# Patient Record
Sex: MALE | Race: WHITE | NOT HISPANIC OR LATINO | URBAN - METROPOLITAN AREA
[De-identification: names, ages, dates, MRNs, and addresses within clinical notes are randomized per-mention and may not be internally consistent; named-entity substitution may affect disease eponyms.]

---

## 2017-05-25 ENCOUNTER — APPOINTMENT (OUTPATIENT)
Age: 82
Setting detail: DERMATOLOGY
End: 2017-05-27

## 2017-05-25 DIAGNOSIS — Z86.006 PERSONAL HISTORY OF MELANOMA IN-SITU: ICD-10-CM

## 2017-05-25 DIAGNOSIS — L57.0 ACTINIC KERATOSIS: ICD-10-CM

## 2017-05-25 DIAGNOSIS — L92.0 GRANULOMA ANNULARE: ICD-10-CM

## 2017-05-25 DIAGNOSIS — Z85.828 PERSONAL HISTORY OF OTHER MALIGNANT NEOPLASM OF SKIN: ICD-10-CM

## 2017-05-25 DIAGNOSIS — Z71.89 OTHER SPECIFIED COUNSELING: ICD-10-CM

## 2017-05-25 PROBLEM — Z85.820 PERSONAL HISTORY OF MALIGNANT MELANOMA OF SKIN: Status: ACTIVE | Noted: 2017-05-25

## 2017-05-25 PROCEDURE — 17003 DESTRUCT PREMALG LES 2-14: CPT

## 2017-05-25 PROCEDURE — OTHER LIQUID NITROGEN: OTHER

## 2017-05-25 PROCEDURE — 99213 OFFICE O/P EST LOW 20 MIN: CPT | Mod: 25

## 2017-05-25 PROCEDURE — OTHER COUNSELING: OTHER

## 2017-05-25 PROCEDURE — 17000 DESTRUCT PREMALG LESION: CPT

## 2017-05-25 ASSESSMENT — LOCATION DETAILED DESCRIPTION DERM
LOCATION DETAILED: RIGHT PROXIMAL DORSAL FOREARM
LOCATION DETAILED: RIGHT MEDIAL UPPER BACK
LOCATION DETAILED: RIGHT CENTRAL MALAR CHEEK
LOCATION DETAILED: LEFT SUPERIOR MEDIAL UPPER BACK
LOCATION DETAILED: RIGHT ANTERIOR PROXIMAL THIGH

## 2017-05-25 ASSESSMENT — LOCATION SIMPLE DESCRIPTION DERM
LOCATION SIMPLE: RIGHT UPPER BACK
LOCATION SIMPLE: LEFT UPPER BACK
LOCATION SIMPLE: RIGHT FOREARM
LOCATION SIMPLE: RIGHT CHEEK
LOCATION SIMPLE: RIGHT THIGH

## 2017-05-25 ASSESSMENT — LOCATION ZONE DERM
LOCATION ZONE: TRUNK
LOCATION ZONE: FACE
LOCATION ZONE: LEG
LOCATION ZONE: ARM

## 2017-05-25 NOTE — PROCEDURE: LIQUID NITROGEN
Total Number Of Aks Treated: 10
Render Post-Care Instructions In Note?: yes
Duration Of Freeze Thaw-Cycle (Seconds): 5
Consent: The patient's consent was obtained including but not limited to risks of crusting, scabbing, blistering, scarring, darker or lighter pigmentary change, recurrence, incomplete removal and infection.
Number Of Freeze-Thaw Cycles: 2 freeze-thaw cycles
Post-Care Instructions: I reviewed with the patient in detail post-care instructions. Patient is to wear sunprotection, and avoid picking at any of the treated lesions. Pt may apply Vaseline to crusted or scabbing areas.
Detail Level: Zone

## 2017-06-22 DIAGNOSIS — E55.9 VITAMIN D DEFICIENCY: ICD-10-CM

## 2017-06-22 DIAGNOSIS — E03.9 HYPOTHYROIDISM, UNSPECIFIED TYPE: ICD-10-CM

## 2017-06-22 DIAGNOSIS — E53.8 VITAMIN B12 DEFICIENCY: ICD-10-CM

## 2017-06-22 DIAGNOSIS — E11.29 TYPE 2 DIABETES MELLITUS WITH OTHER KIDNEY COMPLICATION: ICD-10-CM

## 2017-06-29 DIAGNOSIS — E11.9 TYPE 2 DIABETES MELLITUS WITHOUT COMPLICATION, WITHOUT LONG-TERM CURRENT USE OF INSULIN (HCC): ICD-10-CM

## 2017-07-01 LAB
25(OH)D3+25(OH)D2 SERPL-MCNC: 43 NG/ML (ref 30–100)
ALBUMIN SERPL-MCNC: 4.4 G/DL (ref 3.5–4.7)
ALBUMIN/CREAT UR: 10.2 MG/G CREAT (ref 0–30)
ALBUMIN/GLOB SERPL: 1.9 {RATIO} (ref 1.2–2.2)
ALP SERPL-CCNC: 59 IU/L (ref 39–117)
ALT SERPL-CCNC: 31 IU/L (ref 0–44)
AST SERPL-CCNC: 30 IU/L (ref 0–40)
BILIRUB SERPL-MCNC: 1 MG/DL (ref 0–1.2)
BUN SERPL-MCNC: 24 MG/DL (ref 8–27)
BUN/CREAT SERPL: 14 (ref 10–24)
C PEPTIDE SERPL-MCNC: 4.4 NG/ML (ref 1.1–4.4)
CALCIUM SERPL-MCNC: 9.1 MG/DL (ref 8.6–10.2)
CHLORIDE SERPL-SCNC: 106 MMOL/L (ref 96–106)
CO2 SERPL-SCNC: 20 MMOL/L (ref 18–29)
CREAT SERPL-MCNC: 1.72 MG/DL (ref 0.76–1.27)
CREAT UR-MCNC: 230.7 MG/DL
FRUCTOSAMINE SERPL-SCNC: 312 UMOL/L (ref 0–285)
GLOBULIN SER CALC-MCNC: 2.3 G/DL (ref 1.5–4.5)
GLUCOSE SERPL-MCNC: 161 MG/DL (ref 65–99)
HBA1C MFR BLD: 6.8 % (ref 4.8–5.6)
MICROALBUMIN UR-MCNC: 23.6 UG/ML
POTASSIUM SERPL-SCNC: 5 MMOL/L (ref 3.5–5.2)
PROT SERPL-MCNC: 6.7 G/DL (ref 6–8.5)
SODIUM SERPL-SCNC: 142 MMOL/L (ref 134–144)
T3 SERPL-MCNC: 108 NG/DL (ref 71–180)
T4 FREE SERPL-MCNC: 1.41 NG/DL (ref 0.82–1.77)
TSH SERPL DL<=0.005 MIU/L-ACNC: 2.22 UIU/ML (ref 0.45–4.5)
VIT B12 SERPL-MCNC: 495 PG/ML (ref 211–946)

## 2017-07-10 ENCOUNTER — OFFICE VISIT (OUTPATIENT)
Dept: ENDOCRINOLOGY | Facility: MEDICAL CENTER | Age: 82
End: 2017-07-10
Payer: MEDICARE

## 2017-07-10 VITALS
BODY MASS INDEX: 23.03 KG/M2 | SYSTOLIC BLOOD PRESSURE: 120 MMHG | OXYGEN SATURATION: 97 % | HEIGHT: 72 IN | DIASTOLIC BLOOD PRESSURE: 70 MMHG | WEIGHT: 170 LBS | HEART RATE: 96 BPM

## 2017-07-10 DIAGNOSIS — E03.9 HYPOTHYROIDISM, UNSPECIFIED TYPE: ICD-10-CM

## 2017-07-10 DIAGNOSIS — E11.29 TYPE 2 DIABETES MELLITUS WITH OTHER DIABETIC KIDNEY COMPLICATION, WITHOUT LONG-TERM CURRENT USE OF INSULIN (HCC): ICD-10-CM

## 2017-07-10 PROCEDURE — 99215 OFFICE O/P EST HI 40 MIN: CPT | Performed by: INTERNAL MEDICINE

## 2017-07-10 RX ORDER — LEVOTHYROXINE SODIUM 0.05 MG/1
TABLET ORAL
Qty: 90 TAB | Refills: 0 | Status: SHIPPED | OUTPATIENT
Start: 2017-07-10 | End: 2017-07-10

## 2017-07-10 RX ORDER — ROSUVASTATIN CALCIUM 10 MG/1
TABLET, FILM COATED ORAL
Qty: 90 TAB | Refills: 0 | Status: SHIPPED | OUTPATIENT
Start: 2017-07-10 | End: 2017-09-06 | Stop reason: SDUPTHER

## 2017-07-10 RX ORDER — LEVOTHYROXINE SODIUM 0.1 MG/1
100 TABLET ORAL
Qty: 30 TAB | Refills: 3 | Status: SHIPPED | OUTPATIENT
Start: 2017-07-10 | End: 2017-09-06 | Stop reason: SDUPTHER

## 2017-07-10 NOTE — MR AVS SNAPSHOT
"        Juanito Marquez   7/10/2017 2:30 PM   Office Visit   MRN: [id number]    Department:  End[facility]inology [facility]   Dept Phone:  [phone]    Description:  Male : 1935   Provider:  Darrion Mcbride M.D.           Allergies as of 7/10/2017     No Known Allergies      You were diagnosed with     Type 2 diabetes mellitus with stage 3 chronic kidney disease, without long-term current use of insulin (CMS-HCC)   [2091425]         Vital Signs     Blood Pressure Pulse Height Weight Body Mass Index Oxygen Saturation    120/70 mmHg 96 1.829 m (6' 0.01\") 77.111 kg (170 lb) 23.05 kg/m2 97%    Smoking Status                   Never Smoker            Basic Information     Date Of Birth Sex Race Ethnicity Preferred Language    1935 Male White Non- English      Your appointments     2017 10:50 AM   Established Patient with Darrion Mcbride M.D.   [facility]    [address], [address]  [place] [address] [phone]           You will be receiving a confirmation call a few days before your appointment from our automated call confirmation system.              Problem List              ICD-10-CM Priority Class Noted - Resolved    CAD (coronary artery disease) I25.10   Unknown - Present    Hyperlipidemia E78.5   Unknown - Present    Essential hypertension, benign I10   Unknown - Present    Renal insufficiency N28.9   2011 - Present    Hypothyroidism E03.9   2012 - Present    Back pain (Chronic) M54.9   2009 - Present    S/P CABG (coronary artery bypass graft) Z95.1   2013 - Present    Keratosis, actinic L57.0   6/3/2014 - Present    Abnormal thyroid exam R94.6   2014 - Present    Type 2 diabetes mellitus, controlled (CMS-HCC) E11.9   10/14/2015 - Present      Health Maintenance        Date Due Completion Dates    IMM DTaP/Tdap/Td Vaccine (1 - Tdap) 1954 ---    IMM PNEUMOCOCCAL 65+ (ADULT) LOW/MEDIUM RISK SERIES (1 of 2 - " PCV13) 8/2/2001 ---    DIABETES MONOFILAMENT / LE EXAM 6/30/2015 6/30/2014, 6/3/2014 (Done)    Override on 6/3/2014: Done    RETINAL SCREENING 7/13/2015 7/13/2014 (Prv Comp)    Override on 7/13/2014: Previously completed    FASTING LIPID PROFILE 6/10/2016 6/10/2015, 6/13/2014, 8/1/2013, 7/20/2012, 3/8/2012, 9/30/2011    IMM INFLUENZA (1) 9/1/2017 ---    A1C SCREENING 12/29/2017 6/29/2017, 6/14/2016, 2/11/2016, 6/10/2015, 10/9/2014, 6/13/2014, 7/9/2013, 10/11/2012, 7/20/2012, 6/23/2011, 9/27/2010, 9/27/2010    URINE ACR / MICROALBUMIN 6/29/2018 6/29/2017, 6/10/2015, 8/1/2013, 7/20/2012, 8/2/2011, 6/23/2011, 8/17/2010    SERUM CREATININE 6/29/2018 6/29/2017, 6/10/2015, 6/13/2014, 7/9/2013, 7/20/2012, 8/2/2011            Current Immunizations     SHINGLES VACCINE 10/14/2013      Below and/or attached are the medications your provider expects you to take. Review all of your home medications and newly ordered medications with your provider and/or pharmacist. Follow medication instructions as directed by your provider and/or pharmacist. Please keep your medication list with you and share with your provider. Update the information when medications are discontinued, doses are changed, or new medications (including over-the-counter products) are added; and carry medication information at all times in the event of emergency situations     Allergies:  No Known Allergies          Medications  Valid as of: July 10, 2017 -  2:57 PM    Generic Name Brand Name Tablet Size Instructions for use    Ascorbic Acid (Tab) ascorbic acid 500 MG Take 1,000 mg by mouth every day.        Aspirin (Tab) aspirin 81 MG Take 81 mg by mouth every day.        Cholecalciferol (Tab) vitamin D 2000 UNIT Take 1 Tab by mouth every day.        Coenzyme Q10 (Cap) Coenzyme Q10 10 MG Take  by mouth.          Exenatide (Pen-injector) Exenatide ER 2 MG Inject 2 mg as instructed every 7 days.        Exenatide (Pen-injector) Exenatide ER 2 MG Inject  as  instructed.        Glimepiride (Tab) AMARYL 1 MG 2 mg in the morning and 1 mg before dinner        Insulin Pen Needle (Misc) Insulin Pen Needle 31G X 5 MM 1 Each by Does not apply route every day.        Levothyroxine Sodium (Tab) SYNTHROID 50 MCG TAKE ONE TABLET BY MOUTH ONCE DAILY        Olmesartan Medoxomil (Tab) BENICAR 40 MG Take 40 mg by mouth every day.        Omega-3 Fatty Acids (Cap) Omega-3 Fatty Acids 1200 MG Take 3 Caps by mouth every day.        Rosuvastatin Calcium (Tab) CRESTOR 10 MG TAKE ONE TABLET BY MOUTH ONCE DAILY IN THE EVENING        .                 Medicines prescribed today were sent to:     [facility] [address] [place] [address] (S), [place] - [address]    [facility] [email or web address] ([place]) [place] [address]    Phone: [phone] Fax: [phone]    Open 24 Hours?: No      Medication refill instructions:       If your prescription bottle indicates you have medication refills left, it is not necessary to call your provider’s office. Please contact your pharmacy and they will refill your medication.    If your prescription bottle indicates you do not have any refills left, you may request refills at any time through one of the following ways: The online Opzi system (except Urgent Care), by calling your provider’s office, or by asking your pharmacy to contact your provider’s office with a refill request. Medication refills are processed only during regular business hours and may not be available until the next business day. Your provider may request additional information or to have a follow-up visit with you prior to refilling your medication.   *Please Note: Medication refills are assigned a new Rx number when refilled electronically. Your pharmacy may indicate that no refills were authorized even though a new prescription for the same medication is available at the pharmacy. Please request the medicine by name with the pharmacy before contacting your provider for a refill.           Opzi Access Code:  [email or web address]  Expires: 8/9/2017  2:57 PM    12Society  A secure, online tool to manage your health information     [employer]’s 12Society® is a secure, online tool that connects you to your personalized health information from the privacy of your home -- day or night - making it very easy for you to manage your healthcare. Once the activation process is completed, you can even access your medical information using the 12Society marce, which is available for free in the Apple Marce store or Google Play store.     12Society provides the following levels of access (as shown below):   My Chart Features   [facility] Primary Care Doctor [facility]  Specialists [facility]  Urgent  Care Non-[facility]  Primary Care  Doctor   Email your healthcare team securely and privately 24/7 X X X    Manage appointments: schedule your next appointment; view details of past/upcoming appointments X      Request prescription refills. X      View recent personal medical records, including lab and immunizations X X X X   View health record, including health history, allergies, medications X X X X   Read reports about your outpatient visits, procedures, consult and ER notes X X X X   See your discharge summary, which is a recap of your hospital and/or ER visit that includes your diagnosis, lab results, and care plan. X X       How to register for 12Society:  1. Go to  [email or web address].  2. Click on the Sign Up Now box, which takes you to the New Member Sign Up page. You will need to provide the following information:  a. Enter your 12Society Access Code exactly as it appears at the top of this page. (You will not need to use this code after you’ve completed the sign-up process. If you do not sign up before the expiration date, you must request a new code.)   b. Enter your date of birth.   c. Enter your home email address.   d. Click Submit, and follow the next screen’s instructions.  3. Create a 12Society ID. This will be your 12Society login ID and cannot be  changed, so think of one that is secure and easy to remember.  4. Create a [employer] password. You can change your password at any time.  5. Enter your Password Reset Question and Answer. This can be used at a later time if you forget your password.   6. Enter your e-mail address. This allows you to receive e-mail notifications when new information is available in [employer].  7. Click Sign Up. You can now view your health information.    For assistance activating your [employer] account, call [phone]

## 2017-07-10 NOTE — PROGRESS NOTES
Endocrinology Clinic Progress Note  PCP: Violette QUIROZ M.D.    HPI:  Juanito Marquez is a 81 y.o. old patient who comes in today for routine follow up of Management of Uncontrolled Type 2 Diabetes    HPI:  Juanito Marquez is a 81 y.o. old patient who comes in today for evaluation of above stated problem.    Most Recent HbA1c:   Lab Results   Component Value Date/Time    GLYCOHEMOGLOBIN 6.8* 06/29/2017 09:38 AM        Current Diabetes Regimen:  GLP-1 Agent: Exenatide once weekly (2mg)   Other: glimeperide 2 mg in  Am and 1 mg in pm.    Fasting Blood Glucose: 167-225 mg/dL  Before Dinner: 64, 73, 81, 63, 64, 68, 80  Before Bedtime:  Other times:  Hypoglycemia:  Frequent with hypoglycemia unawareness.    ROS:  Constitutional: No weight loss  Cardiac: No palpitations or racing heart  Resp: No shortness of breath  Neuro: No numbness or tinging in feet  Endo: No heat or cold intolerance, no polyuria or polydipsia  All other systems were reviewed and were negative.    Past Medical History:  Patient Active Problem List    Diagnosis Date Noted   • Type 2 diabetes mellitus, controlled (CMS-[facility]) 10/14/2015   • Abnormal thyroid exam 06/30/2014   • Keratosis, actinic 06/03/2014   • S/P CABG (coronary artery bypass graft) 06/27/2013   • Hypothyroidism 07/19/2012   • Renal insufficiency 08/08/2011   • Hyperlipidemia    • Essential hypertension, benign    • CAD (coronary artery disease)    • Back pain 06/23/2009       Past Surgical History:  Past Surgical History   Procedure Laterality Date   • Colectomy       FOR BENIGN ADENOMA   • Multiple coronary artery bypass     • Other       LOWER BACK SURGERY.   • Cholecystectomy         Allergies:  Review of patient's allergies indicates no known allergies.    Social History:  Social History     Social History   • Marital Status:      Spouse Name: N/A   • Number of Children: N/A   • Years of Education: N/A     Occupational History   • Not on file.     Social History Main  "Topics   • Smoking status: Never Smoker    • Smokeless tobacco: Never Used   • Alcohol Use: 2.4 oz/week     4 Glasses of wine per week   • Drug Use: No   • Sexual Activity: Not Currently     Other Topics Concern   • Not on file     Social History Narrative       Family History:  Family History   Problem Relation Age of Onset   • Cancer Mother      breast   • Heart Disease Mother    • Diabetes Father    • Cancer Father      bladder   • Other Father      ruptured appendix   • Diabetes Paternal Grandfather        Medications:    Current outpatient prescriptions:   •  CRESTOR 10 MG Tab, TAKE ONE TABLET BY MOUTH ONCE DAILY IN THE EVENING, Disp: 90 Tab, Rfl: 0  •  Empagliflozin 10 MG Tab, Take 1 tablet by mouth every morning with breakfast., Disp: 30 Tab, Rfl: 3  •  levothyroxine (SYNTHROID) 100 MCG Tab, Take 1 Tab by mouth Every morning on an empty stomach for 30 days., Disp: 30 Tab, Rfl: 3  •  Exenatide ER (BYDUREON) 2 MG Pen-injector, Inject  as instructed., Disp: , Rfl:   •  olmesartan (BENICAR) 40 MG Tab, Take 40 mg by mouth every day., Disp: , Rfl:   •  Exenatide ER (BYDUREON) 2 MG Pen-injector, Inject 2 mg as instructed every 7 days., Disp: 12 Each, Rfl: 3  •  glimepiride (AMARYL) 1 MG tablet, 2 mg in the morning and 1 mg before dinner, Disp: 270 Tab, Rfl: 3  •  Insulin Pen Needle (B-D UF III MINI PEN NEEDLES) 31G X 5 MM MISC, 1 Each by Does not apply route every day., Disp: 100 Each, Rfl: 9  •  ascorbic acid (ASCORBIC ACID) 500 MG TABS, Take 1,000 mg by mouth every day., Disp: , Rfl:   •  Coenzyme Q10 (CO Q 10) 10 MG CAPS, Take  by mouth.  , Disp: , Rfl:   •  Omega-3 Fatty Acids (FISH OIL) 1200 MG CAPS, Take 3 Caps by mouth every day., Disp: , Rfl:   •  Cholecalciferol (VITAMIN D) 2000 UNIT TABS, Take 1 Tab by mouth every day., Disp: , Rfl:   •  aspirin 81 MG tablet, Take 81 mg by mouth every day., Disp: , Rfl:     Labs:    Physical Examination:  Vital signs: /70 mmHg  Pulse 96  Ht 1.829 m (6' 0.01\")  Wt " 77.111 kg (170 lb)  BMI 23.05 kg/m2  SpO2 97% Body mass index is 23.05 kg/(m^2).  General: No apparent distress, cooperative  Eyes: No scleral icterus or discharge  ENMT: Normal on external inspection of nose, lips, normal thyroid exam  Neck: No abnormal masses on inspection  Resp: Normal effort, clear to auscultation bilaterally   CVS: Regular rate and rhythm, S1 S2 normal, no murmur   Extremities: No edema  Abdomen: abdominal obesity present  Neuro: Alert and oriented  Skin: No rash  Psych: Normal mood and affect, intact memory and able to make informed decisions    Assessment and Plan:    1. Type 2 diabetes mellitus with other diabetic kidney complication, without long-term current use of insulin (CMS-[facility])  Since fasting blood sugars are really pretty high. Advised the patient to stop snacking at 9:00 in the evening. Take 2 mg of glimepiride in the evening only.    Add empagliflozin 10 mg to better control diabetes and to reduce cardiovascular risk. Repeat basic metabolic panel after 2 weeks on empagliflozin. Discussed the side effects and benefits of empagliflozin in detail. Encouraged to stay hydrated at all times. Also discussed 39% reduction in cardiovascular death with empagliflozin in addition to the kidney protection. Patient agrees and understands. A copy of the EMPA-Reg outcome TRIAL demonstrating the cardiovascular risk reduction and kidney protection with empagliflozin was provided to the patient.    2. Hypothyroidism, unspecified type  Increase levothyroxine to 100 µg daily. Repeat T4 & TSH after 6 weeks from today.    Return in about 3 weeks (around 7/31/2017).    Total face to face time spent with patient equals 40 minutes. 22/40 minutes were spent on counseling the patient about the mechanism of action, side effects and benefits of GLP-1 therapy, SGLT-Inhibitor therapy. I also counseled the patient on hypoglycemia recognition and management.  Also discussed 39% reduction in cardiovascular death ,  kidney protection, reduction in uric acid levels, normalization of magnesium levels with empagliflozin with patient in detail.    Thank you for allowing me to participate in the care of this patient.    Darrion Mcbride M.D.  07/10/2017    CC:   Violette QUIROZ M.D.    This note was created using voice recognition software (Dragon). The accuracy of the dictation is limited by the abilities of the software. I have reviewed the note prior to signing, however some errors in grammar and context are still possible. If you have any questions related to this note please do not hesitate to contact our office.

## 2017-07-27 LAB
25(OH)D3+25(OH)D2 SERPL-MCNC: 43.2 NG/ML (ref 30–100)
ALBUMIN SERPL-MCNC: 4.3 G/DL (ref 3.5–4.7)
ALBUMIN/CREAT UR: 6 MG/G CREAT (ref 0–30)
ALBUMIN/GLOB SERPL: 1.7 {RATIO} (ref 1.2–2.2)
ALP SERPL-CCNC: 51 IU/L (ref 39–117)
ALT SERPL-CCNC: 30 IU/L (ref 0–44)
AST SERPL-CCNC: 30 IU/L (ref 0–40)
BILIRUB SERPL-MCNC: 0.9 MG/DL (ref 0–1.2)
BUN SERPL-MCNC: 36 MG/DL (ref 8–27)
BUN/CREAT SERPL: 18 (ref 10–24)
C PEPTIDE SERPL-MCNC: 4.7 NG/ML (ref 1.1–4.4)
CALCIUM SERPL-MCNC: 9.8 MG/DL (ref 8.6–10.2)
CHLORIDE SERPL-SCNC: 104 MMOL/L (ref 96–106)
CO2 SERPL-SCNC: 19 MMOL/L (ref 18–29)
CREAT SERPL-MCNC: 2.01 MG/DL (ref 0.76–1.27)
CREAT UR-MCNC: 150.9 MG/DL
FRUCTOSAMINE SERPL-SCNC: 291 UMOL/L (ref 0–285)
GLOBULIN SER CALC-MCNC: 2.6 G/DL (ref 1.5–4.5)
GLUCOSE SERPL-MCNC: 154 MG/DL (ref 65–99)
MICROALBUMIN UR-MCNC: 9 UG/ML
POTASSIUM SERPL-SCNC: 4.6 MMOL/L (ref 3.5–5.2)
PROT SERPL-MCNC: 6.9 G/DL (ref 6–8.5)
SODIUM SERPL-SCNC: 142 MMOL/L (ref 134–144)
T3 SERPL-MCNC: 112 NG/DL (ref 71–180)
T4 FREE SERPL-MCNC: 2.18 NG/DL (ref 0.82–1.77)
TSH SERPL DL<=0.005 MIU/L-ACNC: 0.13 UIU/ML (ref 0.45–4.5)
VIT B12 SERPL-MCNC: 425 PG/ML (ref 211–946)

## 2017-07-28 ENCOUNTER — OFFICE VISIT (OUTPATIENT)
Dept: ENDOCRINOLOGY | Facility: MEDICAL CENTER | Age: 82
End: 2017-07-28
Payer: MEDICARE

## 2017-07-28 VITALS
DIASTOLIC BLOOD PRESSURE: 60 MMHG | SYSTOLIC BLOOD PRESSURE: 122 MMHG | BODY MASS INDEX: 22.73 KG/M2 | HEART RATE: 110 BPM | OXYGEN SATURATION: 97 % | WEIGHT: 167.8 LBS | HEIGHT: 72 IN

## 2017-07-28 DIAGNOSIS — Z79.899 HIGH RISK MEDICATION USE: ICD-10-CM

## 2017-07-28 DIAGNOSIS — E78.5 DYSLIPIDEMIA: ICD-10-CM

## 2017-07-28 DIAGNOSIS — N40.1 BENIGN PROSTATIC HYPERPLASIA WITH LOWER URINARY TRACT SYMPTOMS, UNSPECIFIED MORPHOLOGY: ICD-10-CM

## 2017-07-28 PROCEDURE — 99214 OFFICE O/P EST MOD 30 MIN: CPT | Performed by: INTERNAL MEDICINE

## 2017-07-28 NOTE — MR AVS SNAPSHOT
Juanito Marquez   2017 10:50 AM   Office Visit   MRN: [id number]    Department:  Endocrinology [facility]   Dept Phone:  [phone]    Description:  Male : 1935   Provider:  Darrion Mcbride M.D.           Allergies as of 2017     No Known Allergies      You were diagnosed with     Dyslipidemia   [429305]       High risk medication use   [905056]       Uncontrolled type 2 diabetes mellitus with diabetic nephropathy, without long-term current use of insulin (CMS-HCC)   [5596687]       Benign prostatic hyperplasia with lower urinary tract symptoms, unspecified morphology   [1573437]         Vital Signs     Blood Pressure Pulse Height Weight Body Mass Index Oxygen Saturation    122/60 mmHg 110 1.829 m (6') 76.114 kg (167 lb 12.8 oz) 22.75 kg/m2 97%    Smoking Status                   Never Smoker            Basic Information     Date Of Birth Sex Race Ethnicity Preferred Language    1935 Male White Non- English      Your appointments     Sep 08, 2017  9:10 AM   Established Patient with Darrion Mcbride M.D.   [facility]    [address], [address]  [facility] [address] [phone]           You will be receiving a confirmation call a few days before your appointment from our automated call confirmation system.              Problem List              ICD-10-CM Priority Class Noted - Resolved    CAD (coronary artery disease) I25.10   Unknown - Present    Hyperlipidemia E78.5   Unknown - Present    Essential hypertension, benign I10   Unknown - Present    Renal insufficiency N28.9   2011 - Present    Hypothyroidism E03.9   2012 - Present    Back pain (Chronic) M54.9   2009 - Present    S/P CABG (coronary artery bypass graft) Z95.1   2013 - Present    Keratosis, actinic L57.0   6/3/2014 - Present    Abnormal thyroid exam R94.6   2014 - Present    Type 2 diabetes mellitus, controlled (CMS-HCC) E11.9   10/14/2015 -  Present      Health Maintenance        Date Due Completion Dates    IMM DTaP/Tdap/Td Vaccine (1 - Tdap) 8/2/1955 ---    IMM PNEUMOCOCCAL 65+ (ADULT) LOW/MEDIUM RISK SERIES (1 of 2 - PCV13) 8/2/2001 ---    DIABETES MONOFILAMENT / LE EXAM 6/30/2015 6/30/2014, 6/3/2014 (Done)    Override on 6/3/2014: Done    RETINAL SCREENING 7/13/2015 7/13/2014 (Prv Comp)    Override on 7/13/2014: Previously completed    FASTING LIPID PROFILE 6/10/2016 6/10/2015, 6/13/2014, 8/1/2013, 7/20/2012, 3/8/2012, 9/30/2011    IMM INFLUENZA (1) 9/1/2017 ---    A1C SCREENING 12/29/2017 6/29/2017, 6/14/2016, 2/11/2016, 6/10/2015, 10/9/2014, 6/13/2014, 7/9/2013, 10/11/2012, 7/20/2012, 6/23/2011, 9/27/2010, 9/27/2010    URINE ACR / MICROALBUMIN 7/25/2018 7/25/2017, 6/29/2017, 6/10/2015, 8/1/2013, 7/20/2012, 8/2/2011, 6/23/2011, 8/17/2010    SERUM CREATININE 7/25/2018 7/25/2017, 6/29/2017, 6/10/2015, 6/13/2014, 7/9/2013, 7/20/2012, 8/2/2011            Current Immunizations     SHINGLES VACCINE 10/14/2013      Below and/or attached are the medications your provider expects you to take. Review all of your home medications and newly ordered medications with your provider and/or pharmacist. Follow medication instructions as directed by your provider and/or pharmacist. Please keep your medication list with you and share with your provider. Update the information when medications are discontinued, doses are changed, or new medications (including over-the-counter products) are added; and carry medication information at all times in the event of emergency situations     Allergies:  No Known Allergies          Medications  Valid as of: July 28, 2017 - 11:40 AM    Generic Name Brand Name Tablet Size Instructions for use    Ascorbic Acid (Tab) ascorbic acid 500 MG Take 1,000 mg by mouth every day.        Aspirin (Tab) aspirin 81 MG Take 81 mg by mouth every day.        Cholecalciferol (Tab) vitamin D 2000 UNIT Take 1 Tab by mouth every day.        Coenzyme Q10  (Cap) Coenzyme Q10 10 MG Take  by mouth.          Empagliflozin (Tab) Empagliflozin 10 MG Take 1 tablet by mouth every morning with breakfast.        Exenatide (Pen-injector) Exenatide ER 2 MG Inject 2 mg as instructed every 7 days.        Exenatide (Pen-injector) Exenatide ER 2 MG Inject  as instructed.        Glimepiride (Tab) AMARYL 1 MG 2 mg in the morning and 1 mg before dinner        Insulin Pen Needle (Misc) Insulin Pen Needle 31G X 5 MM 1 Each by Does not apply route every day.        Levothyroxine Sodium (Tab) SYNTHROID 100 MCG Take 1 Tab by mouth Every morning on an empty stomach for 30 days.        Olmesartan Medoxomil (Tab) BENICAR 40 MG Take 40 mg by mouth every day.        Omega-3 Fatty Acids (Cap) Omega-3 Fatty Acids 1200 MG Take 3 Caps by mouth every day.        Rosuvastatin Calcium (Tab) CRESTOR 10 MG TAKE ONE TABLET BY MOUTH ONCE DAILY IN THE EVENING        .                 Medicines prescribed today were sent to:     [facility] [address] [place] [address] (S), [place] [employer] [facility] [email or web address]    [address] [place] [address] (S) [place] [address]    Phone: [phone] Fax: [phone]    Open 24 Hours?: No      Medication refill instructions:       If your prescription bottle indicates you have medication refills left, it is not necessary to call your provider’s office. Please contact your pharmacy and they will refill your medication.    If your prescription bottle indicates you do not have any refills left, you may request refills at any time through one of the following ways: The online DoctorBase system (except Urgent Care), by calling your provider’s office, or by asking your pharmacy to contact your provider’s office with a refill request. Medication refills are processed only during regular business hours and may not be available until the next business day. Your provider may request additional information or to have a follow-up visit with you prior to refilling your medication.   *Please Note: Medication refills are  assigned a new Rx number when refilled electronically. Your pharmacy may indicate that no refills were authorized even though a new prescription for the same medication is available at the pharmacy. Please request the medicine by name with the pharmacy before contacting your provider for a refill.        Your To Do List     Future Labs/Procedures Complete By Expires    BASIC METABOLIC PANEL  As directed 7/28/2019    LIPID PROFILE  As directed 7/28/2019    PROSTATE SPECIFIC AG DIAGNOSTIC  As directed 7/28/2018         Poacht App Access Code: [email or web address]  Expires: 8/9/2017  2:57 PM    Poacht App  A secure, online tool to manage your health information     [employer]’s Poacht App® is a secure, online tool that connects you to your personalized health information from the privacy of your home -- day or night - making it very easy for you to manage your healthcare. Once the activation process is completed, you can even access your medical information using the Poacht App marce, which is available for free in the Apple Marce store or Google Play store.     Poacht App provides the following levels of access (as shown below):   My Chart Features   [facility]  Specialists [facility]  Urgent  Care Non-[facility]  Primary Care  Doctor   Email your healthcare team securely and privately 24/7 X X X    Manage appointments: schedule your next appointment; view details of past/upcoming appointments X      Request prescription refills. X      View recent personal medical records, including lab and immunizations X X X X   View health record, including health history, allergies, medications X X X X   Read reports about your outpatient visits, procedures, consult and ER notes X X X X   See your discharge summary, which is a recap of your hospital and/or ER visit that includes your diagnosis, lab results, and care plan. X X       How to register for Poacht App:  1. Go to  [email or web address].  2. Click on the Sign Up Now box,  which takes you to the New Member Sign Up page. You will need to provide the following information:  a. Enter your Shattered Reality Interactive Access Code exactly as it appears at the top of this page. (You will not need to use this code after you’ve completed the sign-up process. If you do not sign up before the expiration date, you must request a new code.)   b. Enter your date of birth.   c. Enter your home email address.   d. Click Submit, and follow the next screen’s instructions.  3. Create a Shattered Reality Interactive ID. This will be your S[employer] Reality Interactive login ID and cannot be changed, so think of one that is secure and easy to remember.  4. Create a S[employer] Interactive password. You can change your password at any time.  5. Enter your Password Reset Question and Answer. This can be used at a later time if you forget your password.   6. Enter your e-mail address. This allows you to receive e-mail notifications when new information is available in S[employer]tered Reality Interactive.  7. Click Sign Up. You can now view your health information.    For assistance activating your S[employer] Interactive account, call [phone]

## 2017-07-28 NOTE — PROGRESS NOTES
Endocrinology Clinic Progress Note  PCP: Violette QUIROZ M.D.    HPI:  Juanito Marquez is a 81 y.o. old patient who comes in today for routine follow up of Management of Uncontrolled Type 2 Diabetes    HPI:  Juanito Marquez is a 81 y.o. old patient who comes in today for evaluation of above stated problem.    Most Recent HbA1c:   Lab Results   Component Value Date/Time    GLYCOHEMOGLOBIN 6.8* 06/29/2017 09:38 AM        Current Diabetes Regimen:  GLP-1 Agent: Exenatide once weekly (2mg)   SGLT-2 Inhibitor:  Empagliflozin 10 mg once daily   Other: glimperide 1 mg twice daily.     Fasting Blood Glucose: Less than 130 mg/dL and below (previously were 180 mg/dl and higher)  Before Lunch: < 130  Before Dinner: <130  Before Bedtime:  Other times:  Hypoglycemia:  Occasional    ROS:  Constitutional: No weight loss  Cardiac: No palpitations or racing heart  Resp: No shortness of breath  Neuro: No numbness or tinging in feet  Endo: No heat or cold intolerance, no polyuria or polydipsia  All other systems were reviewed and were negative.    Past Medical History:  Patient Active Problem List    Diagnosis Date Noted   • Type 2 diabetes mellitus, controlled (CMS-[facility]) 10/14/2015   • Abnormal thyroid exam 06/30/2014   • Keratosis, actinic 06/03/2014   • S/P CABG (coronary artery bypass graft) 06/27/2013   • Hypothyroidism 07/19/2012   • Renal insufficiency 08/08/2011   • Hyperlipidemia    • Essential hypertension, benign    • CAD (coronary artery disease)    • Back pain 06/23/2009       Past Surgical History:  Past Surgical History   Procedure Laterality Date   • Colectomy       FOR BENIGN ADENOMA   • Multiple coronary artery bypass     • Other       LOWER BACK SURGERY.   • Cholecystectomy         Allergies:  Review of patient's allergies indicates no known allergies.    Social History:  Social History     Social History   • Marital Status:      Spouse Name: N/A   • Number of Children: N/A   • Years of Education: N/A      Occupational History   • Not on file.     Social History Main Topics   • Smoking status: Never Smoker    • Smokeless tobacco: Never Used   • Alcohol Use: 2.4 oz/week     4 Glasses of wine per week   • Drug Use: No   • Sexual Activity: Not Currently     Other Topics Concern   • Not on file     Social History Narrative       Family History:  Family History   Problem Relation Age of Onset   • Cancer Mother      breast   • Heart Disease Mother    • Diabetes Father    • Cancer Father      bladder   • Other Father      ruptured appendix   • Diabetes Paternal Grandfather        Medications:    Current outpatient prescriptions:   •  CRESTOR 10 MG Tab, TAKE ONE TABLET BY MOUTH ONCE DAILY IN THE EVENING, Disp: 90 Tab, Rfl: 0  •  Empagliflozin 10 MG Tab, Take 1 tablet by mouth every morning with breakfast., Disp: 30 Tab, Rfl: 3  •  levothyroxine (SYNTHROID) 100 MCG Tab, Take 1 Tab by mouth Every morning on an empty stomach for 30 days., Disp: 30 Tab, Rfl: 3  •  Exenatide ER (BYDUREON) 2 MG Pen-injector, Inject  as instructed., Disp: , Rfl:   •  olmesartan (BENICAR) 40 MG Tab, Take 40 mg by mouth every day., Disp: , Rfl:   •  Exenatide ER (BYDUREON) 2 MG Pen-injector, Inject 2 mg as instructed every 7 days., Disp: 12 Each, Rfl: 3  •  glimepiride (AMARYL) 1 MG tablet, 2 mg in the morning and 1 mg before dinner (Patient taking differently: 1 mg 2 times a day. 2 mg in the morning and 1 mg before dinner), Disp: 270 Tab, Rfl: 3  •  Insulin Pen Needle (B-D UF III MINI PEN NEEDLES) 31G X 5 MM MISC, 1 Each by Does not apply route every day., Disp: 100 Each, Rfl: 9  •  ascorbic acid (ASCORBIC ACID) 500 MG TABS, Take 1,000 mg by mouth every day., Disp: , Rfl:   •  Coenzyme Q10 (CO Q 10) 10 MG CAPS, Take  by mouth.  , Disp: , Rfl:   •  Omega-3 Fatty Acids (FISH OIL) 1200 MG CAPS, Take 3 Caps by mouth every day., Disp: , Rfl:   •  Cholecalciferol (VITAMIN D) 2000 UNIT TABS, Take 1 Tab by mouth every day., Disp: , Rfl:   •  aspirin 81  MG tablet, Take 81 mg by mouth every day., Disp: , Rfl:     Labs:    Physical Examination:  Vital signs: /60 mmHg  Pulse 110  Ht 1.829 m (6')  Wt 76.114 kg (167 lb 12.8 oz)  BMI 22.75 kg/m2  SpO2 97% Body mass index is 22.75 kg/(m^2).  General: No apparent distress, cooperative  Eyes: No scleral icterus or discharge  ENMT: Normal on external inspection of nose, lips, normal thyroid exam  Neck: No abnormal masses on inspection  Resp: Normal effort, clear to auscultation bilaterally   CVS: Regular rate and rhythm, S1 S2 normal, no murmur   Extremities: No edema  Abdomen: abdominal obesity present  Neuro: Alert and oriented  Skin: No rash  Psych: Normal mood and affect, intact memory and able to make informed decisions    Assessment and Plan:    1. Dyslipidemia  He is due for his lipid profile this year and will obtain it.    2. High risk medication use  Continue to monitor his renal function.    3. Uncontrolled type 2 diabetes mellitus with diabetic nephropathy, without long-term current use of insulin (CMS-HCC)  Sugars are much better controlled compared to the previous visit. Fasting blood glucose previously which were 180 and higher are now 130 and below.    4. Benign prostatic hyperplasia with lower urinary tract symptoms, unspecified morphology  Along with his labs will add the PSA as per the patient request.    5. Hypothyroidism: Controlled    Return in about 6 weeks (around 9/8/2017).    Thank you for allowing me to participate in the care of this patient.    Darrion Mcbride M.D.  07/28/2017    CC:   Violette QUIROZ M.D.    This note was created using voice recognition software (Dragon). The accuracy of the dictation is limited by the abilities of the software. I have reviewed the note prior to signing, however some errors in grammar and context are still possible. If you have any questions related to this note please do not hesitate to contact our office.

## 2017-07-28 NOTE — PROGRESS NOTES
Patient with existing type diabetes:  Type 2 Diabetes for 20 years     Patient's health status since last visit: General Health good.  Issues with diabetes since last visit Fasting blood sugars getting better  Current Diabetes Medications: Glimeperide 1 mg BID and Jardiance 10 mg    HbA1c:   Lab Results   Component Value Date/Time    GLYCOHEMOGLOBIN 6.8* 06/29/2017 09:38 AM        FSBS  Testing: Testing fasting and in the evening.    Hypoglycemia: Vision changes with low blood sugars.  He feels he is having less low blood sugars.    Exercise: Walking or playing golf daily    Retinal Exam:Yearly and states stable.    Daily Foot Exam: Checking daily    Routine Dental Exams: routine  Flu vaccine: up to date  Pneumonia vaccine up to date    Education provided by RN, CDE: Doing better after starting the Jardiance 10 mg daily

## 2017-07-29 LAB
GAD65 AB SER IA-ACNC: <5 U/ML (ref 0–5)
ISLET CELL512 AB SER-ACNC: <1 U/ML

## 2017-09-02 LAB
BUN SERPL-MCNC: 34 MG/DL (ref 8–27)
BUN/CREAT SERPL: 17 (ref 10–24)
CALCIUM SERPL-MCNC: 8.8 MG/DL (ref 8.6–10.2)
CHLORIDE SERPL-SCNC: 106 MMOL/L (ref 96–106)
CHOLEST SERPL-MCNC: 111 MG/DL (ref 100–199)
CO2 SERPL-SCNC: 19 MMOL/L (ref 18–29)
COMMENT 011824: NORMAL
CREAT SERPL-MCNC: 2 MG/DL (ref 0.76–1.27)
GLUCOSE SERPL-MCNC: 135 MG/DL (ref 65–99)
HDLC SERPL-MCNC: 62 MG/DL
LDLC SERPL CALC-MCNC: 36 MG/DL (ref 0–99)
POTASSIUM SERPL-SCNC: 4.9 MMOL/L (ref 3.5–5.2)
PSA SERPL-MCNC: 0.6 NG/ML (ref 0–4)
SODIUM SERPL-SCNC: 143 MMOL/L (ref 134–144)
TRIGL SERPL-MCNC: 67 MG/DL (ref 0–149)
VLDLC SERPL CALC-MCNC: 13 MG/DL (ref 5–40)

## 2017-09-06 ENCOUNTER — OFFICE VISIT (OUTPATIENT)
Dept: ENDOCRINOLOGY | Facility: MEDICAL CENTER | Age: 82
End: 2017-09-06
Payer: MEDICARE

## 2017-09-06 VITALS
HEART RATE: 100 BPM | DIASTOLIC BLOOD PRESSURE: 74 MMHG | HEIGHT: 72 IN | OXYGEN SATURATION: 96 % | WEIGHT: 160 LBS | SYSTOLIC BLOOD PRESSURE: 120 MMHG | BODY MASS INDEX: 21.67 KG/M2

## 2017-09-06 DIAGNOSIS — E11.29 TYPE 2 DIABETES MELLITUS WITH OTHER DIABETIC KIDNEY COMPLICATION, WITHOUT LONG-TERM CURRENT USE OF INSULIN (HCC): ICD-10-CM

## 2017-09-06 DIAGNOSIS — E78.2 MIXED HYPERLIPIDEMIA: ICD-10-CM

## 2017-09-06 DIAGNOSIS — E03.9 HYPOTHYROIDISM, UNSPECIFIED TYPE: ICD-10-CM

## 2017-09-06 DIAGNOSIS — I10 ESSENTIAL HYPERTENSION, BENIGN: ICD-10-CM

## 2017-09-06 PROCEDURE — 99214 OFFICE O/P EST MOD 30 MIN: CPT | Performed by: INTERNAL MEDICINE

## 2017-09-06 RX ORDER — GLIMEPIRIDE 1 MG/1
TABLET ORAL
Qty: 270 TAB | Refills: 3 | Status: SHIPPED | OUTPATIENT
Start: 2017-09-06 | End: 2018-09-21 | Stop reason: SDUPTHER

## 2017-09-06 RX ORDER — ROSUVASTATIN CALCIUM 10 MG/1
10 TABLET, COATED ORAL EVERY EVENING
Qty: 90 TAB | Refills: 3 | Status: SHIPPED | OUTPATIENT
Start: 2017-09-06 | End: 2021-08-23 | Stop reason: SDUPTHER

## 2017-09-06 RX ORDER — OLMESARTAN MEDOXOMIL 40 MG/1
40 TABLET ORAL DAILY
Qty: 90 TAB | Refills: 3 | Status: ON HOLD | OUTPATIENT
Start: 2017-09-06 | End: 2019-08-15

## 2017-09-06 RX ORDER — LEVOTHYROXINE SODIUM 0.1 MG/1
100 TABLET ORAL
Qty: 90 TAB | Refills: 3 | Status: SHIPPED | OUTPATIENT
Start: 2017-09-06 | End: 2018-09-21 | Stop reason: SDUPTHER

## 2017-09-06 NOTE — PROGRESS NOTES
Endocrinology Clinic Progress Note  PCP: Violette QUIROZ M.D.    HPI:  Juanito Marquez is a 81 y.o. old patient who comes in today for routine follow up of Management of Uncontrolled Type 2 Diabetes    HPI:  Juanito Marquez is a 81 y.o. old patient who comes in today for evaluation of above stated problem.    Most Recent HbA1c:   Lab Results   Component Value Date/Time    HBA1C 6.8 (H) 06/29/2017 09:38 AM    HBA1C 7.6 06/14/2016 11:52 AM        Current Diabetes Regimen:  GLP-1 Agent: Exenatide once weekly (2mg)   SGLT-2 Inhibitor:  Empagliflozin 10 mg once daily   Other: glimperide 1 mg twice daily.     Fasting Blood Glucose: Less than 130 mg/dL and below (previously were 180 mg/dl and higher)( ocassional reading of 150, 193 when he eats outside the previous evening).  Before Lunch: < 130  Before Dinner: <130  Hypoglycemia:  None    He feels well overall with good energy levels. He states that he is now at his high school weight.    ROS:  Constitutional:  weight loss Of 10 pounds since 10th July 2017  Cardiac: No palpitations or racing heart  Resp: No shortness of breath  Neuro: No numbness or tinging in feet  Endo: No heat or cold intolerance, no polyuria or polydipsia  All other systems were reviewed and were negative.    Past Medical History:  Patient Active Problem List    Diagnosis Date Noted   • Type 2 diabetes mellitus, controlled (CMS-[facility]) 10/14/2015   • Abnormal thyroid exam 06/30/2014   • Keratosis, actinic 06/03/2014   • S/P CABG (coronary artery bypass graft) 06/27/2013   • Hypothyroidism 07/19/2012   • Renal insufficiency 08/08/2011   • Hyperlipidemia    • Essential hypertension, benign    • CAD (coronary artery disease)    • Back pain 06/23/2009       Past Surgical History:  Past Surgical History:   Procedure Laterality Date   • CHOLECYSTECTOMY     • COLECTOMY      FOR BENIGN ADENOMA   • MULTIPLE CORONARY ARTERY BYPASS     • OTHER      LOWER BACK SURGERY.       Allergies:  Review of patient's  allergies indicates no known allergies.    Social History:  Social History     Social History   • Marital status:      Spouse name: N/A   • Number of children: N/A   • Years of education: N/A     Occupational History   • Not on file.     Social History Main Topics   • Smoking status: Never Smoker   • Smokeless tobacco: Never Used   • Alcohol use 2.4 oz/week     4 Glasses of wine per week   • Drug use: No   • Sexual activity: Not Currently     Other Topics Concern   • Not on file     Social History Narrative   • No narrative on file       Family History:  Family History   Problem Relation Age of Onset   • Cancer Mother      breast   • Heart Disease Mother    • Diabetes Father    • Cancer Father      bladder   • Other Father      ruptured appendix   • Diabetes Paternal Grandfather        Medications:    Current Outpatient Prescriptions:   •  Empagliflozin 10 MG Tab, Take 1 tablet by mouth every morning with breakfast., Disp: 90 Tab, Rfl: 3  •  Exenatide ER (BYDUREON) 2 MG Pen-injector, Inject 2 mg as instructed every 7 days., Disp: 12 Each, Rfl: 3  •  glimepiride (AMARYL) 1 MG tablet, 2 mg in the morning and 1 mg before dinner, Disp: 270 Tab, Rfl: 3  •  levothyroxine (SYNTHROID) 100 MCG Tab, Take 1 Tab by mouth Every morning on an empty stomach., Disp: 90 Tab, Rfl: 3  •  rosuvastatin (CRESTOR) 10 MG Tab, Take 1 Tab by mouth every evening., Disp: 90 Tab, Rfl: 3  •  olmesartan (BENICAR) 40 MG Tab, Take 1 Tab by mouth every day., Disp: 90 Tab, Rfl: 3  •  Insulin Pen Needle (B-D UF III MINI PEN NEEDLES) 31G X 5 MM MISC, 1 Each by Does not apply route every day., Disp: 100 Each, Rfl: 9  •  ascorbic acid (ASCORBIC ACID) 500 MG TABS, Take 1,000 mg by mouth every day., Disp: , Rfl:   •  Coenzyme Q10 (CO Q 10) 10 MG CAPS, Take  by mouth.  , Disp: , Rfl:   •  Omega-3 Fatty Acids (FISH OIL) 1200 MG CAPS, Take 3 Caps by mouth every day., Disp: , Rfl:   •  Cholecalciferol (VITAMIN D) 2000 UNIT TABS, Take 1 Tab by mouth  every day., Disp: , Rfl:   •  aspirin 81 MG tablet, Take 81 mg by mouth every day., Disp: , Rfl:     Labs:Reviewed    Physical Examination:  Vital signs: /74   Pulse 100   Ht 1.829 m (6')   Wt 72.6 kg (160 lb)   SpO2 96%   BMI 21.70 kg/m²  Body mass index is 21.7 kg/m².  General: No apparent distress, cooperative  Eyes: No scleral icterus or discharge  ENMT: Normal on external inspection of nose, lips, normal thyroid exam  Neck: No abnormal masses on inspection  Resp: Normal effort, clear to auscultation bilaterally   CVS: Regular rate and rhythm, S1 S2 normal, no murmur   Extremities: No edema  Abdomen: abdominal obesity present  Neuro: Alert and oriented  Skin: No rash  Psych: Normal mood and affect, intact memory and able to make informed decisions    Assessment and Plan:    1. Dyslipidemia  Controlled. Continue Crestor.    2. High risk medication use  Continue to monitor his renal function.    3. Uncontrolled type 2 diabetes mellitus with diabetic nephropathy, without long-term current use of insulin (CMS-HCC)  Sugars are much better controlled compared to the previous visit. Fasting blood glucose previously which were 180 and higher are now 130 and below.    4. Hypothyroidism: Controlled;  Continue 200 µg of levothyroxine daily.    Return in about 6 months (around 3/6/2018).    Thank you for allowing me to participate in the care of this patient.    Darrion Mcbride M.D.  07/28/2017    CC:   Violette QUIROZ M.D.    This note was created using voice recognition software (Dragon). The accuracy of the dictation is limited by the abilities of the software. I have reviewed the note prior to signing, however some errors in grammar and context are still possible. If you have any questions related to this note please do not hesitate to contact our office.

## 2017-10-13 ENCOUNTER — OFFICE VISIT (OUTPATIENT)
Dept: CARDIOLOGY | Facility: MEDICAL CENTER | Age: 82
End: 2017-10-13
Payer: MEDICARE

## 2017-10-13 VITALS
HEIGHT: 72 IN | SYSTOLIC BLOOD PRESSURE: 110 MMHG | WEIGHT: 159 LBS | OXYGEN SATURATION: 92 % | HEART RATE: 112 BPM | BODY MASS INDEX: 21.54 KG/M2 | DIASTOLIC BLOOD PRESSURE: 60 MMHG

## 2017-10-13 DIAGNOSIS — E78.5 DYSLIPIDEMIA: ICD-10-CM

## 2017-10-13 DIAGNOSIS — Z95.1 S/P CABG (CORONARY ARTERY BYPASS GRAFT): ICD-10-CM

## 2017-10-13 DIAGNOSIS — I10 ESSENTIAL HYPERTENSION, BENIGN: ICD-10-CM

## 2017-10-13 DIAGNOSIS — I25.10 CORONARY ARTERY DISEASE DUE TO LIPID RICH PLAQUE: ICD-10-CM

## 2017-10-13 DIAGNOSIS — I25.83 CORONARY ARTERY DISEASE DUE TO LIPID RICH PLAQUE: ICD-10-CM

## 2017-10-13 PROCEDURE — 99214 OFFICE O/P EST MOD 30 MIN: CPT | Performed by: INTERNAL MEDICINE

## 2017-10-13 RX ORDER — NIACINAMIDE 500 MG
500 TABLET ORAL 2 TIMES DAILY
COMMUNITY

## 2017-10-13 NOTE — LETTER
[facility]   [address], [facility] [address]  [place] [address]  Phone: [phone]  Fax: [phone]              Juanito Marquez  1935    Encounter Date: 10/13/2017    Ulices Mcintyre M.D.          PROGRESS NOTE:  Subjective:   Juanito Marquez is a 82 y.o. male who presents today For follow-up of his history of coronary disease with mildly positive stress test chronically but no angina    He has been doing well tolerating his medications well he remains very active spending time between [place] and [place] over the summer    Past Medical History:   Diagnosis Date   • Back pain 6/23/2009   • CAD (coronary artery disease)    • Chronic diarrhea 7/21/2008   • Colon polyp 2007   • DM (diabetes mellitus) (CMS-[facility])    • Hyperlipidemia    • Hypertension    • Keratosis, actinic 6/3/2014   • lumbar laminectomy 2010   • Overweight(278.02) 7/21/2008   • S/P right colectomy 2004    benign tubulovillous adenoma   • Sleep apnea    • status post CABG 1994   • Status post cholecystectomy 2004     Past Surgical History:   Procedure Laterality Date   • CHOLECYSTECTOMY     • COLECTOMY      FOR BENIGN ADENOMA   • MULTIPLE CORONARY ARTERY BYPASS     • OTHER      LOWER BACK SURGERY.     Family History   Problem Relation Age of Onset   • Cancer Mother      breast   • Heart Disease Mother    • Diabetes Father    • Cancer Father      bladder   • Other Father      ruptured appendix   • Diabetes Paternal Grandfather      History   Smoking Status   • Never Smoker   Smokeless Tobacco   • Never Used     No Known Allergies  Outpatient Encounter Prescriptions as of 10/13/2017   Medication Sig Dispense Refill   • NIACINAMIDE PO Take 50 mg by mouth 2 Times a Day.     • Empagliflozin 10 MG Tab Take 1 tablet by mouth every morning with breakfast. 90 Tab 3   • Exenatide ER (BYDUREON) 2 MG Pen-injector Inject 2 mg as instructed every 7 days. 12 Each 3   • glimepiride (AMARYL) 1 MG tablet  2 mg in the morning and 1 mg before dinner 270 Tab 3   • levothyroxine (SYNTHROID) 100 MCG Tab Take 1 Tab by mouth Every morning on an empty stomach. 90 Tab 3   • rosuvastatin (CRESTOR) 10 MG Tab Take 1 Tab by mouth every evening. 90 Tab 3   • olmesartan (BENICAR) 40 MG Tab Take 1 Tab by mouth every day. (Patient taking differently: Take 20 mg by mouth every day.) 90 Tab 3   • ascorbic acid (ASCORBIC ACID) 500 MG TABS Take 1,000 mg by mouth every day.     • Coenzyme Q10 (CO Q 10) 10 MG CAPS Take  by mouth.       • Omega-3 Fatty Acids (FISH OIL) 1200 MG CAPS Take 3 Caps by mouth every day.     • Cholecalciferol (VITAMIN D) 2000 UNIT TABS Take 1 Tab by mouth every day.     • aspirin 81 MG tablet Take 81 mg by mouth every day.     • Insulin Pen Needle (B-D UF III MINI PEN NEEDLES) 31G X 5 MM MISC 1 Each by Does not apply route every day. 100 Each 9     No facility-administered encounter medications on file as of 10/13/2017.      Review of Systems   Constitutional: Negative for chills and fever.   HENT: Negative for sore throat.    Eyes: Negative for blurred vision.   Respiratory: Negative for cough and shortness of breath.    Cardiovascular: Negative for chest pain, palpitations, claudication, leg swelling and PND.   Gastrointestinal: Negative for abdominal pain and nausea.   Musculoskeletal: Negative for falls and joint pain.   Skin: Negative for rash.   Neurological: Negative for dizziness, focal weakness and weakness.   Endo/Heme/Allergies: Does not bruise/bleed easily.        Objective:   /60   Pulse (!) 112   Ht 1.829 m (6')   Wt 72.1 kg (159 lb)   SpO2 92%   BMI 21.56 kg/m²      Physical Exam   Constitutional: No distress.   HENT:   Mouth/Throat: Oropharynx is clear and moist.   Eyes: No scleral icterus.   Neck: Neck supple. No JVD present.   Cardiovascular: Normal rate, regular rhythm, normal heart sounds and intact distal pulses.  Exam reveals no gallop and no friction rub.    No murmur  heard.  Pulmonary/Chest: Effort normal. He has no rales.   Abdominal: Soft. Bowel sounds are normal. There is no tenderness.   Musculoskeletal: He exhibits no edema.   Neurological: He is alert.   Skin: No rash noted. He is not diaphoretic.   Psychiatric: He has a normal mood and affect.     Labs reviewed stable chronic kidney disease with mild progression GFR 30    LDL quite low at 36    Assessment:     1. Coronary artery disease due to lipid rich plaque     2. Dyslipidemia     3. Essential hypertension, benign     4. S/P CABG (coronary artery bypass graft)         Medical Decision Making:  Today's Assessment / Status / Plan:     It was my pleasure to meet with Mr. Marquez.    He is on appropriate medicine for his coronary disease we discussed as before he has a remote history of positive stress test after CABG but very minimal and no clinical angina he will continue to monitor    I will see Mr. Marquez back in 1 year time and encouraged him to follow up with us over the phone or e-mail using my MyChart as issues arise.    It is my pleasure to participate in the care of Mr. Marquez.  Please do not hesitate to contact me with questions or concerns.    Ulices Mcintyre MD PhD FACC  Cardiologist [facility]        Violette Huntley M.D.  [id number]   [address]  [place] [address]  VIA Facsimile: [phone]

## 2017-10-22 ASSESSMENT — ENCOUNTER SYMPTOMS
PND: 0
FOCAL WEAKNESS: 0
BLURRED VISION: 0
BRUISES/BLEEDS EASILY: 0
FALLS: 0
SHORTNESS OF BREATH: 0
ABDOMINAL PAIN: 0
WEAKNESS: 0
SORE THROAT: 0
COUGH: 0
CLAUDICATION: 0
NAUSEA: 0
FEVER: 0
DIZZINESS: 0
PALPITATIONS: 0
CHILLS: 0

## 2017-10-23 NOTE — PROGRESS NOTES
Subjective:   Juanito Marquez is a 82 y.o. male who presents today For follow-up of his history of coronary disease with mildly positive stress test chronically but no angina    He has been doing well tolerating his medications well he remains very active spending time between [place] and [place] over the summer    Past Medical History:   Diagnosis Date   • Back pain 6/23/2009   • CAD (coronary artery disease)    • Chronic diarrhea 7/21/2008   • Colon polyp 2007   • DM (diabetes mellitus) (CMS-HCC)    • Hyperlipidemia    • Hypertension    • Keratosis, actinic 6/3/2014   • lumbar laminectomy 2010   • Overweight(278.02) 7/21/2008   • S/P right colectomy 2004    benign tubulovillous adenoma   • Sleep apnea    • status post CABG 1994   • Status post cholecystectomy 2004     Past Surgical History:   Procedure Laterality Date   • CHOLECYSTECTOMY     • COLECTOMY      FOR BENIGN ADENOMA   • MULTIPLE CORONARY ARTERY BYPASS     • OTHER      LOWER BACK SURGERY.     Family History   Problem Relation Age of Onset   • Cancer Mother      breast   • Heart Disease Mother    • Diabetes Father    • Cancer Father      bladder   • Other Father      ruptured appendix   • Diabetes Paternal Grandfather      History   Smoking Status   • Never Smoker   Smokeless Tobacco   • Never Used     No Known Allergies  Outpatient Encounter Prescriptions as of 10/13/2017   Medication Sig Dispense Refill   • NIACINAMIDE PO Take 50 mg by mouth 2 Times a Day.     • Empagliflozin 10 MG Tab Take 1 tablet by mouth every morning with breakfast. 90 Tab 3   • Exenatide ER (BYDUREON) 2 MG Pen-injector Inject 2 mg as instructed every 7 days. 12 Each 3   • glimepiride (AMARYL) 1 MG tablet 2 mg in the morning and 1 mg before dinner 270 Tab 3   • levothyroxine (SYNTHROID) 100 MCG Tab Take 1 Tab by mouth Every morning on an empty stomach. 90 Tab 3   • rosuvastatin (CRESTOR) 10 MG Tab Take 1 Tab by mouth every evening. 90 Tab 3   • olmesartan (BENICAR) 40 MG  Tab Take 1 Tab by mouth every day. (Patient taking differently: Take 20 mg by mouth every day.) 90 Tab 3   • ascorbic acid (ASCORBIC ACID) 500 MG TABS Take 1,000 mg by mouth every day.     • Coenzyme Q10 (CO Q 10) 10 MG CAPS Take  by mouth.       • Omega-3 Fatty Acids (FISH OIL) 1200 MG CAPS Take 3 Caps by mouth every day.     • Cholecalciferol (VITAMIN D) 2000 UNIT TABS Take 1 Tab by mouth every day.     • aspirin 81 MG tablet Take 81 mg by mouth every day.     • Insulin Pen Needle (B-D UF III MINI PEN NEEDLES) 31G X 5 MM MISC 1 Each by Does not apply route every day. 100 Each 9     No facility-administered encounter medications on file as of 10/13/2017.      Review of Systems   Constitutional: Negative for chills and fever.   HENT: Negative for sore throat.    Eyes: Negative for blurred vision.   Respiratory: Negative for cough and shortness of breath.    Cardiovascular: Negative for chest pain, palpitations, claudication, leg swelling and PND.   Gastrointestinal: Negative for abdominal pain and nausea.   Musculoskeletal: Negative for falls and joint pain.   Skin: Negative for rash.   Neurological: Negative for dizziness, focal weakness and weakness.   Endo/Heme/Allergies: Does not bruise/bleed easily.        Objective:   /60   Pulse (!) 112   Ht 1.829 m (6')   Wt 72.1 kg (159 lb)   SpO2 92%   BMI 21.56 kg/m²     Physical Exam   Constitutional: No distress.   HENT:   Mouth/Throat: Oropharynx is clear and moist.   Eyes: No scleral icterus.   Neck: Neck supple. No JVD present.   Cardiovascular: Normal rate, regular rhythm, normal heart sounds and intact distal pulses.  Exam reveals no gallop and no friction rub.    No murmur heard.  Pulmonary/Chest: Effort normal. He has no rales.   Abdominal: Soft. Bowel sounds are normal. There is no tenderness.   Musculoskeletal: He exhibits no edema.   Neurological: He is alert.   Skin: No rash noted. He is not diaphoretic.   Psychiatric: He has a normal mood and  affect.     Labs reviewed stable chronic kidney disease with mild progression GFR 30    LDL quite low at 36    Assessment:     1. Coronary artery disease due to lipid rich plaque     2. Dyslipidemia     3. Essential hypertension, benign     4. S/P CABG (coronary artery bypass graft)         Medical Decision Making:  Today's Assessment / Status / Plan:     It was my pleasure to meet with Mr. Marquez.    He is on appropriate medicine for his coronary disease we discussed as before he has a remote history of positive stress test after CABG but very minimal and no clinical angina he will continue to monitor    I will see Mr. Marquez back in 1 year time and encouraged him to follow up with us over the phone or e-mail using my MyChart as issues arise.    It is my pleasure to participate in the care of Mr. Marquez.  Please do not hesitate to contact me with questions or concerns.    Ulices Mcintyre MD PhD FACC  Cardiologist [facility]

## 2017-11-02 ENCOUNTER — APPOINTMENT (OUTPATIENT)
Age: 82
Setting detail: DERMATOLOGY
End: 2017-11-03

## 2017-11-02 DIAGNOSIS — L57.8 OTHER SKIN CHANGES DUE TO CHRONIC EXPOSURE TO NONIONIZING RADIATION: ICD-10-CM

## 2017-11-02 DIAGNOSIS — Z85.828 PERSONAL HISTORY OF OTHER MALIGNANT NEOPLASM OF SKIN: ICD-10-CM

## 2017-11-02 DIAGNOSIS — Z86.007 PERSONAL HISTORY OF IN-SITU NEOPLASM OF SKIN: ICD-10-CM

## 2017-11-02 DIAGNOSIS — Z71.89 OTHER SPECIFIED COUNSELING: ICD-10-CM

## 2017-11-02 DIAGNOSIS — Z86.006 PERSONAL HISTORY OF MELANOMA IN-SITU: ICD-10-CM

## 2017-11-02 DIAGNOSIS — L57.0 ACTINIC KERATOSIS: ICD-10-CM

## 2017-11-02 DIAGNOSIS — L11.1 TRANSIENT ACANTHOLYTIC DERMATOSIS [GROVER]: ICD-10-CM

## 2017-11-02 DIAGNOSIS — D485 NEOPLASM OF UNCERTAIN BEHAVIOR OF SKIN: ICD-10-CM

## 2017-11-02 PROBLEM — D48.5 NEOPLASM OF UNCERTAIN BEHAVIOR OF SKIN: Status: ACTIVE | Noted: 2017-11-02

## 2017-11-02 PROBLEM — Z85.820 PERSONAL HISTORY OF MALIGNANT MELANOMA OF SKIN: Status: ACTIVE | Noted: 2017-11-02

## 2017-11-02 PROCEDURE — 99213 OFFICE O/P EST LOW 20 MIN: CPT | Mod: 25

## 2017-11-02 PROCEDURE — 11100: CPT | Mod: 59

## 2017-11-02 PROCEDURE — 17000 DESTRUCT PREMALG LESION: CPT

## 2017-11-02 PROCEDURE — OTHER LIQUID NITROGEN: OTHER

## 2017-11-02 PROCEDURE — OTHER COUNSELING: OTHER

## 2017-11-02 PROCEDURE — OTHER BIOPSY BY SHAVE METHOD: OTHER

## 2017-11-02 PROCEDURE — 17003 DESTRUCT PREMALG LES 2-14: CPT

## 2017-11-02 PROCEDURE — OTHER DEFER: OTHER

## 2017-11-02 ASSESSMENT — LOCATION SIMPLE DESCRIPTION DERM
LOCATION SIMPLE: SCALP
LOCATION SIMPLE: RIGHT FOREHEAD
LOCATION SIMPLE: RIGHT PRETIBIAL REGION
LOCATION SIMPLE: RIGHT LOWER BACK
LOCATION SIMPLE: RIGHT UPPER BACK
LOCATION SIMPLE: LEFT FOREHEAD
LOCATION SIMPLE: LEFT HAND
LOCATION SIMPLE: LEFT CHEEK
LOCATION SIMPLE: LEFT UPPER BACK
LOCATION SIMPLE: RIGHT FOREARM
LOCATION SIMPLE: RIGHT CHEEK

## 2017-11-02 ASSESSMENT — LOCATION DETAILED DESCRIPTION DERM
LOCATION DETAILED: RIGHT PROXIMAL DORSAL FOREARM
LOCATION DETAILED: RIGHT CENTRAL MALAR CHEEK
LOCATION DETAILED: RIGHT INFERIOR MEDIAL MIDBACK
LOCATION DETAILED: RIGHT CENTRAL POSTAURICULAR SKIN
LOCATION DETAILED: RIGHT MEDIAL PROXIMAL PRETIBIAL REGION
LOCATION DETAILED: RIGHT SUPERIOR LATERAL LOWER BACK
LOCATION DETAILED: LEFT LATERAL FOREHEAD
LOCATION DETAILED: LEFT LATERAL MALAR CHEEK
LOCATION DETAILED: LEFT RADIAL DORSAL HAND
LOCATION DETAILED: LEFT MEDIAL UPPER BACK
LOCATION DETAILED: RIGHT MID-UPPER BACK
LOCATION DETAILED: RIGHT MEDIAL UPPER BACK
LOCATION DETAILED: RIGHT INFERIOR LATERAL FOREHEAD

## 2017-11-02 ASSESSMENT — LOCATION ZONE DERM
LOCATION ZONE: FACE
LOCATION ZONE: SCALP
LOCATION ZONE: LEG
LOCATION ZONE: ARM
LOCATION ZONE: HAND
LOCATION ZONE: TRUNK

## 2017-11-02 NOTE — PROCEDURE: LIQUID NITROGEN
Consent: The patient's consent was obtained including but not limited to risks of crusting, scabbing, blistering, scarring, darker or lighter pigmentary change, recurrence, incomplete removal and infection.
Detail Level: Zone
Duration Of Freeze Thaw-Cycle (Seconds): 2
Total Number Of Aks Treated: 8
Post-Care Instructions: I reviewed with the patient in detail post-care instructions. Patient is to wear sunprotection, and avoid picking at any of the treated lesions. Pt may apply Vaseline to crusted or scabbing areas.
Number Of Freeze-Thaw Cycles: 2 freeze-thaw cycles
Render Post-Care Instructions In Note?: yes

## 2017-11-02 NOTE — PROCEDURE: BIOPSY BY SHAVE METHOD
Biopsy Method: Double edge Personna blades
Anesthesia Type: 1% lidocaine without epinephrine
Billing Type: Third-Party Bill
Anesthesia Volume In Cc (Will Not Render If 0): 0.5
Electrodesiccation And Curettage Text: The wound bed was treated with electrodesiccation and curettage after the biopsy was performed.
Dressing: bandage
Hemostasis: Electrocautery
Type Of Destruction Used: Electrodesiccation and Curettage
Render Post-Care Instructions In Note?: yes
X Size Of Lesion In Cm: 0
Destruction After The Procedure: No
Detail Level: Detailed
Notification Instructions: Patient will be notified of biopsy results. However, patient instructed to call the office if not contacted within 2 weeks.
Biopsy Type: H and E
Wound Care: Vaseline
Cryotherapy Text: The wound bed was treated with cryotherapy after the biopsy was performed.
Electrodesiccation Text: The wound bed was treated with electrodesiccation after the biopsy was performed.
Silver Nitrate Text: The wound bed was treated with silver nitrate after the biopsy was performed.
Consent: Written consent was obtained and risks were reviewed including but not limited to scarring, infection, bleeding, scabbing, incomplete removal, nerve damage and allergy to anesthesia.
Post-Care Instructions: I reviewed with the patient in detail post-care instructions. Patient is to keep the biopsy site dry overnight, and then apply vaseline twice daily until healed. Patient may apply hydrogen peroxide soaks to remove any crusting.

## 2017-11-15 ENCOUNTER — APPOINTMENT (OUTPATIENT)
Age: 82
Setting detail: DERMATOLOGY
End: 2017-11-20

## 2017-11-15 DIAGNOSIS — Z48.1 ENCOUNTER FOR PLANNED POSTPROCEDURAL WOUND CLOSURE: ICD-10-CM

## 2017-11-15 PROBLEM — C44.41 BASAL CELL CARCINOMA OF SKIN OF SCALP AND NECK: Status: ACTIVE | Noted: 2017-11-15

## 2017-11-15 PROCEDURE — OTHER MOHS SURGERY: OTHER

## 2017-11-15 PROCEDURE — 17311 MOHS 1 STAGE H/N/HF/G: CPT

## 2017-11-15 PROCEDURE — 14041 TIS TRNFR F/C/C/M/N/A/G/H/F: CPT

## 2017-11-15 PROCEDURE — OTHER CONSULTATION FOR MOHS SURGERY: OTHER

## 2017-11-15 PROCEDURE — OTHER CONSULTATION FOR SURGICAL REPAIR: OTHER

## 2017-11-15 PROCEDURE — 99213 OFFICE O/P EST LOW 20 MIN: CPT | Mod: 57

## 2017-11-15 PROCEDURE — 17312 MOHS ADDL STAGE: CPT

## 2017-11-15 PROCEDURE — OTHER MIPS QUALITY: OTHER

## 2017-11-15 PROCEDURE — OTHER REPAIR NOTE: OTHER

## 2017-11-15 ASSESSMENT — LOCATION SIMPLE DESCRIPTION DERM: LOCATION SIMPLE: SCALP

## 2017-11-15 ASSESSMENT — LOCATION ZONE DERM: LOCATION ZONE: SCALP

## 2017-11-15 ASSESSMENT — LOCATION DETAILED DESCRIPTION DERM: LOCATION DETAILED: RIGHT CENTRAL POSTAURICULAR SKIN

## 2017-11-15 NOTE — PROCEDURE: CONSULTATION FOR MOHS SURGERY
Detail Level: Detailed
X Size Of Lesion In Cm (Optional): 1
Incorporate Mauc In Note: Yes
Name Of The Referring Provider For Procedure: Claudia Holloway MD
Size Of Lesion: 1.3

## 2017-11-15 NOTE — PROCEDURE: CONSULTATION FOR SURGICAL REPAIR
Referring Provider (Optional): Claudia Holloway MD
X Size Of Defect In Cm (Optional): 1.5
Detail Level: Detailed
Size Of Defect: 2

## 2017-11-15 NOTE — PROCEDURE: MIPS QUALITY
Quality 431: Preventive Care And Screening: Unhealthy Alcohol Use - Screening: Patient screened for unhealthy alcohol use using a single question and scores 2 or greater episodes per year and brief intervention did not occur
Quality 111:Pneumonia Vaccination Status For Older Adults: Pneumococcal Vaccination Previously Received
Detail Level: Simple
Quality 110: Preventive Care And Screening: Influenza Immunization: Influenza Immunization previously received during influenza season
Quality 226: Preventive Care And Screening: Tobacco Use: Screening And Cessation Intervention: Patient screened for tobacco and never smoked

## 2017-11-15 NOTE — PROCEDURE: MOHS SURGERY
[address] at [facility]  [address]  [facility], [address] [facility] [address] [facility] [address]               Thank you for choosing us for your health care visit with Isidoro Pickard MD.  We are glad to serve you and happy to fabby Take 1 tablet (500 mg total) by mouth 2 (two) times daily with meals. Commonly known as:  NAPROSYN           Omeprazole 40 MG Cpdr   Take 1 capsule (40 mg total) by mouth daily.            Sulfamethoxazole-TMP -160 MG Tabs per tablet   Take 1 tablet V-Y Flap Text: The defect edges were debeveled with a #15 scalpel blade.  Given the location of the defect, shape of the defect and the proximity to free margins a V-Y flap was deemed most appropriate.  Using a sterile surgical marker, an appropriate advancement flap was drawn incorporating the defect and placing the expected incisions within the relaxed skin tension lines where possible.    The area thus outlined was incised deep to adipose tissue with a #15 scalpel blade.  The skin margins were undermined to an appropriate distance in all directions utilizing iris scissors.

## 2017-11-22 ENCOUNTER — APPOINTMENT (OUTPATIENT)
Age: 82
Setting detail: DERMATOLOGY
End: 2017-11-25

## 2017-11-22 DIAGNOSIS — Z48.02 ENCOUNTER FOR REMOVAL OF SUTURES: ICD-10-CM

## 2017-11-22 PROCEDURE — OTHER SUTURE REMOVAL (GLOBAL PERIOD): OTHER

## 2017-11-22 PROCEDURE — 99024 POSTOP FOLLOW-UP VISIT: CPT

## 2017-11-22 ASSESSMENT — LOCATION SIMPLE DESCRIPTION DERM: LOCATION SIMPLE: SCALP

## 2017-11-22 ASSESSMENT — LOCATION ZONE DERM: LOCATION ZONE: SCALP

## 2017-11-22 ASSESSMENT — LOCATION DETAILED DESCRIPTION DERM: LOCATION DETAILED: RIGHT INFERIOR POSTAURICULAR SKIN

## 2017-11-22 NOTE — PROCEDURE: SUTURE REMOVAL (GLOBAL PERIOD)
Detail Level: Detailed
Add 54685 Cpt? (Important Note: In 2017 The Use Of 85778 Is Being Tracked By Cms To Determine Future Global Period Reimbursement For Global Periods): yes

## 2018-02-22 ENCOUNTER — APPOINTMENT (OUTPATIENT)
Age: 83
Setting detail: DERMATOLOGY
End: 2018-02-23

## 2018-02-22 DIAGNOSIS — L57.0 ACTINIC KERATOSIS: ICD-10-CM

## 2018-02-22 DIAGNOSIS — L90.5 SCAR CONDITIONS AND FIBROSIS OF SKIN: ICD-10-CM

## 2018-02-22 DIAGNOSIS — L57.8 OTHER SKIN CHANGES DUE TO CHRONIC EXPOSURE TO NONIONIZING RADIATION: ICD-10-CM

## 2018-02-22 DIAGNOSIS — L81.0 POSTINFLAMMATORY HYPERPIGMENTATION: ICD-10-CM

## 2018-02-22 PROCEDURE — OTHER COUNSELING: OTHER

## 2018-02-22 PROCEDURE — OTHER LIQUID NITROGEN: OTHER

## 2018-02-22 PROCEDURE — 17004 DESTROY PREMAL LESIONS 15/>: CPT

## 2018-02-22 PROCEDURE — 99213 OFFICE O/P EST LOW 20 MIN: CPT | Mod: 25

## 2018-02-22 PROCEDURE — OTHER MIPS QUALITY: OTHER

## 2018-02-22 ASSESSMENT — LOCATION SIMPLE DESCRIPTION DERM
LOCATION SIMPLE: RIGHT OCCIPITAL SCALP
LOCATION SIMPLE: NECK
LOCATION SIMPLE: LEFT CHEEK
LOCATION SIMPLE: LEFT SCALP
LOCATION SIMPLE: POSTERIOR NECK
LOCATION SIMPLE: RIGHT ANTERIOR NECK
LOCATION SIMPLE: LEFT PRETIBIAL REGION
LOCATION SIMPLE: SCALP

## 2018-02-22 ASSESSMENT — LOCATION DETAILED DESCRIPTION DERM
LOCATION DETAILED: RIGHT CENTRAL PARIETAL SCALP
LOCATION DETAILED: RIGHT CLAVICULAR NECK
LOCATION DETAILED: RIGHT INFERIOR ANTERIOR NECK
LOCATION DETAILED: LEFT CENTRAL BUCCAL CHEEK
LOCATION DETAILED: RIGHT SUPERIOR PARIETAL SCALP
LOCATION DETAILED: RIGHT SUPERIOR OCCIPITAL SCALP
LOCATION DETAILED: RIGHT LATERAL NECK
LOCATION DETAILED: LEFT MEDIAL DISTAL PRETIBIAL REGION
LOCATION DETAILED: LEFT SUPERIOR CENTRAL MALAR CHEEK
LOCATION DETAILED: LEFT SUPERIOR LATERAL NECK
LOCATION DETAILED: RIGHT SUPERIOR LATERAL NECK
LOCATION DETAILED: LEFT MEDIAL FRONTAL SCALP

## 2018-02-22 ASSESSMENT — LOCATION ZONE DERM
LOCATION ZONE: SCALP
LOCATION ZONE: NECK
LOCATION ZONE: FACE
LOCATION ZONE: LEG

## 2018-02-22 NOTE — PROCEDURE: MIPS QUALITY
Quality 226: Preventive Care And Screening: Tobacco Use: Screening And Cessation Intervention: Patient screened for tobacco and never smoked
Quality 431: Preventive Care And Screening: Unhealthy Alcohol Use - Screening: Patient screened for unhealthy alcohol use using a single question and scores less than 2 times per year
Detail Level: Zone
Quality 110: Preventive Care And Screening: Influenza Immunization: Influenza Immunization Administered during Influenza season

## 2018-02-22 NOTE — PROCEDURE: LIQUID NITROGEN
Render Post-Care Instructions In Note?: no
Duration Of Freeze Thaw-Cycle (Seconds): 5
Number Of Freeze-Thaw Cycles: 2 freeze-thaw cycles
Consent: The patient's written consent was obtained including but not limited to risks of crusting, scabbing, blistering, scarring, darker or lighter pigmentary change, recurrence, incomplete removal and infection.
Detail Level: Simple
Post-Care Instructions: I reviewed with the patient in detail post-care instructions. Patient is to wear sunprotection, and avoid picking at any of the treated lesions. Pt may apply Vaseline to crusted or scabbing areas.  Written consent was obtained.

## 2018-04-19 ENCOUNTER — TELEPHONE (OUTPATIENT)
Dept: CARDIOLOGY | Facility: MEDICAL CENTER | Age: 83
End: 2018-04-19

## 2018-04-19 NOTE — TELEPHONE ENCOUNTER
----- Message from Jessica Velez sent at 4/18/2018  1:44 PM PDT -----  Regarding: insurance issues with his Benicar  CW/Ciarra      Patient said his insurance will no longer cover Benicar medication he has been taking for years. He's calling to discuss an alternative medication. He would like a call back at [phone].

## 2018-04-20 ENCOUNTER — TELEPHONE (OUTPATIENT)
Dept: CARDIOLOGY | Facility: MEDICAL CENTER | Age: 83
End: 2018-04-20

## 2018-04-20 NOTE — TELEPHONE ENCOUNTER
PAR pending and in process for olmesartan (BENICAR) 40 MG Tab    Patient plan#[phone]  ID#[id number]  REF#[id number]

## 2018-04-23 NOTE — TELEPHONE ENCOUNTER
Advised pt according to med list, this med was prescribed by endocrinology.  Pt states he could call them, but seemed reluctant to do so.  Pt also states he sent a message to CW about this, but has not heard back yet either.  Advised pt will check with CW about changing him from Benicar as insurance no longer covers the med and see what CW would like to do.  Pt takes Benicar 20mg daily.    To CW to advise.

## 2018-04-23 NOTE — TELEPHONE ENCOUNTER
Ulices Mcintyre M.D.  Elba Alfaro R.N.   Caller: Unspecified (4 days ago,  1:36 PM)             He could take losartan 50 mg or lisinopril 20 mg daily     Thank you      LM for pt to call back.

## 2018-04-30 NOTE — TELEPHONE ENCOUNTER
Patient's plan approved:  olmesartan (BENICAR) 40 MG Tab  After further review, approval letter will be put in the scan basket back

## 2018-04-30 NOTE — TELEPHONE ENCOUNTER
The PAR was approved for the Benicar, therefore pt should not need to choose an alternative at this point.    LM notifying pt of this information.

## 2018-04-30 NOTE — TELEPHONE ENCOUNTER
Attempted to call Dr Marquez went immediately to voicemail.  I left him a message that we could send it whatever option he would like for a formulary alternative    Please call him and see what he would like to take whether is is   losartan 50 mg daily  vasartan 160 daily  Lisinopril 20 mg daily  Or any other formulary options    Thank you    Electronically signed: Ulices Mcintyre MD PhD FACC  Cardiologist [facility]

## 2018-05-03 ENCOUNTER — APPOINTMENT (OUTPATIENT)
Age: 83
Setting detail: DERMATOLOGY
End: 2018-05-05

## 2018-05-03 DIAGNOSIS — Z86.006 PERSONAL HISTORY OF MELANOMA IN-SITU: ICD-10-CM

## 2018-05-03 DIAGNOSIS — Z71.89 OTHER SPECIFIED COUNSELING: ICD-10-CM

## 2018-05-03 DIAGNOSIS — Z85.828 PERSONAL HISTORY OF OTHER MALIGNANT NEOPLASM OF SKIN: ICD-10-CM

## 2018-05-03 DIAGNOSIS — Z86.007 PERSONAL HISTORY OF IN-SITU NEOPLASM OF SKIN: ICD-10-CM

## 2018-05-03 DIAGNOSIS — L11.1 TRANSIENT ACANTHOLYTIC DERMATOSIS [GROVER]: ICD-10-CM

## 2018-05-03 DIAGNOSIS — L57.8 OTHER SKIN CHANGES DUE TO CHRONIC EXPOSURE TO NONIONIZING RADIATION: ICD-10-CM

## 2018-05-03 DIAGNOSIS — L57.0 ACTINIC KERATOSIS: ICD-10-CM

## 2018-05-03 PROBLEM — Z85.820 PERSONAL HISTORY OF MALIGNANT MELANOMA OF SKIN: Status: ACTIVE | Noted: 2018-05-03

## 2018-05-03 PROCEDURE — OTHER PRESCRIPTION: OTHER

## 2018-05-03 PROCEDURE — OTHER COUNSELING: OTHER

## 2018-05-03 PROCEDURE — 99213 OFFICE O/P EST LOW 20 MIN: CPT

## 2018-05-03 PROCEDURE — OTHER TREATMENT REGIMEN: OTHER

## 2018-05-03 RX ORDER — FLUOROURACIL 50 MG/G
CREAM TOPICAL
Qty: 1 | Refills: 1 | COMMUNITY
Start: 2018-05-03

## 2018-05-03 ASSESSMENT — LOCATION DETAILED DESCRIPTION DERM
LOCATION DETAILED: RIGHT INFERIOR MEDIAL MIDBACK
LOCATION DETAILED: RIGHT CENTRAL POSTAURICULAR SKIN
LOCATION DETAILED: RIGHT SUPERIOR LATERAL LOWER BACK
LOCATION DETAILED: RIGHT CENTRAL MALAR CHEEK
LOCATION DETAILED: RIGHT MEDIAL PROXIMAL PRETIBIAL REGION
LOCATION DETAILED: LEFT MEDIAL UPPER BACK
LOCATION DETAILED: RIGHT PROXIMAL DORSAL FOREARM
LOCATION DETAILED: RIGHT MEDIAL UPPER BACK

## 2018-05-03 ASSESSMENT — LOCATION ZONE DERM
LOCATION ZONE: TRUNK
LOCATION ZONE: ARM
LOCATION ZONE: LEG
LOCATION ZONE: SCALP
LOCATION ZONE: FACE

## 2018-05-03 ASSESSMENT — LOCATION SIMPLE DESCRIPTION DERM
LOCATION SIMPLE: RIGHT LOWER BACK
LOCATION SIMPLE: RIGHT CHEEK
LOCATION SIMPLE: LEFT UPPER BACK
LOCATION SIMPLE: RIGHT FOREARM
LOCATION SIMPLE: SCALP
LOCATION SIMPLE: RIGHT PRETIBIAL REGION
LOCATION SIMPLE: RIGHT UPPER BACK

## 2018-05-31 ENCOUNTER — OFFICE VISIT (OUTPATIENT)
Dept: ENDOCRINOLOGY | Facility: MEDICAL CENTER | Age: 83
End: 2018-05-31
Payer: MEDICARE

## 2018-05-31 VITALS
BODY MASS INDEX: 21.94 KG/M2 | DIASTOLIC BLOOD PRESSURE: 68 MMHG | HEART RATE: 107 BPM | HEIGHT: 72 IN | SYSTOLIC BLOOD PRESSURE: 122 MMHG | OXYGEN SATURATION: 98 % | WEIGHT: 162 LBS

## 2018-05-31 DIAGNOSIS — E11.29 TYPE 2 DIABETES MELLITUS WITH OTHER DIABETIC KIDNEY COMPLICATION, WITHOUT LONG-TERM CURRENT USE OF INSULIN (HCC): ICD-10-CM

## 2018-05-31 DIAGNOSIS — I10 ESSENTIAL HYPERTENSION, BENIGN: ICD-10-CM

## 2018-05-31 DIAGNOSIS — E78.2 MIXED HYPERLIPIDEMIA: ICD-10-CM

## 2018-05-31 DIAGNOSIS — N40.0 BENIGN PROSTATIC HYPERPLASIA, UNSPECIFIED WHETHER LOWER URINARY TRACT SYMPTOMS PRESENT: ICD-10-CM

## 2018-05-31 DIAGNOSIS — E53.8 VITAMIN B12 DEFICIENCY: ICD-10-CM

## 2018-05-31 DIAGNOSIS — E03.9 HYPOTHYROIDISM, UNSPECIFIED TYPE: ICD-10-CM

## 2018-05-31 DIAGNOSIS — E55.9 VITAMIN D DEFICIENCY: ICD-10-CM

## 2018-05-31 PROCEDURE — 99214 OFFICE O/P EST MOD 30 MIN: CPT | Performed by: INTERNAL MEDICINE

## 2018-06-01 NOTE — PROGRESS NOTES
Endocrinology Clinic Progress Note  PCP: Violette Huntley M.D.    HPI:  Juanito Marquez is a 82 y.o. old patient who comes in today for review of Management of Controlled Type 2 Diabetes    HPI:  Juanito Marquez is a 82 y.o. old patient who comes in today for evaluation of above stated problem.    Most Recent HbA1c:   Lab Results   Component Value Date/Time    HBA1C 6.8 (H) 06/29/2017 09:38 AM    HBA1C 7.6 06/14/2016 11:52 AM        Current Diabetes Regimen:  GLP-1 Agent: Exenatide once weekly (2mg)   SGLT-2 Inhibitor:  Empagliflozin 10 mg once daily   Other: glimperide 2 mg in am and 1 mg in pm.    Before Breakfast:  range.   Before Lunch:   range.   Before Dinner:   range.   Before Bedtime:   range.   Other times:  Hypoglycemia:  Occasional    ROS:  Constitutional: No weight loss  Cardiac: No palpitations or racing heart  Resp: No shortness of breath  Neuro: No numbness or tinging in feet  Endo: No heat or cold intolerance, no polyuria or polydipsia  All other systems were reviewed and were negative.    Past Medical History:  Patient Active Problem List    Diagnosis Date Noted   • Type 2 diabetes mellitus, controlled ([facility]) 10/14/2015   • Abnormal thyroid exam 06/30/2014   • Keratosis, actinic 06/03/2014   • S/P CABG (coronary artery bypass graft) 06/27/2013   • Hypothyroidism 07/19/2012   • Renal insufficiency 08/08/2011   • Dyslipidemia    • Essential hypertension, benign    • Coronary artery disease due to lipid rich plaque    • Back pain 06/23/2009       Past Surgical History:  Past Surgical History:   Procedure Laterality Date   • CHOLECYSTECTOMY     • COLECTOMY      FOR BENIGN ADENOMA   • MULTIPLE CORONARY ARTERY BYPASS     • OTHER      LOWER BACK SURGERY.       Allergies:  Patient has no known allergies.    Social History:  Social History     Social History   • Marital status:      Spouse name: N/A   • Number of children: N/A   • Years of education: N/A     Occupational  History   • Not on file.     Social History Main Topics   • Smoking status: Never Smoker   • Smokeless tobacco: Never Used   • Alcohol use 2.4 oz/week     4 Glasses of wine per week   • Drug use: No   • Sexual activity: Not Currently     Other Topics Concern   • Not on file     Social History Narrative   • No narrative on file       Family History:  Family History   Problem Relation Age of Onset   • Cancer Mother      breast   • Heart Disease Mother    • Diabetes Father    • Cancer Father      bladder   • Other Father      ruptured appendix   • Diabetes Paternal Grandfather        Medications:    Current Outpatient Prescriptions:   •  NIACINAMIDE PO, Take 50 mg by mouth 2 Times a Day., Disp: , Rfl:   •  Empagliflozin 10 MG Tab, Take 1 tablet by mouth every morning with breakfast., Disp: 90 Tab, Rfl: 3  •  Exenatide ER (BYDUREON) 2 MG Pen-injector, Inject 2 mg as instructed every 7 days., Disp: 12 Each, Rfl: 3  •  glimepiride (AMARYL) 1 MG tablet, 2 mg in the morning and 1 mg before dinner, Disp: 270 Tab, Rfl: 3  •  levothyroxine (SYNTHROID) 100 MCG Tab, Take 1 Tab by mouth Every morning on an empty stomach., Disp: 90 Tab, Rfl: 3  •  rosuvastatin (CRESTOR) 10 MG Tab, Take 1 Tab by mouth every evening., Disp: 90 Tab, Rfl: 3  •  olmesartan (BENICAR) 40 MG Tab, Take 1 Tab by mouth every day. (Patient taking differently: Take 20 mg by mouth every day.), Disp: 90 Tab, Rfl: 3  •  ascorbic acid (ASCORBIC ACID) 500 MG TABS, Take 1,000 mg by mouth every day., Disp: , Rfl:   •  Coenzyme Q10 (CO Q 10) 10 MG CAPS, Take  by mouth.  , Disp: , Rfl:   •  Omega-3 Fatty Acids (FISH OIL) 1200 MG CAPS, Take 3 Caps by mouth every day., Disp: , Rfl:   •  Cholecalciferol (VITAMIN D) 2000 UNIT TABS, Take 1 Tab by mouth every day., Disp: , Rfl:   •  aspirin 81 MG tablet, Take 81 mg by mouth every day., Disp: , Rfl:   •  Insulin Pen Needle (B-D UF III MINI PEN NEEDLES) 31G X 5 MM MISC, 1 Each by Does not apply route every day., Disp: 100  Each, Rfl: 9    Labs: Reviewed    Physical Examination:  Vital signs: /68   Pulse (!) 107   Ht 1.829 m (6')   Wt 73.5 kg (162 lb)   SpO2 98%   BMI 21.97 kg/m²  Body mass index is 21.97 kg/m².  General: No apparent distress, cooperative  Eyes: No scleral icterus or discharge  ENMT: Normal on external inspection of nose, lips, normal thyroid exam  Neck: No abnormal masses on inspection  Resp: Normal effort, clear to auscultation bilaterally   CVS: Regular rate and rhythm, S1 S2 normal, no murmur   Extremities: No edema  Abdomen: abdominal obesity present  Neuro: Alert and oriented  Skin: No rash  Psych: Normal mood and affect, intact memory and able to make informed decisions    Assessment and Plan:    1. Essential hypertension, benign  Controlled.   - COMP METABOLIC PANEL; Future  - MICROALBUMIN CREAT RATIO URINE; Future  - CBC WITH DIFFERENTIAL; Future    2. Type 2 diabetes mellitus with other diabetic kidney complication, without long-term current use of insulin (HCC)  Controlled.   - COMP METABOLIC PANEL; Future  - MICROALBUMIN CREAT RATIO URINE; Future  - CBC WITH DIFFERENTIAL; Future    3. Mixed hyperlipidemia  Stable on statins.   - LIPID PROFILE; Future  - CBC WITH DIFFERENTIAL; Future    4. Vitamin B12 deficiency  To be ruled out.   -  5. Vitamin D deficiency  Check levels.     6. Hypothyroidism, unspecified type  On 100 mcg of levothyroxine daily. Feels fine.   - FREE THYROXINE; Future  - TSH; Future    Return in about 6 months (around 11/30/2018).    Thank you for allowing me to participate in the care of this patient.    Darrion Mcbride M.D.  05/31/18    CC:   Violette Huntley M.D.    This note was created using voice recognition software (Dragon). The accuracy of the dictation is limited by the abilities of the software. I have reviewed the note prior to signing, however some errors in grammar and context are still possible. If you have any questions related to this note please do not hesitate  to contact our office.

## 2018-06-19 LAB
ALBUMIN SERPL-MCNC: 4.2 G/DL (ref 3.5–4.7)
ALBUMIN/CREAT UR: 3 MG/G CREAT (ref 0–30)
ALBUMIN/GLOB SERPL: 1.9 {RATIO} (ref 1.2–2.2)
ALP SERPL-CCNC: 65 IU/L (ref 39–117)
ALT SERPL-CCNC: 23 IU/L (ref 0–44)
AST SERPL-CCNC: 25 IU/L (ref 0–40)
BASOPHILS # BLD AUTO: 0 X10E3/UL (ref 0–0.2)
BASOPHILS NFR BLD AUTO: 0 %
BILIRUB SERPL-MCNC: 0.8 MG/DL (ref 0–1.2)
BUN SERPL-MCNC: 32 MG/DL (ref 8–27)
BUN/CREAT SERPL: 16 (ref 10–24)
CALCIUM SERPL-MCNC: 9.2 MG/DL (ref 8.6–10.2)
CHLORIDE SERPL-SCNC: 108 MMOL/L (ref 96–106)
CHOLEST SERPL-MCNC: 115 MG/DL (ref 100–199)
CO2 SERPL-SCNC: 21 MMOL/L (ref 20–29)
CREAT SERPL-MCNC: 1.96 MG/DL (ref 0.76–1.27)
CREAT UR-MCNC: 112.5 MG/DL
EOSINOPHIL # BLD AUTO: 0.1 X10E3/UL (ref 0–0.4)
EOSINOPHIL NFR BLD AUTO: 2 %
ERYTHROCYTE [DISTWIDTH] IN BLOOD BY AUTOMATED COUNT: 13.6 % (ref 12.3–15.4)
GFR SERPLBLD CREATININE-BSD FMLA CKD-EPI: 31 ML/MIN/1.73
GFR SERPLBLD CREATININE-BSD FMLA CKD-EPI: 36 ML/MIN/1.73
GLOBULIN SER CALC-MCNC: 2.2 G/DL (ref 1.5–4.5)
GLUCOSE SERPL-MCNC: 145 MG/DL (ref 65–99)
HCT VFR BLD AUTO: 43.4 % (ref 37.5–51)
HDLC SERPL-MCNC: 65 MG/DL
HGB BLD-MCNC: 14.6 G/DL (ref 13–17.7)
IMM GRANULOCYTES # BLD: 0 X10E3/UL (ref 0–0.1)
IMM GRANULOCYTES NFR BLD: 0 %
IMMATURE CELLS  115398: ABNORMAL
LABORATORY COMMENT REPORT: NORMAL
LDLC SERPL CALC-MCNC: 36 MG/DL (ref 0–99)
LYMPHOCYTES # BLD AUTO: 0.9 X10E3/UL (ref 0.7–3.1)
LYMPHOCYTES NFR BLD AUTO: 14 %
MCH RBC QN AUTO: 32.9 PG (ref 26.6–33)
MCHC RBC AUTO-ENTMCNC: 33.6 G/DL (ref 31.5–35.7)
MCV RBC AUTO: 98 FL (ref 79–97)
MICROALBUMIN UR-MCNC: 3.4 UG/ML
MONOCYTES # BLD AUTO: 0.8 X10E3/UL (ref 0.1–0.9)
MONOCYTES NFR BLD AUTO: 12 %
MORPHOLOGY BLD-IMP: ABNORMAL
NEUTROPHILS # BLD AUTO: 4.7 X10E3/UL (ref 1.4–7)
NEUTROPHILS NFR BLD AUTO: 72 %
NRBC BLD AUTO-RTO: ABNORMAL %
PLATELET # BLD AUTO: 170 X10E3/UL (ref 150–379)
POTASSIUM SERPL-SCNC: 5.1 MMOL/L (ref 3.5–5.2)
PROT SERPL-MCNC: 6.4 G/DL (ref 6–8.5)
PSA SERPL-MCNC: 0.6 NG/ML (ref 0–4)
RBC # BLD AUTO: 4.44 X10E6/UL (ref 4.14–5.8)
SODIUM SERPL-SCNC: 143 MMOL/L (ref 134–144)
T4 FREE SERPL-MCNC: 1.99 NG/DL (ref 0.82–1.77)
TRIGL SERPL-MCNC: 69 MG/DL (ref 0–149)
TSH SERPL DL<=0.005 MIU/L-ACNC: 0.04 UIU/ML (ref 0.45–4.5)
VLDLC SERPL CALC-MCNC: 14 MG/DL (ref 5–40)
WBC # BLD AUTO: 6.6 X10E3/UL (ref 3.4–10.8)

## 2018-06-29 ENCOUNTER — OFFICE VISIT (OUTPATIENT)
Dept: PULMONOLOGY | Facility: HOSPICE | Age: 83
End: 2018-06-29
Payer: MEDICARE

## 2018-06-29 VITALS
BODY MASS INDEX: 21.54 KG/M2 | RESPIRATION RATE: 14 BRPM | WEIGHT: 159 LBS | HEIGHT: 72 IN | HEART RATE: 82 BPM | TEMPERATURE: 97.9 F | SYSTOLIC BLOOD PRESSURE: 116 MMHG | OXYGEN SATURATION: 99 % | DIASTOLIC BLOOD PRESSURE: 68 MMHG

## 2018-06-29 DIAGNOSIS — G47.33 OSA (OBSTRUCTIVE SLEEP APNEA): ICD-10-CM

## 2018-06-29 PROCEDURE — 99214 OFFICE O/P EST MOD 30 MIN: CPT | Performed by: INTERNAL MEDICINE

## 2018-06-29 RX ORDER — NIACIN 500 MG/1
TABLET ORAL
COMMUNITY
End: 2018-10-09

## 2018-06-29 RX ORDER — FLUOROURACIL 50 MG/G
CREAM TOPICAL
COMMUNITY
Start: 2018-06-04 | End: 2019-06-03 | Stop reason: SDUPTHER

## 2018-06-29 NOTE — PROGRESS NOTES
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  When he travels he often does not use it.  His compliance data shows usage of about 53%.  His average usage on the day used is 6 hours 18 minutes.  His resulting AHI on a CPAP of 7 is 6.1.  He has lost some significant weight and wonders if he needs to continue the treatment at all.  On his last visit his weight was 175 pounds.  He is now 159 pounds.  He continues to feel well.  His [employer] company no longer takes Medicare.  He will need a new [employer] company.  He does wonder if with weight loss he may be able to discontinue airway pressurization therapy.    Past Medical History:   Diagnosis Date   • Back pain 6/23/2009   • CAD (coronary artery disease)    • Chronic diarrhea 7/21/2008   • Colon polyp 2007   • DM (diabetes mellitus) (HCC)    • Hyperlipidemia    • Hypertension    • Keratosis, actinic 6/3/2014   • lumbar laminectomy 2010   • Overweight(278.02) 7/21/2008   • S/P right colectomy 2004    benign tubulovillous adenoma   • Sleep apnea    • status post CABG 1994   • Status post cholecystectomy 2004       ROS:   Constitutional: Denies fevers, chills, night sweats, fatigue or weight loss  Eyes: Denies vision loss, pain, drainage, double vision  Ears, Nose, Throat: Denies earache, tinnitus, hoarseness  Cardiovascular: Denies chest pain, tightness, palpitations  Respiratory: Denies shortness of breath, sputum production, cough, hemoptysis  Sleep: See HPI  GI: Denies abdominal pain, nausea, vomiting, diarrhea  : Denies frequent urination, hematuria, painful urination  Musculoskeletal: Denies back pain, painful joints, sore muscles  Neurological: Denies headaches, seizures  Skin: Denies rashes, color changes  Psychiatric: Denies depression or thoughts of suicide  Hematologic: Denies bleeding tendency or clotting tendency  Allergic/Immunologic: Denies rhinitis, skin  sensitivity    Social History     Social History   • Marital status:      Spouse name: N/A   • Number of children: N/A   • Years of education: N/A     Occupational History   • Not on file.     Social History Main Topics   • Smoking status: Never Smoker   • Smokeless tobacco: Never Used   • Alcohol use 2.4 oz/week     4 Glasses of wine per week   • Drug use: No   • Sexual activity: Not Currently     Other Topics Concern   • Not on file     Social History Narrative   • No narrative on file     Patient has no known allergies.  Current Outpatient Prescriptions on File Prior to Visit   Medication Sig Dispense Refill   • Empagliflozin 10 MG Tab Take 1 tablet by mouth every morning with breakfast. 90 Tab 3   • Exenatide ER (BYDUREON) 2 MG Pen-injector Inject 2 mg as instructed every 7 days. 12 Each 3   • glimepiride (AMARYL) 1 MG tablet 2 mg in the morning and 1 mg before dinner 270 Tab 3   • levothyroxine (SYNTHROID) 100 MCG Tab Take 1 Tab by mouth Every morning on an empty stomach. 90 Tab 3   • rosuvastatin (CRESTOR) 10 MG Tab Take 1 Tab by mouth every evening. 90 Tab 3   • olmesartan (BENICAR) 40 MG Tab Take 1 Tab by mouth every day. (Patient taking differently: Take 20 mg by mouth every day.) 90 Tab 3   • ascorbic acid (ASCORBIC ACID) 500 MG TABS Take 1,000 mg by mouth every day.     • Coenzyme Q10 (CO Q 10) 10 MG CAPS Take  by mouth.       • Omega-3 Fatty Acids (FISH OIL) 1200 MG CAPS Take 3 Caps by mouth every day.     • Cholecalciferol (VITAMIN D) 2000 UNIT TABS Take 1 Tab by mouth every day.     • aspirin 81 MG tablet Take 81 mg by mouth every day.     • NIACINAMIDE PO Take 50 mg by mouth 2 Times a Day.     • Insulin Pen Needle (B-D UF III MINI PEN NEEDLES) 31G X 5 MM MISC 1 Each by Does not apply route every day. 100 Each 9     No current facility-administered medications on file prior to visit.      Blood pressure 116/68, pulse 82, temperature 36.6 °C (97.9 °F), resp. rate 14, height 1.829 m (6'), weight  72.1 kg (159 lb), SpO2 99 %.  Family History   Problem Relation Age of Onset   • Cancer Mother      breast   • Heart Disease Mother    • Diabetes Father    • Cancer Father      bladder   • Other Father      ruptured appendix   • Diabetes Paternal Grandfather        Physical Exam:    HEENT: PERRLA, EOMI, no scleral icterus, no nasal or oral lesions  Neck: No thyromegaly, no adenopathy, no bruits  Mallampatti: Grade III  Lungs: Equal breath sounds, no wheezes or crackles  Heart: Regular rate and rhythm, no gallops or murmurs  Abdomen: Soft, benign, no organomegaly  Extremities: No clubbing, cyanosis, or edema  Neurologic: Cranial nerve, motor, and sensory exam are normal    1. FLEX (obstructive sleep apnea)        We will give him a new prescription for mask and supplies.  Referral to [facility]  Arrange for overnight oximetry to see if he has significant oxygen desaturation after his weight loss.  We will call him with results.

## 2018-06-29 NOTE — LETTER
Doug Read M.D.  [facility]   [address] [place], [place] [address], [place] [address]  Phone: [phone] - Fax: [phone]           Encounter Date: 6/29/2018  Provider: Doug Read M.D.  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  When he travels he often does not use it.  His compliance data shows usage of about 53%.  His average usage on the day used is 6 hours 18 minutes.  His resulting AHI on a CPAP of 7 is 6.1.  He has lost some significant weight and wonders if he needs to continue the treatment at all.  On his last visit his weight was 175 pounds.  He is now 159 pounds.  He continues to feel well.  His DME company no longer takes Medicare.  He will need a new DME company.  He does wonder if with weight loss he may be able to discontinue airway pressurization therapy.    Past Medical History:   Diagnosis Date   • Back pain 6/23/2009   • CAD (coronary artery disease)    • Chronic diarrhea 7/21/2008   • Colon polyp 2007   • DM (diabetes mellitus) (HCC)    • Hyperlipidemia    • Hypertension    • Keratosis, actinic 6/3/2014   • lumbar laminectomy 2010   • Overweight(278.02) 7/21/2008   • S/P right colectomy 2004    benign tubulovillous adenoma   • Sleep apnea    • status post CABG 1994   • Status post cholecystectomy 2004       ROS:   Constitutional: Denies fevers, chills, night sweats, fatigue or weight loss  Eyes: Denies vision loss, pain, drainage, double vision  Ears, Nose, Throat: Denies earache, tinnitus, hoarseness  Cardiovascular: Denies chest pain, tightness, palpitations  Respiratory: Denies shortness of breath, sputum production, cough, hemoptysis  Sleep: See HPI  GI: Denies abdominal pain, nausea,  vomiting, diarrhea  : Denies frequent urination, hematuria, painful urination  Musculoskeletal: Denies back pain, painful joints, sore muscles  Neurological: Denies headaches, seizures  Skin: Denies rashes, color changes  Psychiatric: Denies depression or thoughts of suicide  Hematologic: Denies bleeding tendency or clotting tendency  Allergic/Immunologic: Denies rhinitis, skin sensitivity    Social History     Social History   • Marital status:      Spouse name: N/A   • Number of children: N/A   • Years of education: N/A     Occupational History   • Not on file.     Social History Main Topics   • Smoking status: Never Smoker   • Smokeless tobacco: Never Used   • Alcohol use 2.4 oz/week     4 Glasses of wine per week   • Drug use: No   • Sexual activity: Not Currently     Other Topics Concern   • Not on file     Social History Narrative   • No narrative on file     Patient has no known allergies.  Current Outpatient Prescriptions on File Prior to Visit   Medication Sig Dispense Refill   • Empagliflozin 10 MG Tab Take 1 tablet by mouth every morning with breakfast. 90 Tab 3   • Exenatide ER (BYDUREON) 2 MG Pen-injector Inject 2 mg as instructed every 7 days. 12 Each 3   • glimepiride (AMARYL) 1 MG tablet 2 mg in the morning and 1 mg before dinner 270 Tab 3   • levothyroxine (SYNTHROID) 100 MCG Tab Take 1 Tab by mouth Every morning on an empty stomach. 90 Tab 3   • rosuvastatin (CRESTOR) 10 MG Tab Take 1 Tab by mouth every evening. 90 Tab 3   • olmesartan (BENICAR) 40 MG Tab Take 1 Tab by mouth every day. (Patient taking differently: Take 20 mg by mouth every day.) 90 Tab 3   • ascorbic acid (ASCORBIC ACID) 500 MG TABS Take 1,000 mg by mouth every day.     • Coenzyme Q10 (CO Q 10) 10 MG CAPS Take  by mouth.       • Omega-3 Fatty Acids (FISH OIL) 1200 MG CAPS Take 3 Caps by mouth every day.     • Cholecalciferol (VITAMIN D) 2000 UNIT TABS Take 1 Tab by mouth every day.     • aspirin 81 MG tablet Take 81 mg by  mouth every day.     • NIACINAMIDE PO Take 50 mg by mouth 2 Times a Day.     • Insulin Pen Needle (B-D UF III MINI PEN NEEDLES) 31G X 5 MM MISC 1 Each by Does not apply route every day. 100 Each 9     No current facility-administered medications on file prior to visit.      Blood pressure 116/68, pulse 82, temperature 36.6 °C (97.9 °F), resp. rate 14, height 1.829 m (6'), weight 72.1 kg (159 lb), SpO2 99 %.  Family History   Problem Relation Age of Onset   • Cancer Mother      breast   • Heart Disease Mother    • Diabetes Father    • Cancer Father      bladder   • Other Father      ruptured appendix   • Diabetes Paternal Grandfather        Physical Exam:    HEENT: PERRLA, EOMI, no scleral icterus, no nasal or oral lesions  Neck: No thyromegaly, no adenopathy, no bruits  Mallampatti: Grade III  Lungs: Equal breath sounds, no wheezes or crackles  Heart: Regular rate and rhythm, no gallops or murmurs  Abdomen: Soft, benign, no organomegaly  Extremities: No clubbing, cyanosis, or edema  Neurologic: Cranial nerve, motor, and sensory exam are normal    1. FLEX (obstructive sleep apnea)        We will give him a new prescription for mask and supplies.  Referral to [facility]  Arrange for overnight oximetry to see if he has significant oxygen desaturation after his weight loss.  We will call him with results.      Electronically signed by Doug Read M.D.  on 06/29/18    No Recipients

## 2018-07-16 ENCOUNTER — OFFICE VISIT (OUTPATIENT)
Dept: CARDIOLOGY | Facility: MEDICAL CENTER | Age: 83
End: 2018-07-16
Payer: MEDICARE

## 2018-07-16 VITALS
HEIGHT: 72 IN | WEIGHT: 158 LBS | SYSTOLIC BLOOD PRESSURE: 111 MMHG | DIASTOLIC BLOOD PRESSURE: 59 MMHG | RESPIRATION RATE: 12 BRPM | OXYGEN SATURATION: 92 % | BODY MASS INDEX: 21.4 KG/M2 | HEART RATE: 98 BPM

## 2018-07-16 DIAGNOSIS — I10 ESSENTIAL HYPERTENSION, BENIGN: ICD-10-CM

## 2018-07-16 DIAGNOSIS — E78.5 DYSLIPIDEMIA: ICD-10-CM

## 2018-07-16 DIAGNOSIS — I25.10 CORONARY ARTERY DISEASE DUE TO LIPID RICH PLAQUE: ICD-10-CM

## 2018-07-16 DIAGNOSIS — I25.83 CORONARY ARTERY DISEASE DUE TO LIPID RICH PLAQUE: ICD-10-CM

## 2018-07-16 DIAGNOSIS — Z95.1 S/P CABG (CORONARY ARTERY BYPASS GRAFT): ICD-10-CM

## 2018-07-16 PROCEDURE — 99214 OFFICE O/P EST MOD 30 MIN: CPT | Performed by: INTERNAL MEDICINE

## 2018-07-16 ASSESSMENT — ENCOUNTER SYMPTOMS
NAUSEA: 0
FOCAL WEAKNESS: 0
PALPITATIONS: 0
WEAKNESS: 0
CLAUDICATION: 0
FALLS: 0
DIZZINESS: 0
BLURRED VISION: 0
FEVER: 0
ABDOMINAL PAIN: 0
CHILLS: 0
COUGH: 0
BRUISES/BLEEDS EASILY: 0
SHORTNESS OF BREATH: 0
SORE THROAT: 0
PND: 0

## 2018-07-16 NOTE — LETTER
[facility]   [address] [place] [address]  JENNIFER Stauffer [address]  Phone: [phone]  Fax: [phone]              Juanito Marquez  1935    Encounter Date: 7/16/2018    Ulices Mcintyre M.D.          PROGRESS NOTE:  Chief complaint follow-up of coronary disease status post bypass hypertension    Subjective:   Juanito Marquez is a 82 y.o. male who presents today for follow-up of his history of coronary disease with prior history of mildly positive stress test but no angina  He does report as last year it takes longer to adapt to the high-altitude [place]  There was an issue with the formulary on his Benicar but this has been sorted out  He is going to establish with primary care    He continues to lose weight which he attributes to Jardiance     Past Medical History:   Diagnosis Date   • Back pain 6/23/2009   • CAD (coronary artery disease)    • Chronic diarrhea 7/21/2008   • Colon polyp 2007   • DM (diabetes mellitus) (HCC)    • Hyperlipidemia    • Hypertension    • Keratosis, actinic 6/3/2014   • lumbar laminectomy 2010   • Overweight(278.02) 7/21/2008   • S/P right colectomy 2004    benign tubulovillous adenoma   • Sleep apnea    • status post CABG 1994   • Status post cholecystectomy 2004     Past Surgical History:   Procedure Laterality Date   • CHOLECYSTECTOMY     • COLECTOMY      FOR BENIGN ADENOMA   • MULTIPLE CORONARY ARTERY BYPASS     • OTHER      LOWER BACK SURGERY.     Family History   Problem Relation Age of Onset   • Cancer Mother      breast   • Heart Disease Mother    • Diabetes Father    • Cancer Father      bladder   • Other Father      ruptured appendix   • Diabetes Paternal Grandfather      Social History     Social History   • Marital status:      Spouse name: N/A   • Number of children: N/A   • Years of education: N/A     Occupational History   • Not on file.        Social History Main Topics   • Smoking status: Never Smoker   • Smokeless tobacco: Never Used   • Alcohol use 2.4 oz/week     4 Glasses of wine per week   • Drug use: No   • Sexual activity: Not Currently     Other Topics Concern   • Not on file     Social History Narrative   • No narrative on file     No Known Allergies  Outpatient Encounter Prescriptions as of 7/16/2018   Medication Sig Dispense Refill   • fluorouracil (EFUDEX) 5 % cream      • Niacin, Antihyperlipidemic, 500 MG Tab Take  by mouth.     • Empagliflozin 10 MG Tab Take 1 tablet by mouth every morning with breakfast. 90 Tab 3   • Exenatide ER (BYDUREON) 2 MG Pen-injector Inject 2 mg as instructed every 7 days. 12 Each 3   • glimepiride (AMARYL) 1 MG tablet 2 mg in the morning and 1 mg before dinner (Patient taking differently: 1 mg 2 times a day. 1 mg in the morning and 1 mg before dinner) 270 Tab 3   • levothyroxine (SYNTHROID) 100 MCG Tab Take 1 Tab by mouth Every morning on an empty stomach. 90 Tab 3   • rosuvastatin (CRESTOR) 10 MG Tab Take 1 Tab by mouth every evening. 90 Tab 3   • olmesartan (BENICAR) 40 MG Tab Take 1 Tab by mouth every day. (Patient taking differently: Take 20 mg by mouth every day.) 90 Tab 3   • Insulin Pen Needle (B-D UF III MINI PEN NEEDLES) 31G X 5 MM MISC 1 Each by Does not apply route every day. 100 Each 9   • ascorbic acid (ASCORBIC ACID) 500 MG TABS Take 1,000 mg by mouth every day.     • Coenzyme Q10 (CO Q 10) 10 MG CAPS Take  by mouth.       • Omega-3 Fatty Acids (FISH OIL) 1200 MG CAPS Take 3 Caps by mouth every day.     • Cholecalciferol (VITAMIN D) 2000 UNIT TABS Take 1 Tab by mouth every day.     • aspirin 81 MG tablet Take 81 mg by mouth every day.     • NIACINAMIDE PO Take 50 mg by mouth 2 Times a Day.       No facility-administered encounter medications on file as of 7/16/2018.      Review of Systems   Constitutional: Negative for chills and fever.   HENT: Negative for sore throat.    Eyes: Negative for  blurred vision.   Respiratory: Negative for cough and shortness of breath.    Cardiovascular: Negative for chest pain, palpitations, claudication, leg swelling and PND.   Gastrointestinal: Negative for abdominal pain and nausea.   Musculoskeletal: Negative for falls and joint pain.   Skin: Negative for rash.   Neurological: Negative for dizziness, focal weakness and weakness.   Endo/Heme/Allergies: Does not bruise/bleed easily.     He does now report nocturia       Objective:   /59   Pulse 98   Resp 12   Ht 1.829 m (6')   Wt 71.7 kg (158 lb)   SpO2 92%   BMI 21.43 kg/m²      Physical Exam   Constitutional: No distress.   HENT:   Mouth/Throat: Oropharynx is clear and moist. No oropharyngeal exudate.   Eyes: No scleral icterus.   Neck: No JVD present.   Cardiovascular: Normal rate and normal heart sounds.  Exam reveals no gallop and no friction rub.    No murmur heard.  Pulmonary/Chest: No respiratory distress. He has no wheezes. He has no rales.   Abdominal: Soft. Bowel sounds are normal.   Musculoskeletal: He exhibits no edema.   Neurological: He is alert.   Skin: No rash noted. He is not diaphoretic.   Psychiatric: He has a normal mood and affect.       Assessment:     1. Coronary artery disease due to lipid rich plaque     2. Dyslipidemia     3. Essential hypertension, benign     4. S/P CABG (coronary artery bypass graft)         Medical Decision Making:  Today's Assessment / Status / Plan:     It was my pleasure to meet with Mr. Marquez.    He is on proper medical therapy for his coronary disease status post CABG     tolerating hismedications well     no significant change in his chronic Shortness of breath with altitude    Blood pressure is well controlled labs from June reviewed    Stable chronic kidney disease GFR is 31 creatinine 2 HDL 65 LDL 36    I will see Mr. Marquez back in 1 year time and encouraged him to follow up with us over the phone or e-mail using my MyChart as issues arise.    It is my  pleasure to participate in the care of Mr. Marquez.  Please do not hesitate to contact me with questions or concerns.    Ulices Mcintyre MD PhD [facility]  Cardiologist [facility]          Virginia Morales M.D.  [address] [address]  VIA Facsimile: [phone]

## 2018-07-16 NOTE — PROGRESS NOTES
Chief complaint follow-up of coronary disease status post bypass hypertension    Subjective:   Juanito Marquez is a 82 y.o. male who presents today for follow-up of his history of coronary disease with prior history of mildly positive stress test but no angina  He does report as last year it takes longer to adapt to the high-altitude [place]  There was an issue with the formulary on his Benicar but this has been sorted out  He is going to establish with primary care    He continues to lose weight which he attributes to Jardiance     Past Medical History:   Diagnosis Date   • Back pain 6/23/2009   • CAD (coronary artery disease)    • Chronic diarrhea 7/21/2008   • Colon polyp 2007   • DM (diabetes mellitus) (HCC)    • Hyperlipidemia    • Hypertension    • Keratosis, actinic 6/3/2014   • lumbar laminectomy 2010   • Overweight(278.02) 7/21/2008   • S/P right colectomy 2004    benign tubulovillous adenoma   • Sleep apnea    • status post CABG 1994   • Status post cholecystectomy 2004     Past Surgical History:   Procedure Laterality Date   • CHOLECYSTECTOMY     • COLECTOMY      FOR BENIGN ADENOMA   • MULTIPLE CORONARY ARTERY BYPASS     • OTHER      LOWER BACK SURGERY.     Family History   Problem Relation Age of Onset   • Cancer Mother      breast   • Heart Disease Mother    • Diabetes Father    • Cancer Father      bladder   • Other Father      ruptured appendix   • Diabetes Paternal Grandfather      Social History     Social History   • Marital status:      Spouse name: N/A   • Number of children: N/A   • Years of education: N/A     Occupational History   • Not on file.     Social History Main Topics   • Smoking status: Never Smoker   • Smokeless tobacco: Never Used   • Alcohol use 2.4 oz/week     4 Glasses of wine per week   • Drug use: No   • Sexual activity: Not Currently     Other Topics Concern   • Not on file     Social History  Narrative   • No narrative on file     No Known Allergies  Outpatient Encounter Prescriptions as of 7/16/2018   Medication Sig Dispense Refill   • fluorouracil (EFUDEX) 5 % cream      • Niacin, Antihyperlipidemic, 500 MG Tab Take  by mouth.     • Empagliflozin 10 MG Tab Take 1 tablet by mouth every morning with breakfast. 90 Tab 3   • Exenatide ER (BYDUREON) 2 MG Pen-injector Inject 2 mg as instructed every 7 days. 12 Each 3   • glimepiride (AMARYL) 1 MG tablet 2 mg in the morning and 1 mg before dinner (Patient taking differently: 1 mg 2 times a day. 1 mg in the morning and 1 mg before dinner) 270 Tab 3   • levothyroxine (SYNTHROID) 100 MCG Tab Take 1 Tab by mouth Every morning on an empty stomach. 90 Tab 3   • rosuvastatin (CRESTOR) 10 MG Tab Take 1 Tab by mouth every evening. 90 Tab 3   • olmesartan (BENICAR) 40 MG Tab Take 1 Tab by mouth every day. (Patient taking differently: Take 20 mg by mouth every day.) 90 Tab 3   • Insulin Pen Needle (B-D UF III MINI PEN NEEDLES) 31G X 5 MM MISC 1 Each by Does not apply route every day. 100 Each 9   • ascorbic acid (ASCORBIC ACID) 500 MG TABS Take 1,000 mg by mouth every day.     • Coenzyme Q10 (CO Q 10) 10 MG CAPS Take  by mouth.       • Omega-3 Fatty Acids (FISH OIL) 1200 MG CAPS Take 3 Caps by mouth every day.     • Cholecalciferol (VITAMIN D) 2000 UNIT TABS Take 1 Tab by mouth every day.     • aspirin 81 MG tablet Take 81 mg by mouth every day.     • NIACINAMIDE PO Take 50 mg by mouth 2 Times a Day.       No facility-administered encounter medications on file as of 7/16/2018.      Review of Systems   Constitutional: Negative for chills and fever.   HENT: Negative for sore throat.    Eyes: Negative for blurred vision.   Respiratory: Negative for cough and shortness of breath.    Cardiovascular: Negative for chest pain, palpitations, claudication, leg swelling and PND.   Gastrointestinal: Negative for abdominal pain and nausea.   Musculoskeletal: Negative for falls and  joint pain.   Skin: Negative for rash.   Neurological: Negative for dizziness, focal weakness and weakness.   Endo/Heme/Allergies: Does not bruise/bleed easily.     He does now report nocturia       Objective:   /59   Pulse 98   Resp 12   Ht 1.829 m (6')   Wt 71.7 kg (158 lb)   SpO2 92%   BMI 21.43 kg/m²     Physical Exam   Constitutional: No distress.   HENT:   Mouth/Throat: Oropharynx is clear and moist. No oropharyngeal exudate.   Eyes: No scleral icterus.   Neck: No JVD present.   Cardiovascular: Normal rate and normal heart sounds.  Exam reveals no gallop and no friction rub.    No murmur heard.  Pulmonary/Chest: No respiratory distress. He has no wheezes. He has no rales.   Abdominal: Soft. Bowel sounds are normal.   Musculoskeletal: He exhibits no edema.   Neurological: He is alert.   Skin: No rash noted. He is not diaphoretic.   Psychiatric: He has a normal mood and affect.       Assessment:     1. Coronary artery disease due to lipid rich plaque     2. Dyslipidemia     3. Essential hypertension, benign     4. S/P CABG (coronary artery bypass graft)         Medical Decision Making:  Today's Assessment / Status / Plan:     It was my pleasure to meet with Mr. Marquez.    He is on proper medical therapy for his coronary disease status post CABG     tolerating hismedications well     no significant change in his chronic Shortness of breath with altitude    Blood pressure is well controlled labs from June reviewed    Stable chronic kidney disease GFR is 31 creatinine 2 HDL 65 LDL 36    I will see Mr. Marquez back in 1 year time and encouraged him to follow up with us over the phone or e-mail using my MyChart as issues arise.    It is my pleasure to participate in the care of Mr. Marquez.  Please do not hesitate to contact me with questions or concerns.    Ulices Mcintyre MD PhD FAC  Cardiologist [facility]

## 2018-08-01 ENCOUNTER — TELEPHONE (OUTPATIENT)
Dept: PULMONOLOGY | Facility: HOSPICE | Age: 83
End: 2018-08-01

## 2018-08-01 NOTE — TELEPHONE ENCOUNTER
Pt called to speak with you regarding his OPO. He understands you are out of the clinic until next week, so he requested you clll him then.

## 2018-08-09 ENCOUNTER — HOSPITAL ENCOUNTER (OUTPATIENT)
Dept: RADIOLOGY | Facility: MEDICAL CENTER | Age: 83
End: 2018-08-09
Attending: FAMILY MEDICINE
Payer: MEDICARE

## 2018-08-09 DIAGNOSIS — I77.89 ENLARGED AORTA (HCC): ICD-10-CM

## 2018-08-09 DIAGNOSIS — E04.1 NONTOXIC UNINODULAR GOITER: ICD-10-CM

## 2018-08-09 PROCEDURE — 76536 US EXAM OF HEAD AND NECK: CPT

## 2018-08-09 PROCEDURE — 76775 US EXAM ABDO BACK WALL LIM: CPT

## 2018-09-21 DIAGNOSIS — E03.9 HYPOTHYROIDISM, UNSPECIFIED TYPE: ICD-10-CM

## 2018-09-21 DIAGNOSIS — E11.29 TYPE 2 DIABETES MELLITUS WITH OTHER DIABETIC KIDNEY COMPLICATION, WITHOUT LONG-TERM CURRENT USE OF INSULIN (HCC): ICD-10-CM

## 2018-09-21 RX ORDER — LEVOTHYROXINE SODIUM 0.1 MG/1
100 TABLET ORAL
Qty: 90 TAB | Refills: 3 | Status: SHIPPED | OUTPATIENT
Start: 2018-09-21

## 2018-09-21 RX ORDER — GLIMEPIRIDE 1 MG/1
TABLET ORAL
Qty: 270 TAB | Refills: 3 | Status: ON HOLD | OUTPATIENT
Start: 2018-09-21 | End: 2019-08-15

## 2018-10-08 NOTE — PROGRESS NOTES
Endocrinology Clinic Progress Note  PCP: Violette Huntley M.D.    HPI:  Juanito Marquez is a 83 y.o. old patient who comes in today for routine follow up of Management of Uncontrolled Type 2 Diabetes, Hypothyroidism, Vitamin D Deficiency, and Hypertension.  He is feeling good.    HPI:  Juanito Marquez is a 83 y.o. old patient who comes in today for evaluation of above stated problem.    Most Recent HbA1c:   Lab Results   Component Value Date/Time    HBA1C 6.6 10/09/2018 10:43 AM    HBA1C on 6/29/17 was 6.8    Current Diabetes Regimen:  GLP-1 Agent: Exenatide once weekly (2mg)   SGLT-2 Inhibitor:  Empagliflozin 10 mg once daily   Other: Glimepiride 2 mg in the morning and 1 mg in the evening.  Testing blood sugars     Before Breakfast: 110-140    Hypoglycemia:  None    ROS:  Constitutional: No weight loss currently.  He has lost around 45 pounds since starting Bydureon and Jardiance.  Cardiac: No palpitations or racing heart  Resp: No shortness of breath  Neuro: No numbness or tinging in feet  Endo: No heat or cold intolerance, no polyuria or polydipsia  All other systems were reviewed and were negative.    Past Medical History:  Patient Active Problem List    Diagnosis Date Noted   • Type 2 diabetes mellitus, controlled ([facility]) 10/14/2015   • Abnormal thyroid exam 06/30/2014   • Keratosis, actinic 06/03/2014   • S/P CABG (coronary artery bypass graft) 06/27/2013   • Hypothyroidism 07/19/2012   • Renal insufficiency 08/08/2011   • Dyslipidemia    • Essential hypertension, benign    • Coronary artery disease due to lipid rich plaque    • Back pain 06/23/2009       Past Surgical History:  Past Surgical History:   Procedure Laterality Date   • CHOLECYSTECTOMY     • COLECTOMY      FOR BENIGN ADENOMA   • MULTIPLE CORONARY ARTERY BYPASS     • OTHER      LOWER BACK SURGERY.       Allergies:  Patient has no known allergies.    Social History:  Social History     Social History   • Marital status:      Spouse name:  N/A   • Number of children: N/A   • Years of education: N/A     Occupational History   • Not on file.     Social History Main Topics   • Smoking status: Never Smoker   • Smokeless tobacco: Never Used   • Alcohol use 2.4 oz/week     4 Glasses of wine per week   • Drug use: No   • Sexual activity: Not Currently     Other Topics Concern   • Not on file     Social History Narrative   • No narrative on file       Family History:  Family History   Problem Relation Age of Onset   • Cancer Mother         breast   • Heart Disease Mother    • Diabetes Father    • Cancer Father         bladder   • Other Father         ruptured appendix   • Diabetes Paternal Grandfather        Medications:    Current Outpatient Prescriptions:   •  Empagliflozin 10 MG Tab, Take 1 tablet by mouth every morning with breakfast., Disp: 90 Tab, Rfl: 3  •  glimepiride (AMARYL) 1 MG tablet, 2 mg in the morning and 1 mg before dinner (Patient taking differently: Take 1 mg by mouth 2 times a day.), Disp: 270 Tab, Rfl: 3  •  Exenatide ER (BYDUREON) 2 MG Pen-injector, Inject 2 mg as instructed every 7 days., Disp: 12 Each, Rfl: 3  •  levothyroxine (SYNTHROID) 100 MCG Tab, Take 1 Tab by mouth Every morning on an empty stomach., Disp: 90 Tab, Rfl: 3  •  NIACINAMIDE PO, Take 50 mg by mouth 2 Times a Day., Disp: , Rfl:   •  rosuvastatin (CRESTOR) 10 MG Tab, Take 1 Tab by mouth every evening., Disp: 90 Tab, Rfl: 3  •  olmesartan (BENICAR) 40 MG Tab, Take 1 Tab by mouth every day. (Patient taking differently: Take 20 mg by mouth every day.), Disp: 90 Tab, Rfl: 3  •  Insulin Pen Needle (B-D UF III MINI PEN NEEDLES) 31G X 5 MM MISC, 1 Each by Does not apply route every day., Disp: 100 Each, Rfl: 9  •  ascorbic acid (ASCORBIC ACID) 500 MG TABS, Take 1,000 mg by mouth every day., Disp: , Rfl:   •  Coenzyme Q10 (CO Q 10) 10 MG CAPS, Take  by mouth.  , Disp: , Rfl:   •  Omega-3 Fatty Acids (FISH OIL) 1200 MG CAPS, Take 3 Caps by mouth every day., Disp: , Rfl:   •   Cholecalciferol (VITAMIN D) 2000 UNIT TABS, Take 1 Tab by mouth every day., Disp: , Rfl:   •  aspirin 81 MG tablet, Take 81 mg by mouth every day., Disp: , Rfl:   •  fluorouracil (EFUDEX) 5 % cream, , Disp: , Rfl:     Labs: Reviewed    Physical Examination:  Vital signs: /56   Pulse 70   Ht 1.829 m (6')   Wt 71.2 kg (157 lb)   SpO2 98%   BMI 21.29 kg/m²  Body mass index is 21.29 kg/m².  General: No apparent distress, cooperative  Eyes: No scleral icterus or discharge  ENMT: Normal on external inspection of nose, lips, normal thyroid exam  Neck: No abnormal masses on inspection  Resp: Normal effort, clear to auscultation bilaterally   CVS: Regular rate and rhythm, S1 S2 normal, no murmur   Extremities: No edema  Abdomen: abdominal obesity present  Neuro: Alert and oriented  Skin: No rash  Psych: Normal mood and affect, intact memory and able to make informed decisions    Foot Exam:  Monofilament: done  Monofilament testing with a 10 gram force: sensation intact: intact bilaterally  Visual Inspection: Feet without maceration, ulcers, fissures.  Assessment and Plan:    1. Uncontrolled type 2 diabetes mellitus with diabetic nephropathy, without long-term current use of insulin (HCC)  Continue current regimen.    The following was reviewed by Taty Hill RN CDE  - Discussed diabetic diet discussed in detail-plate method.  - He will test before meals and log .  - He will walk for 20-30 minutes daily.  - Reviewed medications and advised how to take   - Discussed importance of immunizations and yearly eye exams. Saw Dr. Zamudio this summer.  - Advised daily foot exams. Educated on signs of infection.   - Educational material distributed.   - Educated on need to stay well hydrated with water.  - Educated to call with any questions or problems.    2. Hypothyroidism, unspecified type  Controlled.    3. Vitamin D deficiency  Stable.     4. Essential hypertension, benign  Controlled.     Return in about 3 months  (around 1/9/2019).    Thank you for allowing me to participate in the care of this patient.    Dr. Darrion Mcbride  10/08/18    CC:   Violette Huntley M.D.    This note was created using voice recognition software (Dragon). The accuracy of the dictation is limited by the abilities of the software. I have reviewed the note prior to signing, however some errors in grammar and context are still possible. If you have any questions related to this note please do not hesitate to contact our office.

## 2018-10-09 ENCOUNTER — OFFICE VISIT (OUTPATIENT)
Dept: ENDOCRINOLOGY | Facility: MEDICAL CENTER | Age: 83
End: 2018-10-09
Payer: MEDICARE

## 2018-10-09 VITALS
BODY MASS INDEX: 21.26 KG/M2 | HEIGHT: 72 IN | WEIGHT: 157 LBS | DIASTOLIC BLOOD PRESSURE: 56 MMHG | OXYGEN SATURATION: 98 % | SYSTOLIC BLOOD PRESSURE: 110 MMHG | HEART RATE: 70 BPM

## 2018-10-09 DIAGNOSIS — E55.9 VITAMIN D DEFICIENCY: ICD-10-CM

## 2018-10-09 DIAGNOSIS — E03.9 HYPOTHYROIDISM, UNSPECIFIED TYPE: ICD-10-CM

## 2018-10-09 DIAGNOSIS — I10 ESSENTIAL HYPERTENSION, BENIGN: ICD-10-CM

## 2018-10-09 LAB
HBA1C MFR BLD: 6.6 % (ref ?–5.8)
INT CON NEG: NORMAL
INT CON POS: NORMAL

## 2018-10-09 PROCEDURE — 83036 HEMOGLOBIN GLYCOSYLATED A1C: CPT | Performed by: INTERNAL MEDICINE

## 2018-10-09 PROCEDURE — 99214 OFFICE O/P EST MOD 30 MIN: CPT | Performed by: INTERNAL MEDICINE

## 2018-10-09 NOTE — LETTER
[facility]  Virginia Morales M.D.  [address]  [facility] [address]  Fax: [phone]   Authorization for Release/Disclosure of   Protected Health Information   Name: PRABHAKAR ABDUL : 1935 SSN: [id number]   Address: [address] [place] [address]  [address] [place] [id number] Phone:    [phone] (home) [phone] (work)   I authorize the entity listed below to release/disclose the PHI below to:   [facility]/Virginia Morales M.D. and Darrion Mcbride M.D.   Provider or Entity Name:  Dr. Hurst   Address   City, [facility]   Phone:      Fax:     Reason for request: continuity of care   Information to be released:    [  ] LAST COLONOSCOPY,  including any PATH REPORT and follow-up  [  ] LAST FIT/COLOGUARD RESULT [  ] LAST DEXA  [  ] LAST MAMMOGRAM  [  ] LAST PAP  [  ] LAST LABS [x  ] RETINA EXAM REPORT  [  ] IMMUNIZATION RECORDS  [  ] Release all info      [  ] Check here and initial the line next to each item to release ALL health information INCLUDING  _____ Care and treatment for drug and / or alcohol abuse  _____ HIV testing, infection status, or AIDS  _____ Genetic Testing    DATES OF SERVICE OR TIME PERIOD TO BE DISCLOSED: _____________  I understand and acknowledge that:  * This Authorization may be revoked at any time by you in writing, except if your health information has already been used or disclosed.  * Your health information that will be used or disclosed as a result of you signing this authorization could be re-disclosed by the recipient. If this occurs, your re-disclosed health information may no longer be protected by State or Federal laws.  * You may refuse to sign this Authorization. Your refusal will not affect your ability to obtain treatment.  * This Authorization becomes effective upon signing and will  on (date) __________.      If no date is indicated, this Authorization will  one (1) year from the signature date.    Name: Prabhakar Cruz  [place]    Signature:   Date:     10/9/2018       PLEASE FAX REQUESTED RECORDS BACK TO: [phone]

## 2018-10-12 LAB
ALBUMIN/CREAT UR: 4.9 MG/G CREAT (ref 0–30)
BUN SERPL-MCNC: 30 MG/DL (ref 8–27)
BUN/CREAT SERPL: 17 (ref 10–24)
CALCIUM SERPL-MCNC: 9.3 MG/DL (ref 8.6–10.2)
CHLORIDE SERPL-SCNC: 110 MMOL/L (ref 96–106)
CO2 SERPL-SCNC: 21 MMOL/L (ref 20–29)
CREAT SERPL-MCNC: 1.78 MG/DL (ref 0.76–1.27)
CREAT UR-MCNC: 113.4 MG/DL
GLUCOSE SERPL-MCNC: 137 MG/DL (ref 65–99)
IF AFRICAN AMERICAN  100797: 40 ML/MIN/1.73
IF NON AFRICAN AMER 100791: 35 ML/MIN/1.73
MICROALBUMIN UR-MCNC: 5.6 UG/ML
POTASSIUM SERPL-SCNC: 4.4 MMOL/L (ref 3.5–5.2)
SODIUM SERPL-SCNC: 144 MMOL/L (ref 134–144)
T3 SERPL-MCNC: 121 NG/DL (ref 71–180)
T3FREE SERPL-MCNC: 3.4 PG/ML (ref 2–4.4)
T4 FREE SERPL-MCNC: 2.12 NG/DL (ref 0.82–1.77)
TSH SERPL DL<=0.005 MIU/L-ACNC: 0.01 UIU/ML (ref 0.45–4.5)

## 2018-10-15 ENCOUNTER — TELEPHONE (OUTPATIENT)
Dept: ENDOCRINOLOGY | Facility: MEDICAL CENTER | Age: 83
End: 2018-10-15

## 2018-11-01 ENCOUNTER — APPOINTMENT (OUTPATIENT)
Age: 83
Setting detail: DERMATOLOGY
End: 2018-11-01

## 2018-11-01 DIAGNOSIS — Z71.89 OTHER SPECIFIED COUNSELING: ICD-10-CM

## 2018-11-01 DIAGNOSIS — L82.1 OTHER SEBORRHEIC KERATOSIS: ICD-10-CM

## 2018-11-01 DIAGNOSIS — L57.8 OTHER SKIN CHANGES DUE TO CHRONIC EXPOSURE TO NONIONIZING RADIATION: ICD-10-CM

## 2018-11-01 DIAGNOSIS — Z86.007 PERSONAL HISTORY OF IN-SITU NEOPLASM OF SKIN: ICD-10-CM

## 2018-11-01 DIAGNOSIS — Z85.828 PERSONAL HISTORY OF OTHER MALIGNANT NEOPLASM OF SKIN: ICD-10-CM

## 2018-11-01 DIAGNOSIS — Z86.006 PERSONAL HISTORY OF MELANOMA IN-SITU: ICD-10-CM

## 2018-11-01 DIAGNOSIS — D18.0 HEMANGIOMA: ICD-10-CM

## 2018-11-01 DIAGNOSIS — L57.0 ACTINIC KERATOSIS: ICD-10-CM

## 2018-11-01 DIAGNOSIS — D22 MELANOCYTIC NEVI: ICD-10-CM

## 2018-11-01 PROBLEM — D22.5 MELANOCYTIC NEVI OF TRUNK: Status: ACTIVE | Noted: 2018-11-01

## 2018-11-01 PROBLEM — D18.01 HEMANGIOMA OF SKIN AND SUBCUTANEOUS TISSUE: Status: ACTIVE | Noted: 2018-11-01

## 2018-11-01 PROBLEM — Z85.820 PERSONAL HISTORY OF MALIGNANT MELANOMA OF SKIN: Status: ACTIVE | Noted: 2018-11-01

## 2018-11-01 PROCEDURE — 17003 DESTRUCT PREMALG LES 2-14: CPT

## 2018-11-01 PROCEDURE — OTHER COUNSELING: OTHER

## 2018-11-01 PROCEDURE — 99213 OFFICE O/P EST LOW 20 MIN: CPT | Mod: 25

## 2018-11-01 PROCEDURE — 17000 DESTRUCT PREMALG LESION: CPT

## 2018-11-01 PROCEDURE — OTHER LIQUID NITROGEN: OTHER

## 2018-11-01 ASSESSMENT — LOCATION DETAILED DESCRIPTION DERM
LOCATION DETAILED: LEFT INFERIOR MEDIAL UPPER BACK
LOCATION DETAILED: STERNUM
LOCATION DETAILED: LEFT MEDIAL UPPER BACK
LOCATION DETAILED: RIGHT PROXIMAL DORSAL FOREARM
LOCATION DETAILED: GLABELLA
LOCATION DETAILED: RIGHT CENTRAL POSTAURICULAR SKIN
LOCATION DETAILED: LEFT PROXIMAL PRETIBIAL REGION
LOCATION DETAILED: LEFT MID-UPPER BACK
LOCATION DETAILED: LEFT DISTAL DORSAL FOREARM
LOCATION DETAILED: RIGHT INFERIOR PREAURICULAR CHEEK
LOCATION DETAILED: LEFT PROXIMAL DORSAL FOREARM
LOCATION DETAILED: RIGHT PROXIMAL PRETIBIAL REGION
LOCATION DETAILED: LEFT ULNAR DORSAL HAND
LOCATION DETAILED: LEFT INFERIOR LATERAL MALAR CHEEK
LOCATION DETAILED: RIGHT RADIAL DORSAL HAND
LOCATION DETAILED: EPIGASTRIC SKIN
LOCATION DETAILED: RIGHT CENTRAL MALAR CHEEK
LOCATION DETAILED: RIGHT MEDIAL PROXIMAL PRETIBIAL REGION
LOCATION DETAILED: RIGHT SUPERIOR LATERAL LOWER BACK

## 2018-11-01 ASSESSMENT — LOCATION SIMPLE DESCRIPTION DERM
LOCATION SIMPLE: LEFT UPPER BACK
LOCATION SIMPLE: RIGHT HAND
LOCATION SIMPLE: LEFT HAND
LOCATION SIMPLE: RIGHT FOREARM
LOCATION SIMPLE: LEFT FOREARM
LOCATION SIMPLE: GLABELLA
LOCATION SIMPLE: SCALP
LOCATION SIMPLE: RIGHT LOWER BACK
LOCATION SIMPLE: CHEST
LOCATION SIMPLE: RIGHT PRETIBIAL REGION
LOCATION SIMPLE: RIGHT CHEEK
LOCATION SIMPLE: LEFT CHEEK
LOCATION SIMPLE: ABDOMEN
LOCATION SIMPLE: LEFT PRETIBIAL REGION

## 2018-11-01 ASSESSMENT — LOCATION ZONE DERM
LOCATION ZONE: FACE
LOCATION ZONE: TRUNK
LOCATION ZONE: ARM
LOCATION ZONE: LEG
LOCATION ZONE: SCALP
LOCATION ZONE: HAND

## 2018-11-01 NOTE — PROCEDURE: COUNSELING
Detail Level: Generalized
Detail Level: Detailed
Quality 224: Stage 0-Iic Melanoma: Overutilization Of Imaging Studies For Only Stage 0-Iic Melanoma: None of the following diagnostic imaging studies ordered: chest X-ray, CT, Ultrasound, MRI, PET, or nuclear medicine scans (ML)
Detail Level: Zone
Quality 137: Melanoma: Continuity Of Care - Recall System: Patient information entered into a recall system that includes: target date for the next exam specified AND a process to follow up with patients regarding missed or unscheduled appointments

## 2018-11-01 NOTE — PROCEDURE: LIQUID NITROGEN
Render Post-Care Instructions In Note?: no
Detail Level: Simple
Post-Care Instructions: I reviewed with the patient in detail post-care instructions. Patient is to wear sunprotection, and avoid picking at any of the treated lesions. Pt may apply Vaseline to crusted or scabbing areas.  Written consent was obtained.
Consent: The patient's written consent was obtained including but not limited to risks of crusting, scabbing, blistering, scarring, darker or lighter pigmentary change, recurrence, incomplete removal and infection.
Duration Of Freeze Thaw-Cycle (Seconds): 5
Number Of Freeze-Thaw Cycles: 2 freeze-thaw cycles

## 2019-02-15 LAB
ALBUMIN/CREAT UR: 6.5 MG/G CREAT (ref 0–30)
BUN SERPL-MCNC: 24 MG/DL (ref 8–27)
BUN/CREAT SERPL: 14 (ref 10–24)
CALCIUM SERPL-MCNC: 9.7 MG/DL (ref 8.6–10.2)
CHLORIDE SERPL-SCNC: 107 MMOL/L (ref 96–106)
CO2 SERPL-SCNC: 22 MMOL/L (ref 20–29)
CREAT SERPL-MCNC: 1.75 MG/DL (ref 0.76–1.27)
CREAT UR-MCNC: 116.7 MG/DL
FRUCTOSAMINE SERPL-SCNC: 280 UMOL/L (ref 0–285)
GLUCOSE SERPL-MCNC: 125 MG/DL (ref 65–99)
HBA1C MFR BLD: 6.4 % (ref 4.8–5.6)
MICROALBUMIN UR-MCNC: 7.6 UG/ML
POTASSIUM SERPL-SCNC: 4.9 MMOL/L (ref 3.5–5.2)
SODIUM SERPL-SCNC: 143 MMOL/L (ref 134–144)
T3 SERPL-MCNC: 101 NG/DL (ref 71–180)
T3FREE SERPL-MCNC: 3 PG/ML (ref 2–4.4)
T4 FREE SERPL-MCNC: 1.86 NG/DL (ref 0.82–1.77)
TSH SERPL DL<=0.005 MIU/L-ACNC: 0.02 UIU/ML (ref 0.45–4.5)

## 2019-05-02 ENCOUNTER — APPOINTMENT (OUTPATIENT)
Age: 84
Setting detail: DERMATOLOGY
End: 2019-05-02

## 2019-05-02 DIAGNOSIS — Z71.89 OTHER SPECIFIED COUNSELING: ICD-10-CM

## 2019-05-02 DIAGNOSIS — Z86.007 PERSONAL HISTORY OF IN-SITU NEOPLASM OF SKIN: ICD-10-CM

## 2019-05-02 DIAGNOSIS — Z86.006 PERSONAL HISTORY OF MELANOMA IN-SITU: ICD-10-CM

## 2019-05-02 DIAGNOSIS — D18.0 HEMANGIOMA: ICD-10-CM

## 2019-05-02 DIAGNOSIS — Z85.828 PERSONAL HISTORY OF OTHER MALIGNANT NEOPLASM OF SKIN: ICD-10-CM

## 2019-05-02 DIAGNOSIS — D485 NEOPLASM OF UNCERTAIN BEHAVIOR OF SKIN: ICD-10-CM

## 2019-05-02 DIAGNOSIS — L57.0 ACTINIC KERATOSIS: ICD-10-CM

## 2019-05-02 DIAGNOSIS — L57.8 OTHER SKIN CHANGES DUE TO CHRONIC EXPOSURE TO NONIONIZING RADIATION: ICD-10-CM

## 2019-05-02 DIAGNOSIS — L82.1 OTHER SEBORRHEIC KERATOSIS: ICD-10-CM

## 2019-05-02 PROBLEM — D48.5 NEOPLASM OF UNCERTAIN BEHAVIOR OF SKIN: Status: ACTIVE | Noted: 2019-05-02

## 2019-05-02 PROBLEM — Z85.820 PERSONAL HISTORY OF MALIGNANT MELANOMA OF SKIN: Status: ACTIVE | Noted: 2019-05-02

## 2019-05-02 PROBLEM — D18.01 HEMANGIOMA OF SKIN AND SUBCUTANEOUS TISSUE: Status: ACTIVE | Noted: 2019-05-02

## 2019-05-02 PROCEDURE — OTHER MIPS QUALITY: OTHER

## 2019-05-02 PROCEDURE — OTHER BIOPSY BY SHAVE METHOD: OTHER

## 2019-05-02 PROCEDURE — OTHER COUNSELING: OTHER

## 2019-05-02 PROCEDURE — OTHER LIQUID NITROGEN: OTHER

## 2019-05-02 PROCEDURE — OTHER TREATMENT REGIMEN: OTHER

## 2019-05-02 PROCEDURE — 99213 OFFICE O/P EST LOW 20 MIN: CPT | Mod: 25

## 2019-05-02 PROCEDURE — 11102 TANGNTL BX SKIN SINGLE LES: CPT

## 2019-05-02 PROCEDURE — 17000 DESTRUCT PREMALG LESION: CPT | Mod: 59

## 2019-05-02 PROCEDURE — OTHER OTHER: OTHER

## 2019-05-02 ASSESSMENT — LOCATION ZONE DERM
LOCATION ZONE: ARM
LOCATION ZONE: NOSE
LOCATION ZONE: FACE
LOCATION ZONE: TRUNK
LOCATION ZONE: LEG
LOCATION ZONE: SCALP

## 2019-05-02 ASSESSMENT — LOCATION DETAILED DESCRIPTION DERM
LOCATION DETAILED: LEFT PROXIMAL ULNAR DORSAL FOREARM
LOCATION DETAILED: LEFT INFERIOR LATERAL MALAR CHEEK
LOCATION DETAILED: RIGHT PROXIMAL PRETIBIAL REGION
LOCATION DETAILED: LEFT MEDIAL UPPER BACK
LOCATION DETAILED: RIGHT DISTAL DORSAL FOREARM
LOCATION DETAILED: LEFT PROXIMAL PRETIBIAL REGION
LOCATION DETAILED: EPIGASTRIC SKIN
LOCATION DETAILED: LEFT SUPERIOR FOREHEAD
LOCATION DETAILED: RIGHT MEDIAL PROXIMAL PRETIBIAL REGION
LOCATION DETAILED: RIGHT DISTAL PRETIBIAL REGION
LOCATION DETAILED: LEFT SUPERIOR LATERAL MALAR CHEEK
LOCATION DETAILED: RIGHT CENTRAL POSTAURICULAR SKIN
LOCATION DETAILED: RIGHT CENTRAL MALAR CHEEK
LOCATION DETAILED: LEFT INFERIOR MEDIAL UPPER BACK
LOCATION DETAILED: LEFT PROXIMAL DORSAL FOREARM
LOCATION DETAILED: RIGHT PROXIMAL DORSAL FOREARM
LOCATION DETAILED: LEFT INFERIOR MEDIAL FOREHEAD
LOCATION DETAILED: RIGHT SUPERIOR LATERAL LOWER BACK
LOCATION DETAILED: NASAL DORSUM

## 2019-05-02 ASSESSMENT — LOCATION SIMPLE DESCRIPTION DERM
LOCATION SIMPLE: RIGHT CHEEK
LOCATION SIMPLE: RIGHT FOREARM
LOCATION SIMPLE: RIGHT LOWER BACK
LOCATION SIMPLE: NOSE
LOCATION SIMPLE: LEFT FOREHEAD
LOCATION SIMPLE: ABDOMEN
LOCATION SIMPLE: LEFT FOREARM
LOCATION SIMPLE: RIGHT PRETIBIAL REGION
LOCATION SIMPLE: SCALP
LOCATION SIMPLE: LEFT PRETIBIAL REGION
LOCATION SIMPLE: LEFT UPPER BACK
LOCATION SIMPLE: LEFT CHEEK

## 2019-05-02 NOTE — PROCEDURE: MIPS QUALITY
Quality 402: Tobacco Use And Help With Quitting Among Adolescents: Patient screened for tobacco and never smoked
Quality 110: Preventive Care And Screening: Influenza Immunization: Influenza Immunization Administered during Influenza season
Detail Level: Zone
Quality 111:Pneumonia Vaccination Status For Older Adults: Pneumococcal Vaccination Previously Received

## 2019-05-02 NOTE — PROCEDURE: LIQUID NITROGEN
Post-Care Instructions: I reviewed with the patient in detail post-care instructions. Patient is to wear sunprotection, and avoid picking at any of the treated lesions. Pt may apply Vaseline to crusted or scabbing areas.  Written consent was obtained.
Consent: The patient's written consent was obtained including but not limited to risks of crusting, scabbing, blistering, scarring, darker or lighter pigmentary change, recurrence, incomplete removal and infection.
Detail Level: Simple
Number Of Freeze-Thaw Cycles: 2 freeze-thaw cycles
Render Post-Care Instructions In Note?: no
Duration Of Freeze Thaw-Cycle (Seconds): 5

## 2019-05-02 NOTE — PROCEDURE: BIOPSY BY SHAVE METHOD
Anesthesia Type: 1% lidocaine with epinephrine and a 1:10 solution of 8.4% sodium bicarbonate
Destruction After The Procedure: No
Render Post-Care Instructions In Note?: yes
Type Of Destruction Used: Electrodesiccation and Curettage
Silver Nitrate Text: The wound bed was treated with silver nitrate after the biopsy was performed.
Cryotherapy Text: The wound bed was treated with cryotherapy after the biopsy was performed.
Consent: Written consent was obtained and risks were reviewed including but not limited to scarring, infection, bleeding, scabbing, incomplete removal, nerve damage and allergy to anesthesia.
Dressing: bandage
Hemostasis: Drysol
Post-Care Instructions: I reviewed with the patient in detail post-care instructions. Patient is to keep the biopsy site dry overnight, and then apply vaseline twice daily until healed. Patient may apply hydrogen peroxide soaks to remove any crusting.
Size Of Lesion In Cm: 0
Biopsy Type: H and E
Notification Instructions: Patient will be notified of biopsy results. However, patient instructed to call the office if not contacted within 2 weeks.
Billing Type: Third-Party Bill
Detail Level: Detailed
Electrodesiccation Text: The wound bed was treated with electrodesiccation after the biopsy was performed.
Anesthesia Volume In Cc (Will Not Render If 0): 0.5
Electrodesiccation And Curettage Text: The wound bed was treated with electrodesiccation and curettage after the biopsy was performed.
Wound Care: Vaseline
Depth Of Biopsy: dermis
Biopsy Method: Acu-Razor

## 2019-05-02 NOTE — PROCEDURE: OTHER
Other (Free Text): pt has diffuse aks \\nhe has done efudex in the past but it has been many years\\nhe agrees to do it again, starting with neck and cheeks.  then to arms and hands
Note Text (......Xxx Chief Complaint.): This diagnosis correlates with the
Detail Level: Detailed

## 2019-05-13 ENCOUNTER — RX ONLY (RX ONLY)
Age: 84
End: 2019-05-13

## 2019-05-13 RX ORDER — FLUOROURACIL 5 MG/G
1 CREAM TOPICAL BID
Qty: 1 | Refills: 1 | COMMUNITY
Start: 2019-05-13

## 2019-06-02 NOTE — PROGRESS NOTES
Endocrinology Clinic Progress Note  PCP: Virginia Morales M.D.    HPI:  Juanito Marquez is a 83 y.o. old patient who comes in today for routine follow up of Management of Uncontrolled Type 2 Diabetes, Hypothyroidism, Hypertension, and Vitamin D Deficiency.  He states he is having skin cancer removed from his right leg later today.    Hypothyroidism:  Currently taking Levothyroxine 100 mcg daily.    Vitamin D Deficiency:  Currently taking Vitamin D 2000 units daily.    Hypertension:  Currently taking Benicar 40 mg daily.    HPI:  Juanito Marquez is a 83 y.o. old patient who comes in today for evaluation of above stated problem.    Most Recent HbA1c:   Lab Results   Component Value Date/Time    HBA1C 6.7 (A) 06/03/2019 10:11 AM        Current Diabetes Regimen:  GLP-1 Agent: Exenatide once weekly (2mg)   SGLT-2 Inhibitor:  Empagliflozin 10 mg once daily   Other: Glimepiride 1 mg BID.    Testing blood sugars in the morning    Before Breakfast:  120-130's    Hypoglycemia:  None    ROS:  Constitutional: No weight loss  Cardiac: No palpitations or racing heart  Resp: No shortness of breath  Neuro: No numbness or tinging in feet  Endo: No heat or cold intolerance, no polyuria or polydipsia  All other systems were reviewed and were negative.    Past Medical History:  Patient Active Problem List    Diagnosis Date Noted   • Type 2 diabetes mellitus, controlled ([facility]) 10/14/2015   • Abnormal thyroid exam 06/30/2014   • Keratosis, actinic 06/03/2014   • S/P CABG (coronary artery bypass graft) 06/27/2013   • Hypothyroidism 07/19/2012   • Renal insufficiency 08/08/2011   • Dyslipidemia    • Essential hypertension, benign    • Coronary artery disease due to lipid rich plaque    • Back pain 06/23/2009       Past Surgical History:  Past Surgical History:   Procedure Laterality Date   • CHOLECYSTECTOMY     • COLECTOMY      FOR BENIGN ADENOMA   • MULTIPLE CORONARY ARTERY BYPASS     • OTHER      LOWER BACK SURGERY.        Allergies:  Patient has no known allergies.    Social History:  Social History     Social History   • Marital status:      Spouse name: N/A   • Number of children: N/A   • Years of education: N/A     Occupational History   • Not on file.     Social History Main Topics   • Smoking status: Never Smoker   • Smokeless tobacco: Never Used   • Alcohol use 2.4 oz/week     4 Glasses of wine per week   • Drug use: No   • Sexual activity: Not Currently     Other Topics Concern   • Not on file     Social History Narrative   • No narrative on file       Family History:  Family History   Problem Relation Age of Onset   • Cancer Mother         breast   • Heart Disease Mother    • Diabetes Father    • Cancer Father         bladder   • Other Father         ruptured appendix   • Diabetes Paternal Grandfather        Medications:    Current Outpatient Prescriptions:   •  fluorouracil (EFUDEX) 5 % cream, Place on skin lesions as instructed, Disp: 1 Tube, Rfl: 3  •  Empagliflozin 10 MG Tab, Take 1 tablet by mouth every morning with breakfast., Disp: 90 Tab, Rfl: 3  •  glimepiride (AMARYL) 1 MG tablet, 2 mg in the morning and 1 mg before dinner (Patient taking differently: Take 1 mg by mouth 2 times a day.), Disp: 270 Tab, Rfl: 3  •  Exenatide ER (BYDUREON) 2 MG Pen-injector, Inject 2 mg as instructed every 7 days., Disp: 12 Each, Rfl: 3  •  levothyroxine (SYNTHROID) 100 MCG Tab, Take 1 Tab by mouth Every morning on an empty stomach., Disp: 90 Tab, Rfl: 3  •  NIACINAMIDE PO, Take 50 mg by mouth 2 Times a Day., Disp: , Rfl:   •  rosuvastatin (CRESTOR) 10 MG Tab, Take 1 Tab by mouth every evening., Disp: 90 Tab, Rfl: 3  •  olmesartan (BENICAR) 40 MG Tab, Take 1 Tab by mouth every day. (Patient taking differently: Take 20 mg by mouth every day.), Disp: 90 Tab, Rfl: 3  •  Insulin Pen Needle (B-D UF III MINI PEN NEEDLES) 31G X 5 MM MISC, 1 Each by Does not apply route every day., Disp: 100 Each, Rfl: 9  •  ascorbic acid (ASCORBIC  ACID) 500 MG TABS, Take 1,000 mg by mouth every day., Disp: , Rfl:   •  Coenzyme Q10 (CO Q 10) 10 MG CAPS, Take  by mouth.  , Disp: , Rfl:   •  Omega-3 Fatty Acids (FISH OIL) 1200 MG CAPS, Take 3 Caps by mouth every day., Disp: , Rfl:   •  Cholecalciferol (VITAMIN D) 2000 UNIT TABS, Take 1 Tab by mouth every day., Disp: , Rfl:   •  aspirin 81 MG tablet, Take 81 mg by mouth every day., Disp: , Rfl:     Labs: Reviewed    Physical Examination:  Vital signs: /60   Pulse 98   Ht 1.829 m (6')   Wt 72.6 kg (160 lb)   SpO2 96%   BMI 21.70 kg/m²  Body mass index is 21.7 kg/m².  General: No apparent distress, cooperative  Eyes: No scleral icterus or discharge  ENMT: Normal on external inspection of nose, lips, normal thyroid exam  Neck: No abnormal masses on inspection  Resp: Normal effort, clear to auscultation bilaterally   CVS: Regular rate and rhythm, S1 S2 normal, no murmur   Extremities: No edema  Abdomen: abdominal obesity present  Neuro: Alert and oriented  Skin: No rash  Psych: Normal mood and affect, intact memory and able to make informed decisions    Foot Exam:  Monofilament: done  Monofilament testing with a 10 gram force: sensation intact: intact bilaterally  Visual Inspection: Feet without maceration, ulcers, fissures.  Pedal pulses: intact bilaterally    Assessment and Plan:    1. Uncontrolled type 2 diabetes mellitus with diabetic nephropathy, without long-term current use of insulin (HCC)  Continue current regimen.  The following was reviewed  - Discussed diabetic diet discussed in detail-plate method.  - He will test before meals and log .  - He will walk for 20-30 minutes daily.  - Reviewed medications and advised how to take   - Discussed importance of immunizations and yearly eye exams. He saw Dr. Zamudio on 10/9/18.  - Advised daily foot exams. Educated on signs of infection.   - Educated on need to stay well hydrated with water.  - Educated to call with any questions or problems.    2.  Acquired hypothyroidism  Continue levothyroxine    3. Vitamin D deficiency  Continue vitamin D supplementation    4. Essential hypertension, benign  Controlled    Return in about 6 months (around 12/3/2019).    Thank you for allowing me to participate in the care of this patient.    Dr. Darrion Mcbride  This note was scribed by Taty Hill RN, CDE  06/03/19    CC:   Virginia Morales M.D.    This note was created using voice recognition software (Dragon). The accuracy of the dictation is limited by the abilities of the software. I have reviewed the note prior to signing, however some errors in grammar and context are still possible. If you have any questions related to this note please do not hesitate to contact our office.

## 2019-06-03 ENCOUNTER — OFFICE VISIT (OUTPATIENT)
Dept: ENDOCRINOLOGY | Facility: MEDICAL CENTER | Age: 84
End: 2019-06-03
Payer: MEDICARE

## 2019-06-03 VITALS
HEART RATE: 98 BPM | OXYGEN SATURATION: 96 % | HEIGHT: 72 IN | SYSTOLIC BLOOD PRESSURE: 108 MMHG | DIASTOLIC BLOOD PRESSURE: 60 MMHG | WEIGHT: 160 LBS | BODY MASS INDEX: 21.67 KG/M2

## 2019-06-03 DIAGNOSIS — E53.8 VITAMIN B 12 DEFICIENCY: ICD-10-CM

## 2019-06-03 DIAGNOSIS — L98.9 SKIN LESIONS: ICD-10-CM

## 2019-06-03 DIAGNOSIS — E03.9 ACQUIRED HYPOTHYROIDISM: ICD-10-CM

## 2019-06-03 DIAGNOSIS — I10 ESSENTIAL HYPERTENSION, BENIGN: ICD-10-CM

## 2019-06-03 DIAGNOSIS — E55.9 VITAMIN D DEFICIENCY: ICD-10-CM

## 2019-06-03 LAB
HBA1C MFR BLD: 6.7 % (ref 0–5.6)
INT CON NEG: ABNORMAL
INT CON POS: ABNORMAL

## 2019-06-03 PROCEDURE — 83036 HEMOGLOBIN GLYCOSYLATED A1C: CPT | Performed by: INTERNAL MEDICINE

## 2019-06-03 PROCEDURE — 99214 OFFICE O/P EST MOD 30 MIN: CPT | Performed by: INTERNAL MEDICINE

## 2019-06-03 RX ORDER — FLUOROURACIL 50 MG/G
CREAM TOPICAL
Qty: 1 TUBE | Refills: 3 | Status: SHIPPED | OUTPATIENT
Start: 2019-06-03 | End: 2019-08-13

## 2019-06-12 ENCOUNTER — OFFICE VISIT (OUTPATIENT)
Dept: CARDIOLOGY | Facility: MEDICAL CENTER | Age: 84
End: 2019-06-12
Payer: MEDICARE

## 2019-06-12 VITALS
BODY MASS INDEX: 21.67 KG/M2 | WEIGHT: 160 LBS | OXYGEN SATURATION: 95 % | SYSTOLIC BLOOD PRESSURE: 100 MMHG | HEIGHT: 72 IN | DIASTOLIC BLOOD PRESSURE: 58 MMHG | HEART RATE: 108 BPM

## 2019-06-12 DIAGNOSIS — I25.83 CORONARY ARTERY DISEASE DUE TO LIPID RICH PLAQUE: ICD-10-CM

## 2019-06-12 DIAGNOSIS — I20.0 UNSTABLE ANGINA (HCC): ICD-10-CM

## 2019-06-12 DIAGNOSIS — I25.10 CORONARY ARTERY DISEASE DUE TO LIPID RICH PLAQUE: ICD-10-CM

## 2019-06-12 DIAGNOSIS — Z95.1 S/P CABG (CORONARY ARTERY BYPASS GRAFT): ICD-10-CM

## 2019-06-12 DIAGNOSIS — I10 ESSENTIAL HYPERTENSION, BENIGN: ICD-10-CM

## 2019-06-12 PROCEDURE — 99214 OFFICE O/P EST MOD 30 MIN: CPT | Performed by: INTERNAL MEDICINE

## 2019-06-12 RX ORDER — OLMESARTAN MEDOXOMIL 20 MG/1
20 TABLET ORAL DAILY
Qty: 90 TAB | Refills: 3 | Status: CANCELLED | OUTPATIENT
Start: 2019-06-12

## 2019-06-12 ASSESSMENT — ENCOUNTER SYMPTOMS
DIZZINESS: 0
WEAKNESS: 0
FEVER: 0
CLAUDICATION: 0
BLURRED VISION: 0
SHORTNESS OF BREATH: 1
SORE THROAT: 0
NAUSEA: 0
PND: 0
COUGH: 0
CHILLS: 0
BRUISES/BLEEDS EASILY: 0
ABDOMINAL PAIN: 0
FOCAL WEAKNESS: 0
PALPITATIONS: 0
FALLS: 0

## 2019-06-12 NOTE — PROGRESS NOTES
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 who presents today for follow-up of his history of coronary disease status post CABG in 2004 in the setting of diabetes    He is been doing well [place] significant shortness of breath which reminds him of his symptoms prior to his bypass he is used to traveling back and forth from here in [place] last year he reportedly took longer to get used to the high-altitude but this time it significantly changed he last had a stress test about 5 years ago with a SPECT scan which had a small area of ischemia    Past Medical History:   Diagnosis Date   • Back pain 6/23/2009   • CAD (coronary artery disease)    • Chronic diarrhea 7/21/2008   • Colon polyp 2007   • DM (diabetes mellitus) (HCC)    • Hyperlipidemia    • Hypertension    • Keratosis, actinic 6/3/2014   • lumbar laminectomy 2010   • Overweight(278.02) 7/21/2008   • S/P right colectomy 2004    benign tubulovillous adenoma   • Sleep apnea    • status post CABG 1994   • Status post cholecystectomy 2004     Past Surgical History:   Procedure Laterality Date   • CHOLECYSTECTOMY     • COLECTOMY      FOR BENIGN ADENOMA   • MULTIPLE CORONARY ARTERY BYPASS     • OTHER      LOWER BACK SURGERY.     Family History   Problem Relation Age of Onset   • Cancer Mother         breast   • Heart Disease Mother    • Diabetes Father    • Cancer Father         bladder   • Other Father         ruptured appendix   • Diabetes Paternal Grandfather      Social History     Social History   • Marital status:      Spouse name: N/A   • Number of children: N/A   • Years of education: N/A     Occupational History   • Not on file.     Social History Main Topics   • Smoking status: Never Smoker   • Smokeless tobacco: Never Used   • Alcohol use 2.4 oz/week     4 Glasses of wine per week   • Drug use: No   • Sexual activity: Not  Currently     Other Topics Concern   • Not on file     Social History Narrative   • No narrative on file     No Known Allergies  Outpatient Encounter Prescriptions as of 6/12/2019   Medication Sig Dispense Refill   • fluorouracil (EFUDEX) 5 % cream Place on skin lesions as instructed 1 Tube 3   • Empagliflozin 10 MG Tab Take 1 tablet by mouth every morning with breakfast. 90 Tab 3   • glimepiride (AMARYL) 1 MG tablet 2 mg in the morning and 1 mg before dinner (Patient taking differently: Take 1 mg by mouth 2 times a day.) 270 Tab 3   • Exenatide ER (BYDUREON) 2 MG Pen-injector Inject 2 mg as instructed every 7 days. 12 Each 3   • levothyroxine (SYNTHROID) 100 MCG Tab Take 1 Tab by mouth Every morning on an empty stomach. 90 Tab 3   • NIACINAMIDE PO Take 50 mg by mouth 2 Times a Day.     • rosuvastatin (CRESTOR) 10 MG Tab Take 1 Tab by mouth every evening. 90 Tab 3   • olmesartan (BENICAR) 40 MG Tab Take 1 Tab by mouth every day. (Patient taking differently: Take 20 mg by mouth every day.) 90 Tab 3   • Insulin Pen Needle (B-D UF III MINI PEN NEEDLES) 31G X 5 MM MISC 1 Each by Does not apply route every day. 100 Each 9   • ascorbic acid (ASCORBIC ACID) 500 MG TABS Take 1,000 mg by mouth every day.     • Coenzyme Q10 (CO Q 10) 10 MG CAPS Take  by mouth.       • Omega-3 Fatty Acids (FISH OIL) 1200 MG CAPS Take 3 Caps by mouth every day.     • Cholecalciferol (VITAMIN D) 2000 UNIT TABS Take 1 Tab by mouth every day.     • aspirin 81 MG tablet Take 81 mg by mouth every day.       No facility-administered encounter medications on file as of 6/12/2019.      Review of Systems   Constitutional: Negative for chills and fever.   HENT: Negative for sore throat.    Eyes: Negative for blurred vision.   Respiratory: Positive for shortness of breath. Negative for cough.    Cardiovascular: Negative for chest pain, palpitations, claudication, leg swelling and PND.   Gastrointestinal: Negative for abdominal pain and nausea.    Musculoskeletal: Negative for falls and joint pain.   Skin: Negative for rash.   Neurological: Negative for dizziness, focal weakness and weakness.   Endo/Heme/Allergies: Does not bruise/bleed easily.        Objective:   /58 (BP Location: Left arm, Patient Position: Sitting, BP Cuff Size: Adult)   Pulse (!) 108   Ht 1.829 m (6')   Wt 72.6 kg (160 lb)   SpO2 95%   BMI 21.70 kg/m²     Physical Exam   Constitutional: No distress.   HENT:   Mouth/Throat: Oropharynx is clear and moist. No oropharyngeal exudate.   Eyes: No scleral icterus.   Neck: No JVD present.   Cardiovascular: Normal rate and normal heart sounds.  Exam reveals no gallop and no friction rub.    No murmur heard.  Pulmonary/Chest: No respiratory distress. He has no wheezes. He has no rales.   Abdominal: Soft. Bowel sounds are normal.   Musculoskeletal: He exhibits no edema.   Neurological: He is alert.   Skin: No rash noted. He is not diaphoretic.   Psychiatric: He has a normal mood and affect.     We reviewed his operative report and cath from 2004    We reviewed in person the most recent labs  Recent Results (from the past 4032 hour(s))   BASIC METABOLIC PANEL (8)    Collection Time: 02/13/19  9:19 AM   Result Value Ref Range    Glucose 125 (H) 65 - 99 mg/dL    Bun 24 8 - 27 mg/dL    Creatinine 1.75 (H) 0.76 - 1.27 mg/dL    GFR If Non  35 (L) >59 mL/min/1.73    GFR If  41 (L) >59 mL/min/1.73    Bun-Creatinine Ratio 14 10 - 24    Sodium 143 134 - 144 mmol/L    Potassium 4.9 3.5 - 5.2 mmol/L    Chloride 107 (H) 96 - 106 mmol/L    Co2 22 20 - 29 mmol/L    Calcium 9.7 8.6 - 10.2 mg/dL   MICROALB/CREAT RATIO RAND. UR    Collection Time: 02/13/19  9:19 AM   Result Value Ref Range    Creatinine, Random Urine 116.7 Not Estab. mg/dL    Microalbumin, Urine Random 7.6 Not Estab. ug/mL    Albumin / Creatinine Ratio 6.5 0.0 - 30.0 mg/g creat   TRIIODOTHYRONINE (TOTAL/FREE)    Collection Time: 02/13/19  9:19 AM   Result  Value Ref Range    T3 101 71 - 180 ng/dL    T3,Free 3.0 2.0 - 4.4 pg/mL   HEMOGLOBIN A1C    Collection Time: 02/13/19  9:19 AM   Result Value Ref Range    Glycohemoglobin 6.4 (H) 4.8 - 5.6 %   THYROXINE (T4)    Collection Time: 02/13/19  9:19 AM   Result Value Ref Range    Free T-4 1.86 (H) 0.82 - 1.77 ng/dL   TSH    Collection Time: 02/13/19  9:19 AM   Result Value Ref Range    TSH 0.020 (L) 0.450 - 4.500 uIU/mL   FRUCTOSAMINE    Collection Time: 02/13/19  9:19 AM   Result Value Ref Range    Fructosamine 280 0 - 285 umol/L   POCT  A1C    Collection Time: 06/03/19 10:11 AM   Result Value Ref Range    Glycohemoglobin 6.7 (A) 0.0 - 5.6 %    Internal Control Negative      Internal Control Positive         Assessment:     1. Coronary artery disease due to lipid rich plaque  NM-CARDIAC PET   2. Essential hypertension, benign     3. S/P CABG (coronary artery bypass graft)  NM-CARDIAC PET   4. Unstable angina (HCC)  NM-CARDIAC PET       Medical Decision Making:  Today's Assessment / Status / Plan:     It was my pleasure to meet with Mr. Marquez.    For his coronary disease and history of abnormal stress testing he had been doing okay on medical therapy but it may have progressed we discussed available work-up and PET scan is likely the most appropriate initial step if there is an area of moderate ischemia certainly could explore an angiogram but given his chronic kidney disease would like to avoid this if possible certainly could try some antianginals but his blood pressure is low on low-dose ARB which is likely important for his kidney health and diabetes    He is on appropriate statin.    We will follow up with Mr. Marquez on the results of the testing over the phone. We will determine further follow-up from there.    It is my pleasure to participate in the care of Mr. Marquez.  Please do not hesitate to contact me with questions or concerns.    Ulices Mcintyre MD PhD [facility]  Cardiologist [facility]    Please note that this dictation was created using voice recognition software. I have worked with consultants from the vendor as well as technical experts from [facility] to optimize the interface. I have made every reasonable attempt to correct obvious errors, but I expect that there are errors of grammar and possibly content I did not discover before finalizing the note.

## 2019-06-12 NOTE — LETTER
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 who presents today for follow-up of his history of coronary disease status post CABG in 2004 in the setting of diabetes    He is been doing well [place] shortness of breath which reminds him of his symptoms prior to his bypass he is used to traveling back and forth from here in [place] last year he reportedly took longer to get used to the [place]altitude but this time it significantly changed he last had a stress test about 5 years ago with a SPECT scan which had a small area of ischemia    Past Medical History:   Diagnosis Date   • Back pain 6/23/2009   • CAD (coronary artery disease)    • Chronic diarrhea 7/21/2008   • Colon polyp 2007   • DM (diabetes mellitus) (HCC)    • Hyperlipidemia    • Hypertension    • Keratosis, actinic 6/3/2014   • lumbar laminectomy 2010   • Overweight(278.02) 7/21/2008   • S/P right colectomy 2004    benign tubulovillous adenoma   • Sleep apnea    • status post CABG 1994   • Status post cholecystectomy 2004     Past Surgical History:   Procedure Laterality Date   • CHOLECYSTECTOMY     • COLECTOMY      FOR BENIGN ADENOMA   • MULTIPLE CORONARY ARTERY BYPASS     • OTHER      LOWER BACK SURGERY.     Family History   Problem Relation Age of Onset   • Cancer Mother         breast   • Heart Disease Mother    • Diabetes Father    • Cancer Father         bladder   • Other Father         ruptured appendix   • Diabetes Paternal Grandfather      Social History     Social History   • Marital status:      Spouse name: N/A   •  Number of children: N/A   • Years of education: N/A     Occupational History   • Not on file.     Social History Main Topics   • Smoking status: Never Smoker   • Smokeless tobacco: Never Used   • Alcohol use 2.4 oz/week     4 Glasses of wine per week   • Drug use: No   • Sexual activity: Not Currently     Other Topics Concern   • Not on file     Social History Narrative   • No narrative on file     No Known Allergies  Outpatient Encounter Prescriptions as of 6/12/2019   Medication Sig Dispense Refill   • fluorouracil (EFUDEX) 5 % cream Place on skin lesions as instructed 1 Tube 3   • Empagliflozin 10 MG Tab Take 1 tablet by mouth every morning with breakfast. 90 Tab 3   • glimepiride (AMARYL) 1 MG tablet 2 mg in the morning and 1 mg before dinner (Patient taking differently: Take 1 mg by mouth 2 times a day.) 270 Tab 3   • Exenatide ER (BYDUREON) 2 MG Pen-injector Inject 2 mg as instructed every 7 days. 12 Each 3   • levothyroxine (SYNTHROID) 100 MCG Tab Take 1 Tab by mouth Every morning on an empty stomach. 90 Tab 3   • NIACINAMIDE PO Take 50 mg by mouth 2 Times a Day.     • rosuvastatin (CRESTOR) 10 MG Tab Take 1 Tab by mouth every evening. 90 Tab 3   • olmesartan (BENICAR) 40 MG Tab Take 1 Tab by mouth every day. (Patient taking differently: Take 20 mg by mouth every day.) 90 Tab 3   • Insulin Pen Needle (B-D UF III MINI PEN NEEDLES) 31G X 5 MM MISC 1 Each by Does not apply route every day. 100 Each 9   • ascorbic acid (ASCORBIC ACID) 500 MG TABS Take 1,000 mg by mouth every day.     • Coenzyme Q10 (CO Q 10) 10 MG CAPS Take  by mouth.       • Omega-3 Fatty Acids (FISH OIL) 1200 MG CAPS Take 3 Caps by mouth every day.     • Cholecalciferol (VITAMIN D) 2000 UNIT TABS Take 1 Tab by mouth every day.     • aspirin 81 MG tablet Take 81 mg by mouth every day.       No facility-administered encounter medications on file as of 6/12/2019.      Review of Systems   Constitutional: Negative for chills and fever.   HENT:  Negative for sore throat.    Eyes: Negative for blurred vision.   Respiratory: Positive for shortness of breath. Negative for cough.    Cardiovascular: Negative for chest pain, palpitations, claudication, leg swelling and PND.   Gastrointestinal: Negative for abdominal pain and nausea.   Musculoskeletal: Negative for falls and joint pain.   Skin: Negative for rash.   Neurological: Negative for dizziness, focal weakness and weakness.   Endo/Heme/Allergies: Does not bruise/bleed easily.        Objective:   /58 (BP Location: Left arm, Patient Position: Sitting, BP Cuff Size: Adult)   Pulse (!) 108   Ht 1.829 m (6')   Wt 72.6 kg (160 lb)   SpO2 95%   BMI 21.70 kg/m²      Physical Exam   Constitutional: No distress.   HENT:   Mouth/Throat: Oropharynx is clear and moist. No oropharyngeal exudate.   Eyes: No scleral icterus.   Neck: No JVD present.   Cardiovascular: Normal rate and normal heart sounds.  Exam reveals no gallop and no friction rub.    No murmur heard.  Pulmonary/Chest: No respiratory distress. He has no wheezes. He has no rales.   Abdominal: Soft. Bowel sounds are normal.   Musculoskeletal: He exhibits no edema.   Neurological: He is alert.   Skin: No rash noted. He is not diaphoretic.   Psychiatric: He has a normal mood and affect.     We reviewed his operative report and cath from 2004    We reviewed in person the most recent labs  Recent Results (from the past 4032 hour(s))   BASIC METABOLIC PANEL (8)    Collection Time: 02/13/19  9:19 AM   Result Value Ref Range    Glucose 125 (H) 65 - 99 mg/dL    Bun 24 8 - 27 mg/dL    Creatinine 1.75 (H) 0.76 - 1.27 mg/dL    GFR If Non  35 (L) >59 mL/min/1.73    GFR If  41 (L) >59 mL/min/1.73    Bun-Creatinine Ratio 14 10 - 24    Sodium 143 134 - 144 mmol/L    Potassium 4.9 3.5 - 5.2 mmol/L    Chloride 107 (H) 96 - 106 mmol/L    Co2 22 20 - 29 mmol/L    Calcium 9.7 8.6 - 10.2 mg/dL   MICROALB/CREAT RATIO RAND. UR    Collection  Time: 02/13/19  9:19 AM   Result Value Ref Range    Creatinine, Random Urine 116.7 Not Estab. mg/dL    Microalbumin, Urine Random 7.6 Not Estab. ug/mL    Albumin / Creatinine Ratio 6.5 0.0 - 30.0 mg/g creat   TRIIODOTHYRONINE (TOTAL/FREE)    Collection Time: 02/13/19  9:19 AM   Result Value Ref Range    T3 101 71 - 180 ng/dL    T3,Free 3.0 2.0 - 4.4 pg/mL   HEMOGLOBIN A1C    Collection Time: 02/13/19  9:19 AM   Result Value Ref Range    Glycohemoglobin 6.4 (H) 4.8 - 5.6 %   THYROXINE (T4)    Collection Time: 02/13/19  9:19 AM   Result Value Ref Range    Free T-4 1.86 (H) 0.82 - 1.77 ng/dL   TSH    Collection Time: 02/13/19  9:19 AM   Result Value Ref Range    TSH 0.020 (L) 0.450 - 4.500 uIU/mL   FRUCTOSAMINE    Collection Time: 02/13/19  9:19 AM   Result Value Ref Range    Fructosamine 280 0 - 285 umol/L   POCT  A1C    Collection Time: 06/03/19 10:11 AM   Result Value Ref Range    Glycohemoglobin 6.7 (A) 0.0 - 5.6 %    Internal Control Negative      Internal Control Positive         Assessment:     1. Coronary artery disease due to lipid rich plaque  NM-CARDIAC PET   2. Essential hypertension, benign     3. S/P CABG (coronary artery bypass graft)  NM-CARDIAC PET   4. Unstable angina (HCC)  NM-CARDIAC PET       Medical Decision Making:  Today's Assessment / Status / Plan:     It was my pleasure to meet with Mr. Marquez.    For his coronary disease and history of abnormal stress testing he had been doing okay on medical therapy but it may have progressed we discussed available work-up and PET scan is likely the most appropriate initial step if there is an area of moderate ischemia certainly could explore an angiogram but given his chronic kidney disease would like to avoid this if possible certainly could try some antianginals but his blood pressure is low on low-dose ARB which is likely important for his kidney health and diabetes    He is on appropriate statin.    We will follow up with Mr. Marquez on the results of the  testing over the phone. We will determine further follow-up from there.    It is my pleasure to participate in the care of Mr. Marquez.  Please do not hesitate to contact me with questions or concerns.    Ulices Mcintyre MD PhD [facility]  Cardiologist [facility]    Please note that this dictation was created using voice recognition software. I have worked with consultants from the vendor as well as technical experts from [facility] to optimize the interface. I have made every reasonable attempt to correct obvious errors, but I expect that there are errors of grammar and possibly content I did not discover before finalizing the note.         Virginia Morales M.D.  [address] [address]  VIA Facsimile: [phone]

## 2019-06-21 ENCOUNTER — HOSPITAL ENCOUNTER (OUTPATIENT)
Dept: RADIOLOGY | Facility: MEDICAL CENTER | Age: 84
End: 2019-06-21
Attending: INTERNAL MEDICINE
Payer: MEDICARE

## 2019-06-21 DIAGNOSIS — I25.83 CORONARY ARTERY DISEASE DUE TO LIPID RICH PLAQUE: ICD-10-CM

## 2019-06-21 DIAGNOSIS — I25.10 CORONARY ARTERY DISEASE DUE TO LIPID RICH PLAQUE: ICD-10-CM

## 2019-06-21 DIAGNOSIS — I20.0 UNSTABLE ANGINA (HCC): ICD-10-CM

## 2019-06-21 DIAGNOSIS — Z95.1 S/P CABG (CORONARY ARTERY BYPASS GRAFT): ICD-10-CM

## 2019-06-21 PROCEDURE — 78492 MYOCRD IMG PET MLT RST&STRS: CPT | Mod: 26 | Performed by: INTERNAL MEDICINE

## 2019-06-21 PROCEDURE — 93018 CV STRESS TEST I&R ONLY: CPT | Performed by: INTERNAL MEDICINE

## 2019-06-21 PROCEDURE — A9555 RB82 RUBIDIUM: HCPCS

## 2019-06-21 NOTE — PROCEDURES
REFERRING PHYSICIAN:  Ulices Mcintyre MD    AGE:  83    GENDER:  Male     HEIGHT:  72 inches     WEIGHT:  160 pounds       BMI:      INDICATIONS:  Chest pain, coronary artery disease, history of coronary artery   bypass graft surgery.    MEDICATIONS:      PROCEDURE:  The patient reviewed and signed the acknowledgement for testing   form.  The patient was in a fasting state and was properly prepared for   testing.  An intravenous line was inserted and a flush of normal saline   followed to insure line patency.    A transmission scan was acquired for attenuation correction using the internal   Germanium sources.  The patient was then administered 20.0 mCi of   Rubidium-82.  Approximately 90 seconds after the infusion, resting imagine   were obtained with ECG-gating.  Following the resting series, the patient   administered 41.5 mg of dipyridamole over four minutes.  The blood pressure,   heart rate and ECG were monitored and recorded.  After the dipyridamole   infusion was completed, another transmission scan for attenuation correction   was obtained.  The patient was then administered 20.0 mCi of Rubidium-82.    Approximately 90 seconds after the infusion, Peak stress images were obtained   with ECG-gating.    CLINICAL RESPONSE:  Resting blood pressure was 90/50 mmHg with a heart rate of   81 beats per minute.  Immediately post-dipyridamole infusion the blood   pressure was 86/46 mmHg with a heart rate of 94 beats per minute.  After a   recovery period the blood pressure was 87/45 mmHg with a heart rate of 94   beats per minute.    The patient experienced headache, dizziness during testing.    Aminophylline 0 mg was administered following the scan.    ELECTROCARDIOGRAPHIC FINDINGS:  Rest EKG shows sinus rhythm with nonspecific   intraventricular conduction delay.  Stress EKG shows no significant ST segment   changes.    SCINTOGRAPHIC FINDINGS:  Rest images shows small attenuation of the distal   anterior wall  and the apex, which is enhanced on stress images consistent with   a small infarct with ischemia.    GATED WALL MOTION FINDINGS:  Resting ejection fraction 54%.  Stress ejection   fraction 76%.  No segmental wall motion abnormalities.    CONCLUSIONS AND IMPRESSIONS:  1.  Abnormal cardiac PET scan.        Small distal anterior, apical infarct with small liset-infarct ischemia.  2.  Normal left ventricular systolic function with resting ejection fraction 54%, stress ejection fraction of 76%.         No segmental wall motion abnormalities noted.  3.  No EKG changes.  4.  Chest pain was not experienced during this study.  5.  Additional information: Attempted to contact patient (06/21/19).       ____________________________________     MD PAM AHN / KEITH    DD:  06/21/2019 12:39:55  DT:  06/21/2019 14:12:25    D#:  [id number]  Job#:  [id number]

## 2019-06-22 NOTE — PROCEDURES
AGE:  83.   GENDER:  Male.   HEIGHT:  72 inches.   WEIGHT:  160 pounds.    INDICATION:  CAD, CABG, and chest pain.    PROCEDURE:  The patient reviewed and signed the acknowledgement for testing   form.  The patient was in a fasting state and was properly prepared for   testing.  An intravenous line was inserted and a flush of normal saline   followed to insure line patency.    A transmission scan was acquired for attenuation correction using the internal   Germanium sources.  The patient was then administered 20.0 mCi of   Rubidium-82.  Approximately 90 seconds after the infusion, resting imagine   were obtained with ECG-gating.  Following the resting series, the patient   administered 41.5 mg of dipyridamole over four minutes.  The blood pressure,   heart rate and ECG were monitored and recorded.  After the dipyridamole   infusion was completed, another transmission scan for attenuation correction   was obtained.  The patient was then administered 20 mCi of Rubidium-82.    Approximately 90 seconds after the infusion, Peak stress images were obtained   with ECG-gating.     CLINICAL RESPONSE:  Resting blood pressure was 90/50 with a heart rate of 80.    Immediately post-dipyridamole infusion the blood pressure was 86/46 with a   heart rate of 94.  After a recovery period the blood pressure was 87/45 with a   heart rate of 94.     The patient experienced headache and dizziness symptoms during testing.     Aminophylline 0 mg was administered following the scan.     ELECTROCARDIOGRAPHIC FINDINGS:  Showed normal sinus rhythm with a nonspecific   intraventricular conduction delay with no ischemic ST segment changes at rest   or stress.    SCINTOGRAPHIC FINDINGS:  Show a small area of ischemia on the mid anterior   wall with mild inducible ischemia in the distribution of the LAD.  Otherwise,   the remainder of the segments show normal homogeneous tracer uptake.    GATED WALL MOTION FINDINGS:  Show an ejection fraction of  64 with a stress EF   of 76.    CONCLUSION AND IMPRESSION:  Mildly positive stress test for ischemia with a   normal ejection fraction.       ____________________________________     MD RUBÉN HERNANDEZ / KEITH    DD:  06/22/2019 07:46:26  DT:  06/22/2019 08:59:34    D#:  [id number]  Job#:  [id number]

## 2019-06-24 ENCOUNTER — HOSPITAL ENCOUNTER (OUTPATIENT)
Dept: LAB | Facility: MEDICAL CENTER | Age: 84
End: 2019-06-24
Attending: FAMILY MEDICINE
Payer: MEDICARE

## 2019-06-24 DIAGNOSIS — Z01.812 PRE-OPERATIVE LABORATORY EXAMINATION: ICD-10-CM

## 2019-06-24 LAB
ANION GAP SERPL CALC-SCNC: 6 MMOL/L (ref 0–11.9)
BUN SERPL-MCNC: 34 MG/DL (ref 8–22)
CALCIUM SERPL-MCNC: 9.7 MG/DL (ref 8.5–10.5)
CHLORIDE SERPL-SCNC: 106 MMOL/L (ref 96–112)
CO2 SERPL-SCNC: 25 MMOL/L (ref 20–33)
CREAT SERPL-MCNC: 2.13 MG/DL (ref 0.5–1.4)
GLUCOSE SERPL-MCNC: 173 MG/DL (ref 65–99)
POTASSIUM SERPL-SCNC: 4.9 MMOL/L (ref 3.6–5.5)
PSA SERPL-MCNC: 0.9 NG/ML (ref 0–4)
SODIUM SERPL-SCNC: 137 MMOL/L (ref 135–145)

## 2019-06-24 PROCEDURE — 80048 BASIC METABOLIC PNL TOTAL CA: CPT

## 2019-06-24 PROCEDURE — 84153 ASSAY OF PSA TOTAL: CPT

## 2019-06-24 PROCEDURE — 36415 COLL VENOUS BLD VENIPUNCTURE: CPT

## 2019-06-24 RX ORDER — GLUCOSA SU 2KCL/CHONDROITIN SU 500-400 MG
2 CAPSULE ORAL 2 TIMES DAILY
COMMUNITY

## 2019-06-24 NOTE — OR NURSING
"  Note positive cardiology PET scan on 6/21/19, but no note regarding any clearance for surgery. Spoke with Taty at Dr Tobin's office and she states the only thing they received was a PET scan. Explained that was a cardiology PET. Taty states she will work on calling pt to get cardiology clearance. 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    Pre-admit appointment completed. \"Preparing for your procedure\" sheet given to pt along with verbal and written instructions. Pt instructed to continue regularly prescribed medications through the day before surgery. Pt instructed to take the following medications the day of surgery with a sip of water, per anesthesia protocol; synthroid.     Pt states he received a call from Dr Tobin and his procedure will be only local. Received call from Taty at Dr Tobin's office and she states procedure will be local with sedation only. Explain chart will go to Dr Roberts for any further recommendations. Pt informed of such. Pt states he also saw his PCP just recently too.     Pt to bring CPAP DOS.    Dr Roberts notified via e-mail due to recent cardiology PET results and other co-morbidities.                                                                                                                                "

## 2019-06-25 ENCOUNTER — TELEPHONE (OUTPATIENT)
Dept: CARDIOLOGY | Facility: MEDICAL CENTER | Age: 84
End: 2019-06-25

## 2019-06-25 NOTE — OR NURSING
The type of anesthesia in this case is irrelevant as ischemic changes were seen on the PET scan.  In the setting of an elective procedure, patient should follow up with cardiology and get proper clearance even in a low risk procedure and low risk anesthetic plan.  Thank you.   Amanda Roberts M.D.  [facility]  The above was Faxed to Dr. Tobin's office to get cardiology clearance.

## 2019-06-25 NOTE — OR NURSING
Called Dr Tobin's office to inform Dr Roberts's note faxed to office and pt will need cardiology clearance for surgery. Taty is off today, but office will have her call tomorrow, and front office gal will give note to Dr Tobin.

## 2019-06-25 NOTE — TELEPHONE ENCOUNTER
"Update relayed to MD.    -------------------------  RE: abnormal PET   Received: Today Message Contents   RAYNE Genao R.N.   Cc: Anais Senior R.N.     Please continue to treat medically.     Thanks.  VIDAL.      ----- Message -----   From: Pratima Galindo R.N.   Sent: 6/24/2019   9:33 AM   To: Anais Senior R.N., Anderson Acevedo M.D.   Subject: abnormal PET                                     Dr. Acevedo,   Dr. Mcintyre is on vacation all week. Could you please advise on treatment plan in the interim? Dr. Mcintyre's office visit note states:     \"if there is an area of moderate ischemia certainly could explore an angiogram but given his chronic kidney disease would like to avoid this if possible\"     Thank you   ----- Message -----   From: Anderson Acevedo M.D.   Sent: 6/24/2019   6:48 AM   To: Pratima Galindo R.N.     Please call ordering physician Ulices Alejandre with abnormal cardiac PET scan results showing small distal anterior and apical infarct and ischemia. 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 Received no contact.     Thanks.  VIDAL           "

## 2019-06-27 ENCOUNTER — TELEPHONE (OUTPATIENT)
Dept: CARDIOLOGY | Facility: MEDICAL CENTER | Age: 84
End: 2019-06-27

## 2019-06-27 NOTE — TELEPHONE ENCOUNTER
"Received fax from Taty COLE from [facility] - Dr. Tobin (P: [phone] F: [phone]) requesting cardiac clearance for upcoming procedure: Squamous Cell Removal on 07/02/2019.  They further state \"surgery is not general anesthesia.  It is local with sedation.\"    Clearance request relayed to MD for advise.  "

## 2019-06-27 NOTE — TELEPHONE ENCOUNTER
"Returned patient call and reviewed findings.  He verbalizes understanding and he is following up on status of Cardiac Clearance, see prior telephone note.  He is requesting for ADD-MD to contact patient regarding recommendations as he would like to be \"in the loop somehow.\"  Pt is requesting for direct line, explained to pt [phone] is the number to contact.  He states the choice of contact is [phone].  Reassurance given to pt that request will be relayed to ADD-MD for advise.    Pt request relayed to ADD-MD for advise.    -----------------------  PET DICTATED RESULTS   Order: [id number]   Status:  Final result   Visible to patient:  No (Inaccessible in MyChart)   Notes recorded by Ulices Mcintyre M.D. on 6/21/2019 at 2:23 PM PDT    Let him know his stress is the same as before so that is reassuring, reasonable to monitor symptoms and let us know if bothers enough for an angiogram but glad to see alternative modality shows the same result as before.    Thank you       pt requesting a call back   Received: Today Message Contents   Hilarylexis Campbell, Med Ass't  Anais Senior R.N.   Phone Number: [phone]     CW     Pt requesting a call back to speak with you. He didn't give me any information about the call, all he said was \"she knows.\"        "

## 2019-06-28 NOTE — TELEPHONE ENCOUNTER
"Clearance letter printed with MD recommendations.    Attempted to call pt home and mobile number, unable to reach.  Left voicemail on mobile number with MD recommendations and to return this call if he has any questions or concerns.    Called Taty COLE, [phone], and reviewed findings.  She states she sent the letter to the Anesthesiologist and was \"recommending to answer yes or no.\"  Offered to send MD Telephone note with her recommendations, she accepts.  She is requesting for telephone note to be fax to her, [phone].  Reassurance given that note will be faxed to her.  She verbalizes understanding and is appreciative of information given.    Telephone note fax to Taty, [phone], completed status.  "

## 2019-06-28 NOTE — TELEPHONE ENCOUNTER
I have completed a letter today to the best my ability as requested by Dr Barnett.    I recommend that you make Dr. Marquez aware of the letter and send him a copy.  He is a retired radiologist.  You could read it to him over the phone or fax it to him or text message a copy or email if he would like it sooner.  I fear that it will not reach him for the surgery or before the decision to hold aspirin has been made.    I was asked to render medical opinion on somewhat short notice. Please explained to Dr. Marquez that I was the physician on-call requested to write the letter but that I and working in the hospital all night and will be home tomorrow post call and unavailable.    If you would like to speak to a physician, make the ADD aware on Friday which would be Dr. Tucker Francisco. Dr. Marquez may have questions about holding aspirin.  It is my recommendation that they perform the procedure on aspirin.    Thank you,  MARGO Willson

## 2019-07-02 ENCOUNTER — HOSPITAL ENCOUNTER (OUTPATIENT)
Facility: MEDICAL CENTER | Age: 84
End: 2019-07-02
Attending: PLASTIC SURGERY | Admitting: PLASTIC SURGERY
Payer: MEDICARE

## 2019-07-02 ENCOUNTER — ANESTHESIA EVENT (OUTPATIENT)
Dept: SURGERY | Facility: MEDICAL CENTER | Age: 84
End: 2019-07-02
Payer: MEDICARE

## 2019-07-02 ENCOUNTER — ANESTHESIA (OUTPATIENT)
Dept: SURGERY | Facility: MEDICAL CENTER | Age: 84
End: 2019-07-02
Payer: MEDICARE

## 2019-07-02 VITALS
SYSTOLIC BLOOD PRESSURE: 91 MMHG | OXYGEN SATURATION: 100 % | BODY MASS INDEX: 20.28 KG/M2 | WEIGHT: 149.69 LBS | RESPIRATION RATE: 16 BRPM | HEART RATE: 103 BPM | TEMPERATURE: 96.8 F | DIASTOLIC BLOOD PRESSURE: 50 MMHG | HEIGHT: 72 IN

## 2019-07-02 LAB
GLUCOSE BLD-MCNC: 129 MG/DL (ref 65–99)
PATHOLOGY CONSULT NOTE: NORMAL

## 2019-07-02 PROCEDURE — 160048 HCHG OR STATISTICAL LEVEL 1-5: Performed by: PLASTIC SURGERY

## 2019-07-02 PROCEDURE — 88331 PATH CONSLTJ SURG 1 BLK 1SPC: CPT

## 2019-07-02 PROCEDURE — 82962 GLUCOSE BLOOD TEST: CPT

## 2019-07-02 PROCEDURE — A6402 STERILE GAUZE <= 16 SQ IN: HCPCS | Performed by: PLASTIC SURGERY

## 2019-07-02 PROCEDURE — 160041 HCHG SURGERY MINUTES - EA ADDL 1 MIN LEVEL 4: Performed by: PLASTIC SURGERY

## 2019-07-02 PROCEDURE — 160025 RECOVERY II MINUTES (STATS): Performed by: PLASTIC SURGERY

## 2019-07-02 PROCEDURE — 500881 HCHG PACK, EXTREMITY: Performed by: PLASTIC SURGERY

## 2019-07-02 PROCEDURE — 160029 HCHG SURGERY MINUTES - 1ST 30 MINS LEVEL 4: Performed by: PLASTIC SURGERY

## 2019-07-02 PROCEDURE — 700105 HCHG RX REV CODE 258: Performed by: ANESTHESIOLOGY

## 2019-07-02 PROCEDURE — 700102 HCHG RX REV CODE 250 W/ 637 OVERRIDE(OP): Performed by: ANESTHESIOLOGY

## 2019-07-02 PROCEDURE — 501445 HCHG STAPLER, SKIN DISP: Performed by: PLASTIC SURGERY

## 2019-07-02 PROCEDURE — A9270 NON-COVERED ITEM OR SERVICE: HCPCS | Performed by: ANESTHESIOLOGY

## 2019-07-02 PROCEDURE — 88305 TISSUE EXAM BY PATHOLOGIST: CPT

## 2019-07-02 PROCEDURE — 700101 HCHG RX REV CODE 250: Performed by: PLASTIC SURGERY

## 2019-07-02 PROCEDURE — 700105 HCHG RX REV CODE 258: Performed by: PLASTIC SURGERY

## 2019-07-02 PROCEDURE — 700111 HCHG RX REV CODE 636 W/ 250 OVERRIDE (IP): Performed by: ANESTHESIOLOGY

## 2019-07-02 PROCEDURE — 160046 HCHG PACU - 1ST 60 MINS PHASE II: Performed by: PLASTIC SURGERY

## 2019-07-02 PROCEDURE — 160036 HCHG PACU - EA ADDL 30 MINS PHASE I: Performed by: PLASTIC SURGERY

## 2019-07-02 PROCEDURE — 160035 HCHG PACU - 1ST 60 MINS PHASE I: Performed by: PLASTIC SURGERY

## 2019-07-02 PROCEDURE — 700101 HCHG RX REV CODE 250: Performed by: ANESTHESIOLOGY

## 2019-07-02 PROCEDURE — 160002 HCHG RECOVERY MINUTES (STAT): Performed by: PLASTIC SURGERY

## 2019-07-02 PROCEDURE — A6222 GAUZE <=16 IN NO W/SAL W/O B: HCPCS | Performed by: PLASTIC SURGERY

## 2019-07-02 PROCEDURE — 501838 HCHG SUTURE GENERAL: Performed by: PLASTIC SURGERY

## 2019-07-02 PROCEDURE — 160009 HCHG ANES TIME/MIN: Performed by: PLASTIC SURGERY

## 2019-07-02 RX ORDER — CEFAZOLIN SODIUM 1 G/3ML
INJECTION, POWDER, FOR SOLUTION INTRAMUSCULAR; INTRAVENOUS PRN
Status: DISCONTINUED | OUTPATIENT
Start: 2019-07-02 | End: 2019-07-02 | Stop reason: SURG

## 2019-07-02 RX ORDER — OXYCODONE HCL 5 MG/5 ML
5 SOLUTION, ORAL ORAL
Status: DISCONTINUED | OUTPATIENT
Start: 2019-07-02 | End: 2019-07-02 | Stop reason: HOSPADM

## 2019-07-02 RX ORDER — BUPIVACAINE HYDROCHLORIDE AND EPINEPHRINE 5; 5 MG/ML; UG/ML
INJECTION, SOLUTION PERINEURAL
Status: DISCONTINUED | OUTPATIENT
Start: 2019-07-02 | End: 2019-07-02 | Stop reason: HOSPADM

## 2019-07-02 RX ORDER — SODIUM CHLORIDE, SODIUM LACTATE, POTASSIUM CHLORIDE, CALCIUM CHLORIDE 600; 310; 30; 20 MG/100ML; MG/100ML; MG/100ML; MG/100ML
INJECTION, SOLUTION INTRAVENOUS CONTINUOUS
Status: DISCONTINUED | OUTPATIENT
Start: 2019-07-02 | End: 2019-07-02 | Stop reason: HOSPADM

## 2019-07-02 RX ORDER — ACETAMINOPHEN 500 MG
1000 TABLET ORAL ONCE
Status: COMPLETED | OUTPATIENT
Start: 2019-07-02 | End: 2019-07-02

## 2019-07-02 RX ORDER — PHENYLEPHRINE HYDROCHLORIDE 10 MG/ML
INJECTION, SOLUTION INTRAMUSCULAR; INTRAVENOUS; SUBCUTANEOUS PRN
Status: DISCONTINUED | OUTPATIENT
Start: 2019-07-02 | End: 2019-07-02 | Stop reason: SURG

## 2019-07-02 RX ORDER — SODIUM CHLORIDE, SODIUM LACTATE, POTASSIUM CHLORIDE, CALCIUM CHLORIDE 600; 310; 30; 20 MG/100ML; MG/100ML; MG/100ML; MG/100ML
INJECTION, SOLUTION INTRAVENOUS
Status: DISCONTINUED | OUTPATIENT
Start: 2019-07-02 | End: 2019-07-02 | Stop reason: SURG

## 2019-07-02 RX ORDER — ONDANSETRON 2 MG/ML
4 INJECTION INTRAMUSCULAR; INTRAVENOUS
Status: DISCONTINUED | OUTPATIENT
Start: 2019-07-02 | End: 2019-07-02 | Stop reason: HOSPADM

## 2019-07-02 RX ADMIN — SODIUM CHLORIDE, POTASSIUM CHLORIDE, SODIUM LACTATE AND CALCIUM CHLORIDE 1000 ML: 600; 310; 30; 20 INJECTION, SOLUTION INTRAVENOUS at 13:35

## 2019-07-02 RX ADMIN — ACETAMINOPHEN 1000 MG: 500 TABLET, FILM COATED ORAL at 10:45

## 2019-07-02 RX ADMIN — SODIUM CHLORIDE, POTASSIUM CHLORIDE, SODIUM LACTATE AND CALCIUM CHLORIDE: 600; 310; 30; 20 INJECTION, SOLUTION INTRAVENOUS at 11:00

## 2019-07-02 RX ADMIN — PROPOFOL 50 MCG/KG/MIN: 10 INJECTION, EMULSION INTRAVENOUS at 11:47

## 2019-07-02 RX ADMIN — PHENYLEPHRINE HYDROCHLORIDE 200 MCG: 10 INJECTION INTRAVENOUS at 12:12

## 2019-07-02 RX ADMIN — PHENYLEPHRINE HYDROCHLORIDE 200 MCG: 10 INJECTION INTRAVENOUS at 12:41

## 2019-07-02 RX ADMIN — MIDAZOLAM HYDROCHLORIDE 0.5 MG: 1 INJECTION, SOLUTION INTRAMUSCULAR; INTRAVENOUS at 11:48

## 2019-07-02 RX ADMIN — EPHEDRINE SULFATE 5 MG: 50 INJECTION INTRAMUSCULAR; INTRAVENOUS; SUBCUTANEOUS at 12:35

## 2019-07-02 RX ADMIN — CEFAZOLIN 2 G: 1 INJECTION, POWDER, FOR SOLUTION INTRAVENOUS at 11:47

## 2019-07-02 RX ADMIN — FENTANYL CITRATE 25 MCG: 50 INJECTION, SOLUTION INTRAMUSCULAR; INTRAVENOUS at 11:44

## 2019-07-02 RX ADMIN — PHENYLEPHRINE HYDROCHLORIDE 100 MCG: 10 INJECTION INTRAVENOUS at 12:14

## 2019-07-02 RX ADMIN — SODIUM CHLORIDE, POTASSIUM CHLORIDE, SODIUM LACTATE AND CALCIUM CHLORIDE: 600; 310; 30; 20 INJECTION, SOLUTION INTRAVENOUS at 11:44

## 2019-07-02 RX ADMIN — PHENYLEPHRINE HYDROCHLORIDE 200 MCG: 10 INJECTION INTRAVENOUS at 12:19

## 2019-07-02 RX ADMIN — PHENYLEPHRINE HYDROCHLORIDE 100 MCG: 10 INJECTION INTRAVENOUS at 12:03

## 2019-07-02 RX ADMIN — PHENYLEPHRINE HYDROCHLORIDE 200 MCG: 10 INJECTION INTRAVENOUS at 12:07

## 2019-07-02 RX ADMIN — PHENYLEPHRINE HYDROCHLORIDE 200 MCG: 10 INJECTION INTRAVENOUS at 12:24

## 2019-07-02 RX ADMIN — FENTANYL CITRATE 25 MCG: 50 INJECTION, SOLUTION INTRAMUSCULAR; INTRAVENOUS at 11:48

## 2019-07-02 RX ADMIN — LIDOCAINE HYDROCHLORIDE 0.5 ML: 10 INJECTION, SOLUTION INFILTRATION; PERINEURAL at 10:48

## 2019-07-02 RX ADMIN — EPHEDRINE SULFATE 5 MG: 50 INJECTION INTRAMUSCULAR; INTRAVENOUS; SUBCUTANEOUS at 12:30

## 2019-07-02 RX ADMIN — MIDAZOLAM HYDROCHLORIDE 0.5 MG: 1 INJECTION, SOLUTION INTRAMUSCULAR; INTRAVENOUS at 11:44

## 2019-07-02 ASSESSMENT — PAIN SCALES - GENERAL: PAIN_LEVEL: 0

## 2019-07-02 NOTE — OR NURSING
Patient to preop, allergies and NPO status verified, home medications reconciled, belongings secured, verbalizes understanding of pain scale, surgical site verified, IV access established, SCDs/ ROSETTA hose in place, triple aim completed.

## 2019-07-02 NOTE — OR SURGEON
Immediate Post OP Note    PreOp Diagnosis: squamous cell carcinoma right pre-tibial skin    PostOp Diagnosis: *same    Procedure(s):  EXCISION, CARCINOMA, BASAL CELL- FOR SQUAMOUS CELL DISTAL PRETIBIAL REGION - Wound Class: Clean  Advancement flap closure  For 1.5 cm2 defect    Surgeon(s):  Larry Tobin M.D.    Anesthesiologist/Type of Anesthesia: local with sedation  Anesthesiologist: Darcy Bran M.D./    Surgical Staff:  Circulator: Colin Zhong R.N.  Scrub Person: Laryr Ferris    Specimens removed if any:  ID Type Source Tests Collected by Time Destination   A : right leg pretibial region Tissue Leg PATHOLOGY SPECIMEN Larry Tobin M.D. 7/2/2019 12:16 PM        Estimated Blood Loss: minmal    Findings:  Tumor free margins on frozen    Complications: none        7/2/2019 12:42 PM Larry Tobin M.D.

## 2019-07-02 NOTE — ANESTHESIA TIME REPORT
Anesthesia Start and Stop Event Times     Date Time Event    7/2/2019 1144 Anesthesia Start     1250 Anesthesia Stop        Responsible Staff  07/02/19    Name Role Begin End    Darcy Bran M.D. Anesth 1144 1250        Preop Diagnosis (Free Text):  Pre-op Diagnosis     SQUAMOUS CELL CARCINOMA        Preop Diagnosis (Codes):  Diagnosis Information     Diagnosis Code(s):         Post op Diagnosis  Cancer of skin, squamous cell      Premium Reason  Non-Premium    Comments:

## 2019-07-02 NOTE — DISCHARGE INSTRUCTIONS
ACTIVITY: Rest and take it easy for the first 24 hours.  A responsible adult is recommended to remain with you during that time.  It is normal to feel sleepy.  We encourage you to not do anything that requires balance, judgment or coordination.    MILD FLU-LIKE SYMPTOMS ARE NORMAL. YOU MAY EXPERIENCE GENERALIZED MUSCLE ACHES, THROAT IRRITATION, HEADACHE AND/OR SOME NAUSEA.    FOR 24 HOURS DO NOT:  Drive, operate machinery or run household appliances.  Drink beer or alcoholic beverages.   Make important decisions or sign legal documents.    SPECIAL INSTRUCTIONS: ACTIVITY AS TOLERATE.  ELEVATE LEFT LOWER EXTREMITY.      DIET: To avoid nausea, slowly advance diet as tolerated, avoiding spicy or greasy foods for the first day.  Add more substantial food to your diet according to your physician's instructions.  Babies can be fed formula or breast milk as soon as they are hungry.  INCREASE FLUIDS AND FIBER TO AVOID CONSTIPATION.    SURGICAL DRESSING/BATHING: KEEP DRESSING CLEAN AND DRY.  DO NOT REMOVE UNTIL INSTRUCTED BY DR. SAUCEDA.     FOLLOW-UP APPOINTMENT:  A follow-up appointment should be arranged with your doctor; call to schedule.  FOLLOW UP APPOINTMENT ON  WEDNESDAY July 3, 2019      You should CALL YOUR PHYSICIAN if you develop:  Fever greater than 101 degrees F.  Pain not relieved by medication, or persistent nausea or vomiting.  Excessive bleeding (blood soaking through dressing) or unexpected drainage from the wound.  Extreme redness or swelling around the incision site, drainage of pus or foul smelling drainage.  Inability to urinate or empty your bladder within 8 hours.  Problems with breathing or chest pain.    You should call 911 if you develop problems with breathing or chest pain.  If you are unable to contact your doctor or surgical center, you should go to the nearest emergency room or urgent care center.      Physician's telephone #: DR. SAUCEDA [phone]     If any questions arise, call your  doctor.  If your doctor is not available, please feel free to call the Surgical Center at [phone].  The Center is open Monday through Friday from 7AM to 7PM.  You can also call the HEALTH HOTLINE open 24 hours/day, 7 days/week and speak to a nurse at [phone], or toll free at [phone].    A registered nurse may call you a few days after your surgery to see how you are doing after your procedure.    MEDICATIONS: Resume taking daily medication.  Take prescribed pain medication with food.  If no medication is prescribed, you may take non-aspirin pain medication if needed.  PAIN MEDICATION CAN BE VERY CONSTIPATING.  Take a stool softener or laxative such as senokot, pericolace, or milk of magnesia if needed.    Prescription given PRE OPERATVLEY.  NO ORAL PAIN MEDICATION GIVEN IN RECOVERY.     If your physician has prescribed pain medication that includes Acetaminophen (Tylenol), do not take additional Acetaminophen (Tylenol) while taking the prescribed medication.    Depression / Suicide Risk    As you are discharged from this [facility] facility, it is important to learn how to keep safe from harming yourself.    Recognize the warning signs:  · Abrupt changes in personality, positive or negative- including increase in energy   · Giving away possessions  · Change in eating patterns- significant weight changes-  positive or negative  · Change in sleeping patterns- unable to sleep or sleeping all the time   · Unwillingness or inability to communicate  · Depression  · Unusual sadness, discouragement and loneliness  · Talk of wanting to die  · Neglect of personal appearance   · Rebelliousness- reckless behavior  · Withdrawal from people/activities they love  · Confusion- inability to concentrate     If you or a loved one observes any of these behaviors or has concerns about self-harm, here's what you can do:  · Talk about it- your feelings and reasons for harming yourself  · Remove any means that you  might use to hurt yourself (examples: pills, rope, extension cords, firearm)  · Get professional help from the community (Mental Health, Substance Abuse, psychological counseling)  · Do not be alone:Call your Safe Contact- someone whom you trust who will be there for you.  · Call your local CRISIS HOTLINE 781-7149 or 532-184-6031  · Call your local Children's Mobile Crisis Response Team [place] (194) 206-3638 or www.CRAiLAR  · Call the toll free National Suicide Prevention Hotlines   · National Suicide Prevention Lifeline 792-071-TZZM (4284)  · National Hope Line Network 800-SUICIDE (141-3690)

## 2019-07-02 NOTE — OR NURSING
Report from aurelio, patient will be heading to stage 2 once stop bang is up.    1345: Patient states he is ready for stage 2, no reported pain.   1351: Patient transferred to stage 2.

## 2019-07-02 NOTE — ANESTHESIA POSTPROCEDURE EVALUATION
Patient: Juanito Marquez    Procedure Summary     Date:  07/02/19 Room / Location:   OR 05 / SURGERY [facility]    Anesthesia Start:  1144 Anesthesia Stop:  1250    Procedures:       EXCISION, CARCINOMA, BASAL CELL- FOR SQUAMOUS CELL DISTAL PRETIBIAL REGION (Right Leg Lower)      APPLICATION, GRAFT, SKIN, SPLIT-THICKNESS (Right Leg Lower) Diagnosis:  (SQUAMOUS CELL CARCINOMA)    Surgeon:  Larry Tobin M.D. Responsible Provider:  Darcy Bran M.D.    Anesthesia Type:  MAC ASA Status:  3          Final Anesthesia Type: MAC  Last vitals  BP   Blood Pressure : (!) 91/50, NIBP: (!) 94/51    Temp   36 °C (96.8 °F)    Pulse   Pulse: (!) 103, Heart Rate (Monitored): 67   Resp   16    SpO2   100 %      Anesthesia Post Evaluation    Patient location during evaluation: PACU  Patient participation: complete - patient participated  Level of consciousness: awake  Pain score: 0    Airway patency: patent  Anesthetic complications: no  Cardiovascular status: stable  Respiratory status: acceptable  Hydration status: acceptable    PONV: none           Nurse Pain Score: 0 (NPRS)

## 2019-07-02 NOTE — OR NURSING
1247 PT RECEIVED IN PACU, REPORT RECEIVED.  VSS, RESP SPONT, EVEN, NON LABORED. 1307 PT TO CPAP WHILE SLEEPING. VSS.  1315 VSS. PT DENIES PAIN, NAUSEA. TOLERATING PO FLUIDS.  1340 REPORT GIVEN TO OBDULIA COLE TO ASSUME CARE OF THIS PT.

## 2019-07-02 NOTE — OP REPORT
DATE OF SERVICE:  07/02/2019    PREOPERATIVE DIAGNOSIS:  Biopsy proven squamous cell in the pretibial region   of right leg.    POSTOPERATIVE DIAGNOSIS:  Biopsy proven squamous cell in the pretibial region   of right leg.    PROCEDURE PERFORMED:  Excision advancement flap closure, pathologic   confirmation and tumor free margins on frozen section.    OPERATING SURGEON:  Larry Tobin MD    ASSISTANT SURGEON:  DOM Peng    ANESTHESIOLOGIST:  Darcy Bran MD    ANESTHESIA:  Local with sedation.    INDICATIONS FOR OPERATION:  An 83-year-old gentleman who I have taken care   multiple times in the past for cutaneous malignancies.  He had a biopsy done   on a lesion on the right pretibial region that was done in [place] revealed   squamous cell carcinoma.  He was coming back to the [facility] for the   summertime.  Contacted me.  Exam revealed a well healing area on the right   leg.  The location was marked and this corresponded to the biopsy site   performed in [place].  The benefits, alternatives, and risks of the procedure   including, but not limited to bleeding, infection, scarring, possible need for   a split thickness skin graft were discussed at length with him.  He elected   to go ahead and proceed.    DESCRIPTION OF PROCEDURE:  The patient was taken to the operating room and   placed in supine position.  The leg was prepped with Betadine gel draped in a   sterile manner.  Anesthesia was achieved with local infiltration of 0.25%   Marcaine, 1:400,000 epinephrine; a total of 10 mL was utilized.  The patient   was also sedated by Dr. Bran.  5 mm margins were marked around the lesion.    The incision then included the subcutaneous fat.  Undermining was then done in   the sub-superficial fascial plane.  Several cuts were done in the fascia in   order to allow fasciocutaneous advancement flaps.  At this point in time,   closed with multiple interrupted vertical mattress sutures of 4-0 nylon and a    running 4-0 nylon.  Given to the pathologist who stated there was no visible   tumor on frozen section.  A dressing of Xeroform, 4x4, and a bias wrap were   then applied.  The patient was then taken from the operating room to the   recovery room with stable vital signs.  The blood loss was negligible and   sponge and needle count at the end of the case was reported as being correct.       ____________________________________     MD ROSALEE BAEZ / KEITH    DD:  07/02/2019 12:49:48  DT:  07/02/2019 13:09:13    D#:  [id number]  Job#:  [id number]

## 2019-07-02 NOTE — ANESTHESIA QCDR
2019 [facility] Clinical Data Registry (for Quality Improvement)     Postoperative nausea/vomiting risk protocol (Adult = 18 yrs and Pediatric 3-17 yrs)- (430 and 463)  General inhalation anesthetic (NOT TIVA) with PONV risk factors: No  Provision of anti-emetic therapy with at least 2 different classes of agents: N/A  Patient DID NOT receive anti-emetic therapy and reason is documented in Medical Record: N/A    Multimodal Pain Management- (AQI59)  Patient undergoing Elective Surgery (i.e. Outpatient, or ASC, or Prescheduled Surgery prior to Hospital Admission): Yes  Use of Multimodal Pain Management, two or more drugs and/or interventions, NOT including systemic opioids: Yes   Exception: Documented allergy to multiple classes of analgesics:  N/A    PACU assessment of acute postoperative pain prior to Anesthesia Care End- Applies to Patients Age = 18- (ABG7)  Initial PACU pain score is which of the following: < 7/10  Patient unable to report pain score: N/A    Post-anesthetic transfer of care checklist/protocol to PACU/ICU- (426 and 427)  Upon conclusion of case, patient transferred to which of the following locations: PACU/Non-ICU  Use of transfer checklist/protocol: Yes  Exclusion: Service Performed in Patient Hospital Room (and thus did not require transfer): N/A    PACU Reintubation- (AQI31)  General anesthesia requiring endotracheal intubation (ETT) along with subsequent extubation in OR or PACU: No  Required reintubation in the PACU: N/A  Extubation was a planned trial documented in the medical record prior to removal of the original airway device: N/A    Unplanned admission to ICU related to anesthesia service up through end of PACU care- (MD51)  Unplanned admission to ICU (not initially anticipated at anesthesia start time): No

## 2019-07-11 ENCOUNTER — OFFICE VISIT (OUTPATIENT)
Dept: PULMONOLOGY | Facility: HOSPICE | Age: 84
End: 2019-07-11
Payer: MEDICARE

## 2019-07-11 VITALS
HEIGHT: 72 IN | HEART RATE: 82 BPM | SYSTOLIC BLOOD PRESSURE: 108 MMHG | RESPIRATION RATE: 16 BRPM | OXYGEN SATURATION: 98 % | TEMPERATURE: 98.1 F | BODY MASS INDEX: 20.32 KG/M2 | WEIGHT: 150 LBS | DIASTOLIC BLOOD PRESSURE: 62 MMHG

## 2019-07-11 DIAGNOSIS — G47.33 OSA (OBSTRUCTIVE SLEEP APNEA): ICD-10-CM

## 2019-07-11 DIAGNOSIS — R06.02 SOB (SHORTNESS OF BREATH): ICD-10-CM

## 2019-07-11 PROCEDURE — 99214 OFFICE O/P EST MOD 30 MIN: CPT | Performed by: INTERNAL MEDICINE

## 2019-07-11 RX ORDER — DOXYCYCLINE HYCLATE 100 MG/1
CAPSULE ORAL
Refills: 0 | COMMUNITY
Start: 2019-06-24 | End: 2019-08-13

## 2019-07-11 ASSESSMENT — PAIN SCALES - GENERAL: PAINLEVEL: NO PAIN

## 2019-07-11 NOTE — LETTER
Doug Read M.D.  [facility] Pulmonary Medicine   [address] [place], [place] [address], [place] [address]  Phone: [phone] - Fax: [phone]           Encounter Date: 7/11/2019  Provider: Doug Read M.D.  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  He does have some facial skin lesions which were just treated and make wearing a CPAP mask impossible at this time.  He does feel that his current mask is not optimal and would like to see if he could have a mask fitting performed.  He lives in [place], [place] at [place].  In the winter he lives in [place].  This year when he returned to [facility] he did notice some increased dyspnea with exertion.  He is not wheezing.  He has no history of asthma or COPD.  With his history of obstructive sleep apnea and previous overnight oximetry showing some degree of hypoxemia there is a possibility of at least mild pulmonary hypertension.  We discussed that fact.  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  He will restart CPAP once his facial lesions heal.  It is also noted that his creatinine is 2.13 mg percent.    Past Medical History:   Diagnosis Date   • Back pain 6/23/2009   • Breath shortness     with altitude over 7300 feet   • CAD (coronary artery disease)     CABG 1994. Cardiogy with [facility].   • Cataract     Bilateral phaco with IOL   • Chronic diarrhea 7/21/2008   • Colon polyp 2007   • DM (diabetes mellitus) (HCC)     6/24/19-Avg AM glucose=130-180.    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   • Sleep apnea     CPAP   • Snoring    • status post CABG 1994   • Status post cholecystectomy 2004       ROS:   Constitutional: Denies fevers, chills, night sweats, fatigue or weight loss  Eyes: Denies vision loss, pain, drainage, double vision  Ears, Nose, Throat: Denies earache, tinnitus, hoarseness  Cardiovascular: Denies chest pain, tightness, palpitations  Respiratory: Denies  sputum production, cough, hemoptysis  Sleep: Has obstructive sleep apnea  GI: Denies abdominal pain, nausea, vomiting, diarrhea  : Denies frequent urination, hematuria, painful urination  Musculoskeletal: Denies back pain, painful joints, sore muscles  Neurological: Denies headaches, seizures  Skin: Facial lesions posttreatment  Psychiatric: Denies depression or thoughts of suicide  Hematologic: Denies bleeding tendency or clotting tendency  Allergic/Immunologic: Denies rhinitis, skin sensitivity    Social History     Social History   • Marital status:      Spouse name: N/A   • Number of children: N/A   • Years of education: N/A     Occupational History   • Not on file.     Social History Main Topics   • Smoking status: Never Smoker   • Smokeless tobacco: Never Used   • Alcohol use 2.4 oz/week     4 Glasses of wine per week      Comment: 1 per day   • Drug use: No   • Sexual activity: Not Currently     Other Topics Concern   • Not on file     Social History Narrative   • No narrative on file     Patient has no known allergies.  Current Outpatient Prescriptions on File Prior to Visit   Medication Sig Dispense Refill   • Coenzyme Q10 (CO Q10) 100 MG Cap Take 2 Caps by mouth every day.     • Empagliflozin 10 MG Tab Take 1 tablet by mouth every morning with breakfast. 90 Tab 3   • glimepiride (AMARYL) 1 MG tablet 2 mg in the morning and 1 mg before dinner (Patient taking differently: Take 1 mg by mouth 2 times a day.) 270 Tab 3   • Exenatide ER (BYDUREON) 2 MG Pen-injector Inject 2 mg as instructed every 7 days. 12 Each 3   • levothyroxine (SYNTHROID)  100 MCG Tab Take 1 Tab by mouth Every morning on an empty stomach. 90 Tab 3   • NIACINAMIDE PO Take 50 mg by mouth 2 Times a Day.     • rosuvastatin (CRESTOR) 10 MG Tab Take 1 Tab by mouth every evening. 90 Tab 3   • olmesartan (BENICAR) 40 MG Tab Take 1 Tab by mouth every day. (Patient taking differently: Take 20 mg by mouth every day.) 90 Tab 3   • ascorbic acid (ASCORBIC ACID) 500 MG TABS Take 1,000 mg by mouth every day.     • Omega-3 Fatty Acids (FISH OIL) 1200 MG CAPS Take 3 Caps by mouth every day.     • Cholecalciferol (VITAMIN D) 2000 UNIT TABS Take 1 Tab by mouth every day.     • aspirin 81 MG tablet Take 81 mg by mouth every day.     • fluorouracil (EFUDEX) 5 % cream Place on skin lesions as instructed 1 Tube 3     No current facility-administered medications on file prior to visit.      /62   Pulse 82   Temp 36.7 °C (98.1 °F) (Oral)   Resp 16   Ht 1.829 m (6')   Wt 68 kg (150 lb)   SpO2 98%   Family History   Problem Relation Age of Onset   • Cancer Mother         breast   • Heart Disease Mother    • Diabetes Father    • Cancer Father         bladder   • Other Father         ruptured appendix   • Diabetes Paternal Grandfather        Physical Exam:    HEENT: PERRLA, EOMI, no scleral icterus, no nasal or oral lesions  Neck: No thyromegaly, no adenopathy, no bruits  Mallampatti: Grade II  Lungs: Equal breath sounds, no wheezes or crackles  Heart: Regular rate and rhythm, no gallops or murmurs  Abdomen: Soft, benign, no organomegaly  Extremities: No clubbing, cyanosis, or edema  Neurologic: Cranial nerve, motor, and sensory exam are normal    1. FLEX (obstructive sleep apnea)    2. SOB (shortness of breath)        He will resume CPAP once his facial lesions heal.  We will arrange for a mask fit.  He is now less short of breath and when he originally returned from [place].  He would like to postpone obtaining an echocardiogram at this time.  He will continue follow-up with cardiology.  He has  had an evaluation of his kidney function with urine chemistries.  His creatinine has risen from 1.75 to 2.13 mg percent.      Electronically signed by Doug Read M.D.  on 07/16/19    No Recipients

## 2019-07-15 NOTE — TELEPHONE ENCOUNTER
Ulices Mcintyre M.D.   Anais Senior R.N. 2 days ago      I agree with Dr Willson on procedure clearance can proceed but ideally continue aspirin 81 mg daily, if not feasible also reasonable to hold aspirin for short term     Please let the patient know       Thank you (Routing comment)      Chart review shows that squamous cell removal was completed on 7/2/19

## 2019-07-16 NOTE — PROGRESS NOTES
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  He does have some facial skin lesions which were just treated and make wearing a CPAP mask impossible at this time.  He does feel that his current mask is not optimal and would like to see if he could have a mask fitting performed.  He lives in [place], [place] at altitude.  In the winter he lives in [place].  This year when he returned to [facility] he did notice some increased dyspnea with exertion.  He is not wheezing.  He has no history of asthma or COPD.  With his history of obstructive sleep apnea and previous overnight oximetry showing some degree of hypoxemia there is a possibility of at least mild pulmonary hypertension.  We discussed that fact.  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  He will restart CPAP once his facial lesions heal.  It is also noted that his creatinine is 2.13 mg percent.    Past Medical History:   Diagnosis Date   • Back pain 6/23/2009   • Breath shortness     with altitude over 7300 feet   • CAD (coronary artery disease)     CABG 1994. Cardiogy with [facility].   • Cataract     Bilateral phaco with IOL   • Chronic diarrhea 7/21/2008   • Colon polyp 2007   • DM (diabetes mellitus) ([facility])     6/24/19-Avg AM glucose=130-180.    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   • Sleep apnea     CPAP   • Snoring    • status post CABG 1994   • Status post cholecystectomy 2004       ROS:   Constitutional: Denies fevers, chills, night sweats, fatigue or weight loss  Eyes: Denies vision loss, pain, drainage, double vision  Ears, Nose, Throat: Denies earache, tinnitus, hoarseness  Cardiovascular: Denies chest pain, tightness, palpitations  Respiratory: Denies  sputum production, cough,  hemoptysis  Sleep: Has obstructive sleep apnea  GI: Denies abdominal pain, nausea, vomiting, diarrhea  : Denies frequent urination, hematuria, painful urination  Musculoskeletal: Denies back pain, painful joints, sore muscles  Neurological: Denies headaches, seizures  Skin: Facial lesions posttreatment  Psychiatric: Denies depression or thoughts of suicide  Hematologic: Denies bleeding tendency or clotting tendency  Allergic/Immunologic: Denies rhinitis, skin sensitivity    Social History     Social History   • Marital status:      Spouse name: N/A   • Number of children: N/A   • Years of education: N/A     Occupational History   • Not on file.     Social History Main Topics   • Smoking status: Never Smoker   • Smokeless tobacco: Never Used   • Alcohol use 2.4 oz/week     4 Glasses of wine per week      Comment: 1 per day   • Drug use: No   • Sexual activity: Not Currently     Other Topics Concern   • Not on file     Social History Narrative   • No narrative on file     Patient has no known allergies.  Current Outpatient Prescriptions on File Prior to Visit   Medication Sig Dispense Refill   • Coenzyme Q10 (CO Q10) 100 MG Cap Take 2 Caps by mouth every day.     • Empagliflozin 10 MG Tab Take 1 tablet by mouth every morning with breakfast. 90 Tab 3   • glimepiride (AMARYL) 1 MG tablet 2 mg in the morning and 1 mg before dinner (Patient taking differently: Take 1 mg by mouth 2 times a day.) 270 Tab 3   • Exenatide ER (BYDUREON) 2 MG Pen-injector Inject 2 mg as instructed every 7 days. 12 Each 3   • levothyroxine (SYNTHROID) 100 MCG Tab Take 1 Tab by mouth Every morning on an empty stomach. 90 Tab 3   • NIACINAMIDE PO Take 50 mg by mouth 2 Times a Day.     • rosuvastatin (CRESTOR) 10 MG Tab Take 1 Tab by mouth every evening. 90 Tab 3   • olmesartan (BENICAR) 40 MG Tab Take 1 Tab by mouth every day. (Patient taking differently: Take 20 mg by mouth every day.) 90 Tab 3   • ascorbic acid (ASCORBIC ACID) 500 MG  TABS Take 1,000 mg by mouth every day.     • Omega-3 Fatty Acids (FISH OIL) 1200 MG CAPS Take 3 Caps by mouth every day.     • Cholecalciferol (VITAMIN D) 2000 UNIT TABS Take 1 Tab by mouth every day.     • aspirin 81 MG tablet Take 81 mg by mouth every day.     • fluorouracil (EFUDEX) 5 % cream Place on skin lesions as instructed 1 Tube 3     No current facility-administered medications on file prior to visit.      /62   Pulse 82   Temp 36.7 °C (98.1 °F) (Oral)   Resp 16   Ht 1.829 m (6')   Wt 68 kg (150 lb)   SpO2 98%   Family History   Problem Relation Age of Onset   • Cancer Mother         breast   • Heart Disease Mother    • Diabetes Father    • Cancer Father         bladder   • Other Father         ruptured appendix   • Diabetes Paternal Grandfather        Physical Exam:    HEENT: PERRLA, EOMI, no scleral icterus, no nasal or oral lesions  Neck: No thyromegaly, no adenopathy, no bruits  Mallampatti: Grade II  Lungs: Equal breath sounds, no wheezes or crackles  Heart: Regular rate and rhythm, no gallops or murmurs  Abdomen: Soft, benign, no organomegaly  Extremities: No clubbing, cyanosis, or edema  Neurologic: Cranial nerve, motor, and sensory exam are normal    1. FLEX (obstructive sleep apnea)    2. SOB (shortness of breath)        He will resume CPAP once his facial lesions heal.  We will arrange for a mask fit.  He is now less short of breath and when he originally returned from [place].  He would like to postpone obtaining an echocardiogram at this time.  He will continue follow-up with cardiology.  He has had an evaluation of his kidney function with urine chemistries.  His creatinine has risen from 1.75 to 2.13 mg percent.

## 2019-07-19 NOTE — OP REPORT
DATE OF SERVICE:  07/02/2019    ADDENDUM    The diameter of the lesion excised with 1 cm margins of 5 mm on each side for   a total diameter defect of 2 cm.       ____________________________________     MD ROSALEE BAEZ / KEITH    DD:  07/19/2019 11:18:18  DT:  07/19/2019 12:45:23    D#:  [id number]  Job#:  [id number]

## 2019-08-08 ENCOUNTER — TELEPHONE (OUTPATIENT)
Dept: CARDIOLOGY | Facility: MEDICAL CENTER | Age: 84
End: 2019-08-08

## 2019-08-08 NOTE — LETTER
PROCEDURE/SURGERY CLEARANCE FORM      Encounter Date: 8/8/2019    Patient: Juanito Marquez  YOB: 1935    CARDIOLOGIST:  Ulices Mcintyre MD PhD FACC  REFERRING DOCTOR:  Ciro Ferguson MD    The above patient is evaluated from a cardiovascular standpoint and he is safe to proceed to have the following procedure/surgery: Right Inguinal Hernia Repair                                           Additional comments: No further testing is required; hold anti-platelet as necessary.    It is my pleasure to participate in the care of Mr. Marquez.  Please do not hesitate to contact me with questions or concerns. [facility] is available 24/7 for consultative services at [phone] in the perioperative period.     Electronically Signed     Ulices Mcintyre MD PhD FACC  Cardiologist [facility]

## 2019-08-08 NOTE — TELEPHONE ENCOUNTER
Received fax from [facility] requesting cardiac clearance for upcoming Right Inguinal Hernia Repair surgery 08/15/2019.    Clearance request relayed to MD for advise.

## 2019-08-12 NOTE — TELEPHONE ENCOUNTER
Letter printed with MD recommendations and faxed to Dr. Ferguson, [phone], completed status.    Clearance request sent to scanning for reference.    Called pt, spoke with pt spouse, Genoveva, and reviewed MD recommendations.  She verbalizes understanding and is appreciative of information given.

## 2019-08-13 DIAGNOSIS — Z01.812 PRE-OPERATIVE LABORATORY EXAMINATION: ICD-10-CM

## 2019-08-13 DIAGNOSIS — Z01.810 PRE-OPERATIVE CARDIOVASCULAR EXAMINATION: ICD-10-CM

## 2019-08-13 LAB
ALBUMIN SERPL BCP-MCNC: 4.1 G/DL (ref 3.2–4.9)
ALBUMIN/GLOB SERPL: 1.5 G/DL
ALP SERPL-CCNC: 53 U/L (ref 30–99)
ALT SERPL-CCNC: 28 U/L (ref 2–50)
ANION GAP SERPL CALC-SCNC: 8 MMOL/L (ref 0–11.9)
AST SERPL-CCNC: 26 U/L (ref 12–45)
BASOPHILS # BLD AUTO: 0.3 % (ref 0–1.8)
BASOPHILS # BLD: 0.02 K/UL (ref 0–0.12)
BILIRUB SERPL-MCNC: 0.6 MG/DL (ref 0.1–1.5)
BUN SERPL-MCNC: 33 MG/DL (ref 8–22)
CALCIUM SERPL-MCNC: 9.6 MG/DL (ref 8.5–10.5)
CHLORIDE SERPL-SCNC: 108 MMOL/L (ref 96–112)
CO2 SERPL-SCNC: 26 MMOL/L (ref 20–33)
CREAT SERPL-MCNC: 2.08 MG/DL (ref 0.5–1.4)
EKG IMPRESSION: NORMAL
EOSINOPHIL # BLD AUTO: 0.19 K/UL (ref 0–0.51)
EOSINOPHIL NFR BLD: 2.7 % (ref 0–6.9)
ERYTHROCYTE [DISTWIDTH] IN BLOOD BY AUTOMATED COUNT: 54.2 FL (ref 35.9–50)
GLOBULIN SER CALC-MCNC: 2.7 G/DL (ref 1.9–3.5)
GLUCOSE SERPL-MCNC: 208 MG/DL (ref 65–99)
HCT VFR BLD AUTO: 44.4 % (ref 42–52)
HGB BLD-MCNC: 14.1 G/DL (ref 14–18)
IMM GRANULOCYTES # BLD AUTO: 0.02 K/UL (ref 0–0.11)
IMM GRANULOCYTES NFR BLD AUTO: 0.3 % (ref 0–0.9)
LYMPHOCYTES # BLD AUTO: 0.81 K/UL (ref 1–4.8)
LYMPHOCYTES NFR BLD: 11.6 % (ref 22–41)
MCH RBC QN AUTO: 33.2 PG (ref 27–33)
MCHC RBC AUTO-ENTMCNC: 31.8 G/DL (ref 33.7–35.3)
MCV RBC AUTO: 104.5 FL (ref 81.4–97.8)
MONOCYTES # BLD AUTO: 0.68 K/UL (ref 0–0.85)
MONOCYTES NFR BLD AUTO: 9.7 % (ref 0–13.4)
NEUTROPHILS # BLD AUTO: 5.27 K/UL (ref 1.82–7.42)
NEUTROPHILS NFR BLD: 75.4 % (ref 44–72)
NRBC # BLD AUTO: 0 K/UL
NRBC BLD-RTO: 0 /100 WBC
PLATELET # BLD AUTO: 159 K/UL (ref 164–446)
PMV BLD AUTO: 9.3 FL (ref 9–12.9)
POTASSIUM SERPL-SCNC: 4.8 MMOL/L (ref 3.6–5.5)
PROT SERPL-MCNC: 6.8 G/DL (ref 6–8.2)
RBC # BLD AUTO: 4.25 M/UL (ref 4.7–6.1)
SODIUM SERPL-SCNC: 142 MMOL/L (ref 135–145)
WBC # BLD AUTO: 7 K/UL (ref 4.8–10.8)

## 2019-08-13 PROCEDURE — 93010 ELECTROCARDIOGRAM REPORT: CPT | Performed by: INTERNAL MEDICINE

## 2019-08-13 PROCEDURE — 80053 COMPREHEN METABOLIC PANEL: CPT

## 2019-08-13 PROCEDURE — 85025 COMPLETE CBC W/AUTO DIFF WBC: CPT

## 2019-08-13 PROCEDURE — 93005 ELECTROCARDIOGRAM TRACING: CPT | Performed by: SURGERY

## 2019-08-13 PROCEDURE — 36415 COLL VENOUS BLD VENIPUNCTURE: CPT

## 2019-08-13 SDOH — HEALTH STABILITY: MENTAL HEALTH: HOW MANY STANDARD DRINKS CONTAINING ALCOHOL DO YOU HAVE ON A TYPICAL DAY?: 1 OR 2

## 2019-08-15 ENCOUNTER — ANESTHESIA EVENT (OUTPATIENT)
Dept: SURGERY | Facility: MEDICAL CENTER | Age: 84
End: 2019-08-15
Payer: MEDICARE

## 2019-08-15 ENCOUNTER — HOSPITAL ENCOUNTER (OUTPATIENT)
Facility: MEDICAL CENTER | Age: 84
End: 2019-08-15
Attending: SURGERY | Admitting: SURGERY
Payer: MEDICARE

## 2019-08-15 ENCOUNTER — ANESTHESIA (OUTPATIENT)
Dept: SURGERY | Facility: MEDICAL CENTER | Age: 84
End: 2019-08-15
Payer: MEDICARE

## 2019-08-15 VITALS
DIASTOLIC BLOOD PRESSURE: 58 MMHG | WEIGHT: 147.71 LBS | SYSTOLIC BLOOD PRESSURE: 115 MMHG | HEART RATE: 99 BPM | OXYGEN SATURATION: 99 % | TEMPERATURE: 97.2 F | RESPIRATION RATE: 18 BRPM | HEIGHT: 72 IN | BODY MASS INDEX: 20.01 KG/M2

## 2019-08-15 DIAGNOSIS — K40.90 RIGHT INGUINAL HERNIA: ICD-10-CM

## 2019-08-15 LAB
GLUCOSE BLD-MCNC: 117 MG/DL (ref 65–99)
INR PPP: 0.97 (ref 0.87–1.13)
PROTHROMBIN TIME: 13 SEC (ref 12–14.6)

## 2019-08-15 PROCEDURE — 500380 HCHG DRAIN, PENROSE 1/4X12: Performed by: SURGERY

## 2019-08-15 PROCEDURE — C1781 MESH (IMPLANTABLE): HCPCS | Performed by: SURGERY

## 2019-08-15 PROCEDURE — 700101 HCHG RX REV CODE 250: Performed by: ANESTHESIOLOGY

## 2019-08-15 PROCEDURE — 700111 HCHG RX REV CODE 636 W/ 250 OVERRIDE (IP): Performed by: ANESTHESIOLOGY

## 2019-08-15 PROCEDURE — A6402 STERILE GAUZE <= 16 SQ IN: HCPCS | Performed by: SURGERY

## 2019-08-15 PROCEDURE — 160046 HCHG PACU - 1ST 60 MINS PHASE II: Performed by: SURGERY

## 2019-08-15 PROCEDURE — 82962 GLUCOSE BLOOD TEST: CPT

## 2019-08-15 PROCEDURE — 700102 HCHG RX REV CODE 250 W/ 637 OVERRIDE(OP): Performed by: ANESTHESIOLOGY

## 2019-08-15 PROCEDURE — 160039 HCHG SURGERY MINUTES - EA ADDL 1 MIN LEVEL 3: Performed by: SURGERY

## 2019-08-15 PROCEDURE — 160048 HCHG OR STATISTICAL LEVEL 1-5: Performed by: SURGERY

## 2019-08-15 PROCEDURE — 501838 HCHG SUTURE GENERAL: Performed by: SURGERY

## 2019-08-15 PROCEDURE — 160002 HCHG RECOVERY MINUTES (STAT): Performed by: SURGERY

## 2019-08-15 PROCEDURE — 160025 RECOVERY II MINUTES (STATS): Performed by: SURGERY

## 2019-08-15 PROCEDURE — 501445 HCHG STAPLER, SKIN DISP: Performed by: SURGERY

## 2019-08-15 PROCEDURE — A9270 NON-COVERED ITEM OR SERVICE: HCPCS | Performed by: ANESTHESIOLOGY

## 2019-08-15 PROCEDURE — 160028 HCHG SURGERY MINUTES - 1ST 30 MINS LEVEL 3: Performed by: SURGERY

## 2019-08-15 PROCEDURE — 160035 HCHG PACU - 1ST 60 MINS PHASE I: Performed by: SURGERY

## 2019-08-15 PROCEDURE — 160009 HCHG ANES TIME/MIN: Performed by: SURGERY

## 2019-08-15 PROCEDURE — 700101 HCHG RX REV CODE 250: Performed by: SURGERY

## 2019-08-15 PROCEDURE — 85610 PROTHROMBIN TIME: CPT

## 2019-08-15 PROCEDURE — 700105 HCHG RX REV CODE 258: Performed by: SURGERY

## 2019-08-15 DEVICE — MESH FLAT SHEET 3 X 6 - (3EA/CA): Type: IMPLANTABLE DEVICE | Site: GROIN | Status: FUNCTIONAL

## 2019-08-15 RX ORDER — HYDROMORPHONE HYDROCHLORIDE 1 MG/ML
0.2 INJECTION, SOLUTION INTRAMUSCULAR; INTRAVENOUS; SUBCUTANEOUS
Status: DISCONTINUED | OUTPATIENT
Start: 2019-08-15 | End: 2019-08-15 | Stop reason: HOSPADM

## 2019-08-15 RX ORDER — BUPIVACAINE HYDROCHLORIDE AND EPINEPHRINE 5; 5 MG/ML; UG/ML
INJECTION, SOLUTION EPIDURAL; INTRACAUDAL; PERINEURAL
Status: DISCONTINUED | OUTPATIENT
Start: 2019-08-15 | End: 2019-08-15 | Stop reason: HOSPADM

## 2019-08-15 RX ORDER — SODIUM CHLORIDE, SODIUM LACTATE, POTASSIUM CHLORIDE, CALCIUM CHLORIDE 600; 310; 30; 20 MG/100ML; MG/100ML; MG/100ML; MG/100ML
INJECTION, SOLUTION INTRAVENOUS CONTINUOUS
Status: DISCONTINUED | OUTPATIENT
Start: 2019-08-15 | End: 2019-08-15 | Stop reason: HOSPADM

## 2019-08-15 RX ORDER — HYDROMORPHONE HYDROCHLORIDE 1 MG/ML
0.4 INJECTION, SOLUTION INTRAMUSCULAR; INTRAVENOUS; SUBCUTANEOUS
Status: DISCONTINUED | OUTPATIENT
Start: 2019-08-15 | End: 2019-08-15 | Stop reason: HOSPADM

## 2019-08-15 RX ORDER — OXYCODONE HCL 10 MG/1
10 TABLET, FILM COATED, EXTENDED RELEASE ORAL ONCE
Status: COMPLETED | OUTPATIENT
Start: 2019-08-15 | End: 2019-08-15

## 2019-08-15 RX ORDER — LIDOCAINE HYDROCHLORIDE 10 MG/ML
INJECTION, SOLUTION EPIDURAL; INFILTRATION; INTRACAUDAL; PERINEURAL
Status: COMPLETED
Start: 2019-08-15 | End: 2019-08-15

## 2019-08-15 RX ORDER — GABAPENTIN 300 MG/1
300 CAPSULE ORAL ONCE
Status: COMPLETED | OUTPATIENT
Start: 2019-08-15 | End: 2019-08-15

## 2019-08-15 RX ORDER — HALOPERIDOL 5 MG/ML
1 INJECTION INTRAMUSCULAR
Status: DISCONTINUED | OUTPATIENT
Start: 2019-08-15 | End: 2019-08-15 | Stop reason: HOSPADM

## 2019-08-15 RX ORDER — HYDROMORPHONE HYDROCHLORIDE 1 MG/ML
0.1 INJECTION, SOLUTION INTRAMUSCULAR; INTRAVENOUS; SUBCUTANEOUS
Status: DISCONTINUED | OUTPATIENT
Start: 2019-08-15 | End: 2019-08-15 | Stop reason: HOSPADM

## 2019-08-15 RX ORDER — ONDANSETRON 2 MG/ML
INJECTION INTRAMUSCULAR; INTRAVENOUS PRN
Status: DISCONTINUED | OUTPATIENT
Start: 2019-08-15 | End: 2019-08-15 | Stop reason: SURG

## 2019-08-15 RX ORDER — CEFAZOLIN SODIUM 1 G/3ML
INJECTION, POWDER, FOR SOLUTION INTRAMUSCULAR; INTRAVENOUS PRN
Status: DISCONTINUED | OUTPATIENT
Start: 2019-08-15 | End: 2019-08-15 | Stop reason: SURG

## 2019-08-15 RX ORDER — TRIAMCINOLONE ACETONIDE 55 UG/1
2 SPRAY, METERED NASAL DAILY
COMMUNITY

## 2019-08-15 RX ORDER — DEXAMETHASONE SODIUM PHOSPHATE 4 MG/ML
INJECTION, SOLUTION INTRA-ARTICULAR; INTRALESIONAL; INTRAMUSCULAR; INTRAVENOUS; SOFT TISSUE PRN
Status: DISCONTINUED | OUTPATIENT
Start: 2019-08-15 | End: 2019-08-15 | Stop reason: SURG

## 2019-08-15 RX ORDER — ONDANSETRON 2 MG/ML
4 INJECTION INTRAMUSCULAR; INTRAVENOUS
Status: DISCONTINUED | OUTPATIENT
Start: 2019-08-15 | End: 2019-08-15 | Stop reason: HOSPADM

## 2019-08-15 RX ORDER — GLIMEPIRIDE 1 MG/1
1 TABLET ORAL 2 TIMES DAILY
COMMUNITY

## 2019-08-15 RX ORDER — DIPHENHYDRAMINE HYDROCHLORIDE 50 MG/ML
12.5 INJECTION INTRAMUSCULAR; INTRAVENOUS
Status: DISCONTINUED | OUTPATIENT
Start: 2019-08-15 | End: 2019-08-15 | Stop reason: HOSPADM

## 2019-08-15 RX ORDER — ACETAMINOPHEN 500 MG
1000 TABLET ORAL ONCE
Status: COMPLETED | OUTPATIENT
Start: 2019-08-15 | End: 2019-08-15

## 2019-08-15 RX ORDER — OLMESARTAN MEDOXOMIL 40 MG/1
20 TABLET ORAL EVERY EVENING
COMMUNITY
End: 2021-08-23

## 2019-08-15 RX ADMIN — OXYCODONE HYDROCHLORIDE 10 MG: 10 TABLET, FILM COATED, EXTENDED RELEASE ORAL at 12:20

## 2019-08-15 RX ADMIN — DEXAMETHASONE SODIUM PHOSPHATE 8 MG: 4 INJECTION, SOLUTION INTRA-ARTICULAR; INTRALESIONAL; INTRAMUSCULAR; INTRAVENOUS; SOFT TISSUE at 13:19

## 2019-08-15 RX ADMIN — EPHEDRINE SULFATE 10 MG: 50 INJECTION INTRAMUSCULAR; INTRAVENOUS; SUBCUTANEOUS at 13:24

## 2019-08-15 RX ADMIN — FENTANYL CITRATE 150 MCG: 50 INJECTION, SOLUTION INTRAMUSCULAR; INTRAVENOUS at 13:44

## 2019-08-15 RX ADMIN — EPHEDRINE SULFATE 10 MG: 50 INJECTION INTRAMUSCULAR; INTRAVENOUS; SUBCUTANEOUS at 14:04

## 2019-08-15 RX ADMIN — ONDANSETRON 4 MG: 2 INJECTION INTRAMUSCULAR; INTRAVENOUS at 13:19

## 2019-08-15 RX ADMIN — ACETAMINOPHEN 1000 MG: 500 TABLET ORAL at 12:20

## 2019-08-15 RX ADMIN — SODIUM CHLORIDE, POTASSIUM CHLORIDE, SODIUM LACTATE AND CALCIUM CHLORIDE: 600; 310; 30; 20 INJECTION, SOLUTION INTRAVENOUS at 11:35

## 2019-08-15 RX ADMIN — FENTANYL CITRATE 100 MCG: 50 INJECTION, SOLUTION INTRAMUSCULAR; INTRAVENOUS at 13:19

## 2019-08-15 RX ADMIN — CEFAZOLIN 2 G: 330 INJECTION, POWDER, FOR SOLUTION INTRAMUSCULAR; INTRAVENOUS at 13:13

## 2019-08-15 RX ADMIN — GABAPENTIN 300 MG: 300 CAPSULE ORAL at 12:20

## 2019-08-15 ASSESSMENT — PAIN SCALES - GENERAL: PAIN_LEVEL: 2

## 2019-08-15 NOTE — ANESTHESIA TIME REPORT
Anesthesia Start and Stop Event Times     Date Time Event    8/15/2019 1213 Ready for Procedure     1313 Anesthesia Start     1424 Anesthesia Stop        Responsible Staff  08/15/19    Name Role Begin End    Mick Pierre M.D. Anesth 1313 1424        Preop Diagnosis (Free Text):  Pre-op Diagnosis     INGUINAL HERNIA RIGHT REDUCIBLE        Preop Diagnosis (Codes):    Post op Diagnosis  Right inguinal hernia      Premium Reason  Non-Premium    Comments:

## 2019-08-15 NOTE — ANESTHESIA PROCEDURE NOTES
Airway  Date/Time: 8/15/2019 1:20 PM  Performed by: Mick Pierre M.D.  Authorized by: Mick Pierre M.D.     Location:  OR  Urgency:  Elective  Difficult Airway: No    Indications for Airway Management:  Anesthesia  Spontaneous Ventilation: present    Sedation Level:  Deep  Preoxygenated: Yes    Patient Position:  Sniffing  MILS Maintained Throughout: Yes    Final Airway Type:  Supraglottic airway  Final Supraglottic Airway:  Standard LMA  SGA Size:  5  Number of Attempts at Approach:  1

## 2019-08-15 NOTE — ANESTHESIA POSTPROCEDURE EVALUATION
Patient: Juanito Marquez    Procedure Summary     Date:  08/15/19 Room / Location:  [place] [address] / SURGERY [facility]    Anesthesia Start:  1313 Anesthesia Stop:  1424    Procedure:  REPAIR, HERNIA, INGUINAL (Right Groin) Diagnosis:  (INGUINAL HERNIA RIGHT REDUCIBLE)    Surgeon:  Ciro Ferguson M.D. Responsible Provider:  Mick Pierre M.D.    Anesthesia Type:  general ASA Status:  2          Final Anesthesia Type: general  Last vitals  BP   Blood Pressure : 105/70    Temp   36.7 °C (98.1 °F)    Pulse   Pulse: 92   Resp   18    SpO2   96 %      Anesthesia Post Evaluation    Patient location during evaluation: PACU  Patient participation: complete - patient participated  Level of consciousness: awake  Pain score: 2    Airway patency: patent  Anesthetic complications: no  Cardiovascular status: adequate  Respiratory status: acceptable  Hydration status: acceptable    PONV: none           Nurse Pain Score: 0 (NPRS)

## 2019-08-15 NOTE — OR NURSING
Assumed care of patient at approx 1525.  Patient alert and oriented x 4. See flowsheets for VS.Pain is rated 0/10.     Call light and personal belongings within reach. [place]ney in lowest position. Monitor alarms set appropriately.    Dressing is CDI. See flowsheets for detailed wound documentation.

## 2019-08-15 NOTE — ANESTHESIA QCDR
2019 [facility] Clinical Data Registry (for Quality Improvement)     Postoperative nausea/vomiting risk protocol (Adult = 18 yrs and Pediatric 3-17 yrs)- (430 and 463)  General inhalation anesthetic (NOT TIVA) with PONV risk factors: Yes  Provision of anti-emetic therapy with at least 2 different classes of agents: Yes   Patient DID NOT receive anti-emetic therapy and reason is documented in Medical Record:  N/A    Multimodal Pain Management- (AQI59)  Patient undergoing Elective Surgery (i.e. Outpatient, or ASC, or Prescheduled Surgery prior to Hospital Admission): Yes  Use of Multimodal Pain Management, two or more drugs and/or interventions, NOT including systemic opioids: No   Exception: Documented allergy to multiple classes of analgesics:         PACU assessment of acute postoperative pain prior to Anesthesia Care End- Applies to Patients Age = 18- (ABG7)  Initial PACU pain score is which of the following: < 7/10  Patient unable to report pain score: N/A    Post-anesthetic transfer of care checklist/protocol to PACU/ICU- (426 and 427)  Upon conclusion of case, patient transferred to which of the following locations: PACU/Non-ICU  Use of transfer checklist/protocol: Yes  Exclusion: Service Performed in Patient Hospital Room (and thus did not require transfer): N/A    PACU Reintubation- (AQI31)  General anesthesia requiring endotracheal intubation (ETT) along with subsequent extubation in OR or PACU: Yes  Required reintubation in the PACU: No   Extubation was a planned trial documented in the medical record prior to removal of the original airway device:  N/A    Unplanned admission to ICU related to anesthesia service up through end of PACU care- (MD51)  Unplanned admission to ICU (not initially anticipated at anesthesia start time): No

## 2019-08-15 NOTE — ANESTHESIA PREPROCEDURE EVALUATION
Relevant Problems   CARDIAC   (+) Coronary artery disease due to lipid rich plaque   (+) Essential hypertension, benign   (+) S/P CABG (coronary artery bypass graft)         (+) Renal insufficiency      ENDO   (+) Hypothyroidism   (+) Type 2 diabetes mellitus, controlled (HCC)       Physical Exam    Airway   Mallampati: I  TM distance: >3 FB  Neck ROM: full       Cardiovascular - normal exam  Rhythm: regular  Rate: normal     Dental - normal exam         Pulmonary    Abdominal - normal exam  Abdomen: soft     Neurological - normal exam                 Anesthesia Plan    ASA 2       Plan - general           Plan Factors:   Patient was not previously instructed to abstain from smoking on day of procedure.  Patient did not smoke on day of procedure.    Induction: intravenous    Postoperative Plan: Postoperative administration of opioids is intended.        Informed Consent:    Anesthetic plan and risks discussed with patient.    Use of blood products discussed with: patient whom.

## 2019-08-15 NOTE — DISCHARGE INSTRUCTIONS
ACTIVITY: Rest and take it easy for the first 24 hours.  A responsible adult is recommended to remain with you during that time.  It is normal to feel sleepy.  We encourage you to not do anything that requires balance, judgment or coordination.    MILD FLU-LIKE SYMPTOMS ARE NORMAL. YOU MAY EXPERIENCE GENERALIZED MUSCLE ACHES, THROAT IRRITATION, HEADACHE AND/OR SOME NAUSEA.    FOR 24 HOURS DO NOT:  Drive, operate machinery or run household appliances.  Drink beer or alcoholic beverages.   Make important decisions or sign legal documents.    SPECIAL INSTRUCTIONS:   Ice pack intermittently to right groin for 48 hours  Remove plastic and gauze in 3 days  Leave underlying steristips on until they fall off  Patient may shower on Post OP Day #2  Avoid any heavy lifting more than 15 pounds for 4 weeks    DIET: To avoid nausea, slowly advance diet as tolerated, avoiding spicy or greasy foods for the first day.  Add more substantial food to your diet according to your physician's instructions.  Babies can be fed formula or breast milk as soon as they are hungry.  INCREASE FLUIDS AND FIBER TO AVOID CONSTIPATION.    SURGICAL DRESSING/BATHING:   Remove plastic and gauze in 3 days  Leave underlying steristips on until they fall off  Patient may shower on Post OP Day #2    FOLLOW-UP APPOINTMENT:  A follow-up appointment should be arranged with your doctor in 1-2 weeks; call to schedule.    You should CALL YOUR PHYSICIAN if you develop:  Fever greater than 101 degrees F.  Pain not relieved by medication, or persistent nausea or vomiting.  Excessive bleeding (blood soaking through dressing) or unexpected drainage from the wound.  Extreme redness or swelling around the incision site, drainage of pus or foul smelling drainage.  Inability to urinate or empty your bladder within 8 hours.  Problems with breathing or chest pain.    You should call 911 if you develop problems with breathing or chest pain.  If you are unable to contact  your doctor or surgical center, you should go to the nearest emergency room or urgent care center.  Physician's telephone #: [phone] Dr Ciro Ferguson    If any questions arise, call your doctor.  If your doctor is not available, please feel free to call the Surgical Center at [phone].  The Center is open Monday through Friday from 7AM to 7PM.  You can also call the HEALTH HOTLINE open 24 hours/day, 7 days/week and speak to a nurse at [phone], or toll free at [phone].    A registered nurse may call you a few days after your surgery to see how you are doing after your procedure.    MEDICATIONS: Resume taking daily medication.  Take prescribed pain medication with food.  If no medication is prescribed, you may take non-aspirin pain medication if needed.  PAIN MEDICATION CAN BE VERY CONSTIPATING.  Take a stool softener or laxative such as senokot, pericolace, or milk of magnesia if needed.    Prescription given for Norco (pain).  Last pain medication given at 12:20pm.    If your physician has prescribed pain medication that includes Acetaminophen (Tylenol), do not take additional Acetaminophen (Tylenol) while taking the prescribed medication.    Depression / Suicide Risk    As you are discharged from this [facility] facility, it is important to learn how to keep safe from harming yourself.    Recognize the warning signs:  · Abrupt changes in personality, positive or negative- including increase in energy   · Giving away possessions  · Change in eating patterns- significant weight changes-  positive or negative  · Change in sleeping patterns- unable to sleep or sleeping all the time   · Unwillingness or inability to communicate  · Depression  · Unusual sadness, discouragement and loneliness  · Talk of wanting to die  · Neglect of personal appearance   · Rebelliousness- reckless behavior  · Withdrawal from people/activities they love  · Confusion- inability to concentrate     If you or a loved  one observes any of these behaviors or has concerns about self-harm, here's what you can do:  · Talk about it- your feelings and reasons for harming yourself  · Remove any means that you might use to hurt yourself (examples: pills, rope, extension cords, firearm)  · Get professional help from the community (Mental Health, Substance Abuse, psychological counseling)  · Do not be alone:Call your Safe Contact- someone whom you trust who will be there for you.  · Call your local CRISIS HOTLINE 569-8130 or 602-891-4432  · Call your local Children's Mobile Crisis Response Team [place] (037) 095-3028 or www.[employer]  · Call the toll free National Suicide Prevention Hotlines   · National Suicide Prevention Lifeline 991-249-TKST (6416)  · National Hope Line Network 800-SUICIDE (247-2870)

## 2019-08-16 NOTE — OP REPORT
DATE OF SERVICE:  08/15/2019    SURGEON:  Ciro Ferguson MD    ASSISTANT:  ASHANTI Adhikari    ANESTHESIOLOGIST:  iMck Pierre MD    TYPE OF ANESTHETIC:  General endotracheal anesthesia plus local 0.5% Marcaine   with epinephrine.    PREOPERATIVE DIAGNOSIS:  Right inguinal hernia.    POSTOPERATIVE DIAGNOSIS:  Right direct inguinal hernia.    PROCEDURE:  Repair of right direct inguinal hernia with polypropylene mesh.    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  Options were discussed and he elected to   proceed with an open repair.  This was performed today using a modified   Carley technique using polypropylene mesh.  He did have a chronic direct   inguinal hernia on the right side.  There were no apparent complications with   the repair.    FINDINGS AND PROCEDURE:  The patient was brought to the operating room and   placed on table in supine position.  General anesthetic was then provided by   the anesthesiologist, Dr. Pierre.  The right groin area was shaved, prepped and   draped in the usual sterile fashion.  Marcaine 0.5% with epinephrine was then   used for local anesthesia.  A time-out was called.  The correct patient,   correct diagnosis, correct surgery, and correct location of the surgery were   discussed and agreed upon.  He did receive preoperative IV antibiotics.  An   incision was made directly over the right inguinal canal with #15 blade   through the skin and subcutaneous tissue.  All bleeding was controlled with   electrocautery.  Dissection was then carried down through the subcutaneous   tissue until the external oblique aponeurosis was identified.  This structure   was opened along the direction of the fibers through the external ring.    Dissection beneath this structure revealed a large hernia coming through the   floor of the inguinal canal on the right side.  The ilioinguinal nerve was   densely adherent to  this hernia sac and therefore, it was decided to transect   the nerve as high as possible, as low as possible and the ends were tied with   3-0 Vicryl ties.  Next, the hernia sac was carefully dissected away from the   vas deferens and cord structures.  The hernia sac was quite thick and was not   opened.  It was carefully dissected away from the cord structures and traced   back to its origin at the floor of the inguinal canal.  It was re-invaginated   back into the peritoneal space and attenuated fibers of the transversalis   fascia were closed using running 3-0 Vicryl suture.  This was done to improve   the exposure for the repair.  A 3x5 inch piece of polypropylene mesh was then   cut to fit the confines of the inguinal space.  Lateral edge of the mesh was   sutured to the shelving portion of the inguinal ligament with a running 0   Prolene suture beginning at the pubic tubercle ending about 1 cm above the   internal ring.  Likewise, the medial edge of the mesh was sutured to the   internal oblique fascia with a running 0 Prolene suture beginning at the pubic   tubercle ending about 1 cm above the internal ring.  A slit was then made in   the upper portion of the mesh, which created 2 tails in the mesh.  The medial   tail was then crossed behind the spermatic cord and sutured lateral tail and   to the shelving portion of the inguinal ligament in 2 locations with 0 Prolene   suture.  This provided a new internal ring and the mesh easily covered the   entire floor of the inguinal canal.  The edges of the mesh were tucked   underneath the external oblique aponeurosis.  The external oblique was then   closed using running 3-0 Vicryl suture loosely reapproximating the external   ring.  Next, both Delmy's fascia and the dermis were closed with running 3-0   Vicryl suture.  The skin was closed using running 4-0 Monocryl suture in   subcuticular fashion.  Steri-Strips and sterile dressing were applied.  The   patient  did tolerate procedure well with no complications.  Final sponge and   needle count were correct.  He was then transferred back to recovery room for   further postoperative care.       ____________________________________     MD LOLA KING / KEITH    DD:  08/16/2019 10:10:26  DT:  08/16/2019 11:09:23    D#:  [id number]  Job#:  [id number]    cc: Virginia Morales MD

## 2019-08-19 ENCOUNTER — HOSPITAL ENCOUNTER (OUTPATIENT)
Dept: CARDIOLOGY | Facility: MEDICAL CENTER | Age: 84
End: 2019-08-19
Attending: INTERNAL MEDICINE
Payer: MEDICARE

## 2019-08-19 DIAGNOSIS — R06.02 SOB (SHORTNESS OF BREATH): ICD-10-CM

## 2019-08-19 DIAGNOSIS — G47.33 OSA (OBSTRUCTIVE SLEEP APNEA): ICD-10-CM

## 2019-08-19 PROCEDURE — 93306 TTE W/DOPPLER COMPLETE: CPT | Mod: 26 | Performed by: INTERNAL MEDICINE

## 2019-08-19 PROCEDURE — 93306 TTE W/DOPPLER COMPLETE: CPT

## 2019-08-20 LAB
LV EJECT FRACT  99904: 65
LV EJECT FRACT MOD 2C 99903: 67.22
LV EJECT FRACT MOD 4C 99902: 58.5
LV EJECT FRACT MOD BP 99901: 60.53

## 2019-09-03 NOTE — PROCEDURE: MOHS SURGERY
[facility] Call Center    Phone Message    May a detailed message be left on voicemail: yes    Reason for Call: Medication Refill Request    Has the patient contacted the pharmacy for the refill? Yes   Name of medication being requested: bupropion, hydroxyzine  Provider who prescribed the medication: Nelson Waddell  Date medication is needed: ASAP       Patient called to schedule follow up with Nelson Waddell.  Patient states her insurance is now active and she would like to schedule with Nelson Waddell on 9/5 in the PM.  No openings are currently available during requested time for this provider.  Writer suggested scheduling further out, but patient would prefer to be seen sooner so that prescription can be filled as soon as possible.      Other: Appointment/Scheduling Request    Patient requested to see any available provider 9/5 PM. Writer suggested checking with provider to see if prescription can be filled before patient is seen.  No appointment has been scheduled at this time.    Action Taken: Message routed to:  Other: Psychiatry Nurse Pool       Post-Care Instructions: I reviewed with the patient in detail post-care instructions. Patient is not to engage in any heavy lifting, exercise, or swimming for the next 14 days. Should the patient develop any fevers, chills, bleeding, severe pain patient will contact the office immediately.

## 2019-10-03 NOTE — PROGRESS NOTES
"Endocrinology Clinic Progress Note  PCP: Virginia Morales M.D.    HPI:  Juanito Marquez is a 84 y.o. old patient who comes in today for review of his endocrine problems.   1. Hypothyroid   Currently on Levothyroxine 100 mcg per day.     States compliance with taking on empty stomach and at least 30 minutes prior to food or other meds.      Ref. Range 2/13/2019 09:19   TSH Latest Ref Range: 0.450 - 4.500 uIU/mL 0.020 (L)   Free T-4 Latest Ref Range: 0.82 - 1.77 ng/dL 1.86 (H)   T3 Latest Ref Range: 71 - 180 ng/dL 101   T3,Free Latest Ref Range: 2.0 - 4.4 pg/mL 3.0     2. Vitamin D deficiency   Currently on Vitamin D 2000 iu per day  3. Type 2 diabetes, uncontrolled with hyperglycemia    Most Recent HbA1c:   Lab Results   Component Value Date/Time    HBA1C 6.5 (A) 10/07/2019 08:50 AM      Previous A1c was 6.7 on 6/3/2019  Current Diabetes Regimen:  Glimepiride 1 mg bid  Bydureon 2 mg weekly  Jardiance 10 mg per day      Patient has been testing blood sugars 1 times per day.   120-130 range fasting   Hypoglycemia:  None    Exercise: walking about 2 miles per day or playing golf  Diet: \"healthy\" diet  in general  Last Retinal Exam: states completed in July   Daily Foot Exam: yes         ROS:  Constitutional: No weight loss  Cardiac: No palpitations or racing heart  Resp: No shortness of breath  Neuro: No numbness or tinging in feet  Endo: No heat or cold intolerance, no polyuria or polydipsia  All other systems were reviewed and were negative.    Past Medical History:  Patient Active Problem List    Diagnosis Date Noted   • Type 2 diabetes mellitus, controlled ([facility]) 10/14/2015   • Abnormal thyroid exam 06/30/2014   • Keratosis, actinic 06/03/2014   • S/P CABG (coronary artery bypass graft) 06/27/2013   • Hypothyroidism 07/19/2012   • Renal insufficiency 08/08/2011   • Dyslipidemia    • Essential hypertension, benign    • Coronary artery disease due to lipid rich plaque    • Back pain 06/23/2009       Past Surgical " History:  Past Surgical History:   Procedure Laterality Date   • INGUINAL HERNIA REPAIR Right 8/15/2019    Procedure: REPAIR, HERNIA, INGUINAL;  Surgeon: Ciro Ferguson M.D.;  Location: SURGERY [facility];  Service: General   • BASAL CELL EXCISION Right 7/2/2019    Procedure: EXCISION, CARCINOMA, BASAL CELL- FOR SQUAMOUS CELL DISTAL PRETIBIAL REGION;  Surgeon: Larry Tobin M.D.;  Location: SURGERY [facility];  Service: Plastics   • SPLIT THICKNESS SKIN GRAFT Right 7/2/2019    Procedure: APPLICATION, GRAFT, SKIN, SPLIT-THICKNESS;  Surgeon: Larry Tobin M.D.;  Location: SURGERY [facility];  Service: Plastics   • CATARACT PHACO WITH IOL Bilateral 2016   • COLONOSCOPY  2014   • LUMBAR FUSION POSTERIOR  2008    L2-L3, L4-L5   • MULTIPLE CORONARY ARTERY BYPASS  1994    6 vessel   • CERVICAL DISK AND FUSION ANTERIOR  1987   • VASECTOMY  1980    Reversal   • TONSILLECTOMY  1955   • COLECTOMY  early 2004    right FOR BENIGN ADENOMA with cholecystectomy       Allergies:  Patient has no known allergies.    Social History:  Social History     Socioeconomic History   • Marital status:      Spouse name: Not on file   • Number of children: Not on file   • Years of education: Not on file   • Highest education level: Not on file   Occupational History   • Not on file   Social Needs   • Financial resource strain: Not on file   • Food insecurity:     Worry: Not on file     Inability: Not on file   • Transportation needs:     Medical: Not on file     Non-medical: Not on file   Tobacco Use   • Smoking status: Never Smoker   • Smokeless tobacco: Never Used   Substance and Sexual Activity   • Alcohol use: Yes     Alcohol/week: 2.4 oz     Types: 4 Glasses of wine per week     Drinks per session: 1 or 2     Comment: 1 per day   • Drug use: No   • Sexual activity: Not Currently   Lifestyle   • Physical activity:     Days per week: Not on file     Minutes per session: Not on file   • Stress: Not on file    Relationships   • Social connections:     Talks on phone: Not on file     Gets together: Not on file     Attends Zoroastrian service: Not on file     Active member of club or organization: Not on file     Attends meetings of clubs or organizations: Not on file     Relationship status: Not on file   • Intimate partner violence:     Fear of current or ex partner: Not on file     Emotionally abused: Not on file     Physically abused: Not on file     Forced sexual activity: Not on file   Other Topics Concern   • Not on file   Social History Narrative   • Not on file       Family History:  Family History   Problem Relation Age of Onset   • Cancer Mother         breast   • Heart Disease Mother    • Diabetes Father    • Cancer Father         bladder   • Other Father         ruptured appendix   • Diabetes Paternal Grandfather        Medications:    Current Outpatient Medications:   •  glimepiride (AMARYL) 1 MG tablet, Take 1 mg by mouth 2 times a day., Disp: , Rfl:   •  olmesartan (BENICAR) 40 MG Tab, Take 20 mg by mouth every evening., Disp: , Rfl:   •  triamcinolone (NASACORT ALLERGY 24HR) 55 MCG/ACT nasal inhaler, Spray 2 Sprays in nose every day., Disp: , Rfl:   •  Coenzyme Q10 (CO Q10) 100 MG Cap, Take 2 Caps by mouth 2 Times a Day., Disp: , Rfl:   •  Empagliflozin 10 MG Tab, Take 1 tablet by mouth every morning with breakfast., Disp: 90 Tab, Rfl: 3  •  Exenatide ER (BYDUREON) 2 MG Pen-injector, Inject 2 mg as instructed every 7 days., Disp: 12 Each, Rfl: 3  •  levothyroxine (SYNTHROID) 100 MCG Tab, Take 1 Tab by mouth Every morning on an empty stomach., Disp: 90 Tab, Rfl: 3  •  niacinamide 500 MG tablet, Take 500 mg by mouth 2 Times a Day., Disp: , Rfl:   •  rosuvastatin (CRESTOR) 10 MG Tab, Take 1 Tab by mouth every evening., Disp: 90 Tab, Rfl: 3  •  Ascorbic Acid (VITAMIN C) 1000 MG Tab, Take 1,000 mg by mouth every day., Disp: , Rfl:   •  Omega-3 Fatty Acids (FISH OIL) 1200 MG CAPS, Take 2 Caps by mouth every day.,  "Disp: , Rfl:   •  Cholecalciferol (VITAMIN D) 2000 UNIT TABS, Take 1 Tab by mouth every day., Disp: , Rfl:   •  aspirin 81 MG tablet, Take 81 mg by mouth every day., Disp: , Rfl:     Labs: Reviewed    Physical Examination:  Vital signs: BP (!) 94/64 (BP Location: Left arm, Patient Position: Sitting, BP Cuff Size: Adult)   Ht 1.778 m (5' 10\")   Wt 70.4 kg (155 lb 3.2 oz)   SpO2 98%   BMI 22.27 kg/m²  Body mass index is 22.27 kg/m².  General: No apparent distress, cooperative  Eyes: No scleral icterus or discharge  ENMT: Normal on external inspection of nose, lips, normal thyroid exam  Neck: No abnormal masses on inspection  Resp: Normal effort, clear to auscultation bilaterally   CVS: Regular rate and rhythm, S1 S2 normal, no murmur   Extremities: No edema  Abdomen: abdominal obesity present  Neuro: Alert and oriented  Skin: No rash  Psych: Normal mood and affect, intact memory and able to make informed decisions    Assessment and Plan:  1. Acquired hypothyroidism  Continue current dose of levothyroxine    2. Vitamin D deficiency  continue vitamin D supplementation    3. Uncontrolled type 2 diabetes mellitus with hyperglycemia (HCC)  Controlled.  To new current regimen  - Discussed diabetic diet, encouraged portion control.   - Discussed importance of testing blood sugars and keeping logs.   - Discussed importance of daily exercise, recommended 30 minutes per day  - Reviewed medications and advised how to take.  - Discussed importance of immunizations and yearly eye exams.   - Advised daily foot  exams. Educated on signs of infection.   - Educated on need to stay well hydrated with water.  - Educated to call with any questions or problems.    And forgot to get labs done.  He will get it done tomorrow and will call the office to let us know    Return in about 4 months (around 2/7/2020).    Thank you for allowing me to participate in the care of this patient.    Darrion Mcbride M.D.  10/03/19    CC:   Virginia FOX" SINGH Morales.    This note was created using voice recognition software (Dragon). The accuracy of the dictation is limited by the abilities of the software. I have reviewed the note prior to signing, however some errors in grammar and context are still possible. If you have any questions related to this note please do not hesitate to contact our office.   This note was scribed by Cassie Bowden RN, CDE

## 2019-10-07 ENCOUNTER — OFFICE VISIT (OUTPATIENT)
Dept: ENDOCRINOLOGY | Facility: MEDICAL CENTER | Age: 84
End: 2019-10-07
Payer: MEDICARE

## 2019-10-07 VITALS
BODY MASS INDEX: 22.22 KG/M2 | HEIGHT: 70 IN | OXYGEN SATURATION: 98 % | SYSTOLIC BLOOD PRESSURE: 94 MMHG | DIASTOLIC BLOOD PRESSURE: 64 MMHG | WEIGHT: 155.2 LBS

## 2019-10-07 DIAGNOSIS — E03.9 ACQUIRED HYPOTHYROIDISM: ICD-10-CM

## 2019-10-07 DIAGNOSIS — E55.9 VITAMIN D DEFICIENCY: ICD-10-CM

## 2019-10-07 DIAGNOSIS — E11.65 UNCONTROLLED TYPE 2 DIABETES MELLITUS WITH HYPERGLYCEMIA (HCC): ICD-10-CM

## 2019-10-07 LAB
HBA1C MFR BLD: 6.5 % (ref 0–5.6)
INT CON NEG: ABNORMAL
INT CON POS: ABNORMAL

## 2019-10-07 PROCEDURE — 99214 OFFICE O/P EST MOD 30 MIN: CPT | Performed by: INTERNAL MEDICINE

## 2019-10-07 PROCEDURE — 83036 HEMOGLOBIN GLYCOSYLATED A1C: CPT | Performed by: INTERNAL MEDICINE

## 2019-10-09 LAB
25(OH)D3+25(OH)D2 SERPL-MCNC: 39.2 NG/ML (ref 30–100)
ALBUMIN/CREAT UR: 8.5 MG/G CREAT (ref 0–30)
BUN SERPL-MCNC: 29 MG/DL (ref 8–27)
BUN/CREAT SERPL: 15 (ref 10–24)
CALCIUM SERPL-MCNC: 9.5 MG/DL (ref 8.6–10.2)
CHLORIDE SERPL-SCNC: 109 MMOL/L (ref 96–106)
CO2 SERPL-SCNC: 19 MMOL/L (ref 20–29)
CREAT SERPL-MCNC: 1.96 MG/DL (ref 0.76–1.27)
CREAT UR-MCNC: 119.3 MG/DL
GLUCOSE SERPL-MCNC: 150 MG/DL (ref 65–99)
MICROALBUMIN UR-MCNC: 10.1 UG/ML
POTASSIUM SERPL-SCNC: 4.7 MMOL/L (ref 3.5–5.2)
SODIUM SERPL-SCNC: 144 MMOL/L (ref 134–144)
T3 SERPL-MCNC: 99 NG/DL (ref 71–180)
T3FREE SERPL-MCNC: 2.7 PG/ML (ref 2–4.4)
T4 FREE SERPL-MCNC: 1.7 NG/DL (ref 0.82–1.77)
TSH SERPL DL<=0.005 MIU/L-ACNC: 0.05 UIU/ML (ref 0.45–4.5)
VIT B12 SERPL-MCNC: 343 PG/ML (ref 232–1245)

## 2019-11-07 ENCOUNTER — APPOINTMENT (OUTPATIENT)
Age: 84
Setting detail: DERMATOLOGY
End: 2019-11-08

## 2019-11-07 DIAGNOSIS — Z86.007 PERSONAL HISTORY OF IN-SITU NEOPLASM OF SKIN: ICD-10-CM

## 2019-11-07 DIAGNOSIS — Z71.89 OTHER SPECIFIED COUNSELING: ICD-10-CM

## 2019-11-07 DIAGNOSIS — Z86.006 PERSONAL HISTORY OF MELANOMA IN-SITU: ICD-10-CM

## 2019-11-07 DIAGNOSIS — L57.0 ACTINIC KERATOSIS: ICD-10-CM

## 2019-11-07 DIAGNOSIS — D18.0 HEMANGIOMA: ICD-10-CM

## 2019-11-07 DIAGNOSIS — L82.1 OTHER SEBORRHEIC KERATOSIS: ICD-10-CM

## 2019-11-07 DIAGNOSIS — Z85.828 PERSONAL HISTORY OF OTHER MALIGNANT NEOPLASM OF SKIN: ICD-10-CM

## 2019-11-07 DIAGNOSIS — L57.8 OTHER SKIN CHANGES DUE TO CHRONIC EXPOSURE TO NONIONIZING RADIATION: ICD-10-CM

## 2019-11-07 PROBLEM — D18.01 HEMANGIOMA OF SKIN AND SUBCUTANEOUS TISSUE: Status: ACTIVE | Noted: 2019-11-07

## 2019-11-07 PROBLEM — Z85.820 PERSONAL HISTORY OF MALIGNANT MELANOMA OF SKIN: Status: ACTIVE | Noted: 2019-11-07

## 2019-11-07 PROCEDURE — 99213 OFFICE O/P EST LOW 20 MIN: CPT | Mod: 25

## 2019-11-07 PROCEDURE — OTHER TREATMENT REGIMEN: OTHER

## 2019-11-07 PROCEDURE — OTHER LIQUID NITROGEN: OTHER

## 2019-11-07 PROCEDURE — OTHER OTHER: OTHER

## 2019-11-07 PROCEDURE — OTHER COUNSELING: OTHER

## 2019-11-07 PROCEDURE — 17004 DESTROY PREMAL LESIONS 15/>: CPT

## 2019-11-07 ASSESSMENT — LOCATION ZONE DERM
LOCATION ZONE: TRUNK
LOCATION ZONE: SCALP
LOCATION ZONE: NOSE
LOCATION ZONE: HAND
LOCATION ZONE: ARM
LOCATION ZONE: LEG
LOCATION ZONE: FACE

## 2019-11-07 ASSESSMENT — LOCATION DETAILED DESCRIPTION DERM
LOCATION DETAILED: LEFT INFERIOR LATERAL MALAR CHEEK
LOCATION DETAILED: RIGHT DISTAL DORSAL FOREARM
LOCATION DETAILED: RIGHT MEDIAL PROXIMAL PRETIBIAL REGION
LOCATION DETAILED: RIGHT PROXIMAL DORSAL FOREARM
LOCATION DETAILED: LEFT MEDIAL FRONTAL SCALP
LOCATION DETAILED: RIGHT LATERAL ZYGOMA
LOCATION DETAILED: LEFT PROXIMAL ULNAR DORSAL FOREARM
LOCATION DETAILED: RIGHT SUPERIOR PREAURICULAR CHEEK
LOCATION DETAILED: LEFT INFERIOR MEDIAL UPPER BACK
LOCATION DETAILED: LEFT CENTRAL FRONTAL SCALP
LOCATION DETAILED: LEFT INFERIOR MEDIAL FOREHEAD
LOCATION DETAILED: RIGHT PROXIMAL PRETIBIAL REGION
LOCATION DETAILED: RIGHT SUPERIOR LATERAL LOWER BACK
LOCATION DETAILED: LEFT PROXIMAL DORSAL FOREARM
LOCATION DETAILED: LEFT CENTRAL PARIETAL SCALP
LOCATION DETAILED: LEFT PROXIMAL PRETIBIAL REGION
LOCATION DETAILED: LEFT DISTAL DORSAL FOREARM
LOCATION DETAILED: RIGHT RADIAL DORSAL HAND
LOCATION DETAILED: LEFT MEDIAL UPPER BACK
LOCATION DETAILED: NASAL DORSUM
LOCATION DETAILED: RIGHT LATERAL FOREHEAD
LOCATION DETAILED: LEFT RADIAL DORSAL HAND
LOCATION DETAILED: RIGHT SUPERIOR PARIETAL SCALP
LOCATION DETAILED: EPIGASTRIC SKIN
LOCATION DETAILED: RIGHT LATERAL MALAR CHEEK
LOCATION DETAILED: STERNUM
LOCATION DETAILED: RIGHT CENTRAL POSTAURICULAR SKIN
LOCATION DETAILED: LEFT SUPERIOR LATERAL MALAR CHEEK
LOCATION DETAILED: RIGHT CENTRAL ZYGOMA
LOCATION DETAILED: RIGHT CENTRAL PARIETAL SCALP
LOCATION DETAILED: RIGHT CENTRAL MALAR CHEEK
LOCATION DETAILED: LEFT SUPERIOR PARIETAL SCALP
LOCATION DETAILED: RIGHT SUPERIOR MEDIAL FOREHEAD
LOCATION DETAILED: RIGHT ULNAR DORSAL HAND
LOCATION DETAILED: LEFT ULNAR DORSAL HAND

## 2019-11-07 ASSESSMENT — LOCATION SIMPLE DESCRIPTION DERM
LOCATION SIMPLE: RIGHT LOWER BACK
LOCATION SIMPLE: RIGHT HAND
LOCATION SIMPLE: NOSE
LOCATION SIMPLE: RIGHT PRETIBIAL REGION
LOCATION SIMPLE: LEFT HAND
LOCATION SIMPLE: RIGHT CHEEK
LOCATION SIMPLE: RIGHT FOREARM
LOCATION SIMPLE: LEFT UPPER BACK
LOCATION SIMPLE: LEFT PRETIBIAL REGION
LOCATION SIMPLE: RIGHT ZYGOMA
LOCATION SIMPLE: LEFT FOREARM
LOCATION SIMPLE: RIGHT FOREHEAD
LOCATION SIMPLE: LEFT FOREHEAD
LOCATION SIMPLE: LEFT SCALP
LOCATION SIMPLE: SCALP
LOCATION SIMPLE: CHEST
LOCATION SIMPLE: ABDOMEN
LOCATION SIMPLE: LEFT CHEEK

## 2019-11-07 NOTE — PROCEDURE: LIQUID NITROGEN
Detail Level: Simple
Consent: The patient's written consent was obtained including but not limited to risks of crusting, scabbing, blistering, scarring, darker or lighter pigmentary change, recurrence, incomplete removal and infection.
Render Note In Bullet Format When Appropriate: No
Post-Care Instructions: I reviewed with the patient in detail post-care instructions. Patient is to wear sunprotection, and avoid picking at any of the treated lesions. Pt may apply Vaseline to crusted or scabbing areas.  Written consent was obtained.
Render Post-Care Instructions In Note?: yes
Duration Of Freeze Thaw-Cycle (Seconds): 0

## 2020-02-06 ENCOUNTER — APPOINTMENT (OUTPATIENT)
Age: 85
Setting detail: DERMATOLOGY
End: 2020-02-06

## 2020-02-06 DIAGNOSIS — D485 NEOPLASM OF UNCERTAIN BEHAVIOR OF SKIN: ICD-10-CM

## 2020-02-06 DIAGNOSIS — L57.8 OTHER SKIN CHANGES DUE TO CHRONIC EXPOSURE TO NONIONIZING RADIATION: ICD-10-CM

## 2020-02-06 DIAGNOSIS — Z09 ENCOUNTER FOR FOLLOW-UP EXAMINATION AFTER COMPLETED TREATMENT FOR CONDITIONS OTHER THAN MALIGNANT NEOPLASM: ICD-10-CM

## 2020-02-06 DIAGNOSIS — L57.0 ACTINIC KERATOSIS: ICD-10-CM

## 2020-02-06 PROBLEM — D48.5 NEOPLASM OF UNCERTAIN BEHAVIOR OF SKIN: Status: ACTIVE | Noted: 2020-02-06

## 2020-02-06 PROCEDURE — 99213 OFFICE O/P EST LOW 20 MIN: CPT | Mod: 25

## 2020-02-06 PROCEDURE — OTHER OTHER: OTHER

## 2020-02-06 PROCEDURE — OTHER COUNSELING: OTHER

## 2020-02-06 PROCEDURE — OTHER PHOTODYNAMIC THERAPY COUNSELING: OTHER

## 2020-02-06 PROCEDURE — 11102 TANGNTL BX SKIN SINGLE LES: CPT

## 2020-02-06 PROCEDURE — OTHER MEDICATION COUNSELING: OTHER

## 2020-02-06 PROCEDURE — OTHER BIOPSY BY SHAVE METHOD: OTHER

## 2020-02-06 PROCEDURE — 17003 DESTRUCT PREMALG LES 2-14: CPT

## 2020-02-06 PROCEDURE — OTHER LIQUID NITROGEN: OTHER

## 2020-02-06 PROCEDURE — 17000 DESTRUCT PREMALG LESION: CPT | Mod: 59

## 2020-02-06 ASSESSMENT — LOCATION DETAILED DESCRIPTION DERM
LOCATION DETAILED: LEFT CENTRAL MALAR CHEEK
LOCATION DETAILED: RIGHT LATERAL INFERIOR CHEST
LOCATION DETAILED: RIGHT RADIAL DORSAL HAND
LOCATION DETAILED: LEFT PROXIMAL DORSAL FOREARM
LOCATION DETAILED: LEFT ULNAR DORSAL HAND
LOCATION DETAILED: LEFT RADIAL DORSAL HAND
LOCATION DETAILED: LEFT SUPERIOR PARIETAL SCALP

## 2020-02-06 ASSESSMENT — LOCATION ZONE DERM
LOCATION ZONE: ARM
LOCATION ZONE: HAND
LOCATION ZONE: FACE
LOCATION ZONE: SCALP
LOCATION ZONE: TRUNK

## 2020-02-06 ASSESSMENT — LOCATION SIMPLE DESCRIPTION DERM
LOCATION SIMPLE: SCALP
LOCATION SIMPLE: LEFT HAND
LOCATION SIMPLE: LEFT CHEEK
LOCATION SIMPLE: LEFT FOREARM
LOCATION SIMPLE: CHEST
LOCATION SIMPLE: RIGHT HAND

## 2020-02-06 NOTE — PROCEDURE: MEDICATION COUNSELING
Xelbinz Pregnancy And Lactation Text: This medication is Pregnancy Category D and is not considered safe during pregnancy.  The risk during breast feeding is also uncertain.

## 2020-02-06 NOTE — PROCEDURE: BIOPSY BY SHAVE METHOD
Was A Bandage Applied: Yes
Electrodesiccation Text: The wound bed was treated with electrodesiccation after the biopsy was performed.
Cryotherapy Text: The wound bed was treated with cryotherapy after the biopsy was performed.
Dressing: Band-Aid
Additional Anesthesia Volume In Cc (Will Not Render If 0): 0
Hide Accession Number?: No
Hemostasis: Drysol
Detail Level: Detailed
Depth Of Biopsy: dermis
Wound Care: Vaseline
Electrodesiccation And Curettage Text: The wound bed was treated with electrodesiccation and curettage after the biopsy was performed.
Billing Type: Third-Party Bill
Post-Care Instructions: I reviewed with the patient in detail post-care instructions. Patient is to keep the biopsy site dry overnight, and then apply vaseline twice daily until healed. Patient may apply hydrogen peroxide soaks to remove any crusting.
Silver Nitrate Text: The wound bed was treated with silver nitrate after the biopsy was performed.
Notification Instructions: Patient will be notified of biopsy results. However, patient instructed to call the office if not contacted within 2 weeks.
Biopsy Type: H and E
Anesthesia Volume In Cc (Will Not Render If 0): 0.5
Anesthesia Type: 1% lidocaine with epinephrine and a 1:10 solution of 8.4% sodium bicarbonate
Biopsy Method: double edge Personna blade
Type Of Destruction Used: Electrodesiccation and Curettage
Consent: Written consent was obtained and risks were reviewed including but not limited to scarring, infection, bleeding, scabbing, incomplete removal, nerve damage and allergy to anesthesia.

## 2020-02-06 NOTE — PROCEDURE: OTHER
Note Text (......Xxx Chief Complaint.): This diagnosis correlates with the
Other (Free Text): Ak’s much improved s/p 5FU tx. Pt would like to try PDT in the future.
Detail Level: Detailed

## 2020-02-06 NOTE — PROCEDURE: LIQUID NITROGEN
Render Post-Care Instructions In Note?: yes
Consent: The patient's written consent was obtained including but not limited to risks of crusting, scabbing, blistering, scarring, darker or lighter pigmentary change, recurrence, incomplete removal and infection.
Render Note In Bullet Format When Appropriate: No
Detail Level: Simple
Post-Care Instructions: I reviewed with the patient in detail post-care instructions. Patient is to wear sunprotection, and avoid picking at any of the treated lesions. Pt may apply Vaseline to crusted or scabbing areas.  Written consent was obtained.

## 2020-03-08 NOTE — PROCEDURE: REPAIR NOTE
Assessment  DMT2: 58y Male with DM T2 with hyperglycemia, A1C 10.4%, on basal bolus insulin, FS within overall acceptable range, no hypoglycemic episodes. Patient is eating full meals, alert and comfortable, no complaints, no acute events. Tentative DC date 3/10 to [place]  CAD: on medications, no chest pain, stable, monitored.  HTN: Controlled,  on antihypertensive medications.  Overweight/Obesity: No strict exercise routines, not on any weight loss program,  neither on low  calorie diet.          Laury Romero MD  Cell: [phone]  Office: [phone] Assessment  DMT2: 58y Male with DM T2 with hyperglycemia, A1C 10.4%, on basal bolus insulin, FS within overall acceptable range,  no hypoglycemic episodes. Patient is eating full meals, alert and comfortable, no complaints, no acute events. Tentative DC date 3/10 to [facility] [place]  CAD: on medications, no chest pain, stable, monitored.  HTN: Controlled,  on antihypertensive medications.  Overweight/Obesity: No strict exercise routines, not on any weight loss program,  neither on low  calorie diet.          Laury Romero MD  Cell: [phone]  Office: [phone] Localized Dermabrasion Text: The patient was draped in routine manner.  Localized dermabrasion using 3 x 17 mm wire brush was performed in routine manner to papillary dermis. This spot dermabrasion is being performed to complete skin cancer reconstruction. It also will eliminate the other sun damaged precancerous cells that are known to be part of the regional effect of a lifetime's worth of sun exposure. This localized dermabrasion is therapeutic and should not be considered cosmetic in any regard. Localized Dermabrasion With Wire Brush Text: The patient was draped in routine manner.  Localized dermabrasion using 3 x 17 mm wire brush was performed in routine manner to papillary dermis. This spot dermabrasion is being performed to complete skin cancer reconstruction. It also will eliminate the other sun damaged precancerous cells that are known to be part of the regional effect of a lifetime's worth of sun exposure. This localized dermabrasion is therapeutic and should not be considered cosmetic in any regard.

## 2020-04-03 ENCOUNTER — APPOINTMENT (OUTPATIENT)
Age: 85
Setting detail: DERMATOLOGY
End: 2020-04-06

## 2020-04-03 DIAGNOSIS — Z86.006 PERSONAL HISTORY OF MELANOMA IN-SITU: ICD-10-CM

## 2020-04-03 DIAGNOSIS — D485 NEOPLASM OF UNCERTAIN BEHAVIOR OF SKIN: ICD-10-CM

## 2020-04-03 PROBLEM — D48.5 NEOPLASM OF UNCERTAIN BEHAVIOR OF SKIN: Status: ACTIVE | Noted: 2020-04-03

## 2020-04-03 PROBLEM — Z85.820 PERSONAL HISTORY OF MALIGNANT MELANOMA OF SKIN: Status: ACTIVE | Noted: 2020-04-03

## 2020-04-03 PROBLEM — D04.5 CARCINOMA IN SITU OF SKIN OF TRUNK: Status: ACTIVE | Noted: 2020-04-03

## 2020-04-03 PROCEDURE — OTHER CURETTAGE AND DESTRUCTION: OTHER

## 2020-04-03 PROCEDURE — OTHER COUNSELING: OTHER

## 2020-04-03 PROCEDURE — 17262 DSTRJ MAL LES T/A/L 1.1-2.0: CPT

## 2020-04-03 PROCEDURE — 99213 OFFICE O/P EST LOW 20 MIN: CPT | Mod: 25

## 2020-04-03 PROCEDURE — OTHER BIOPSY BY SHAVE METHOD: OTHER

## 2020-04-03 PROCEDURE — 11102 TANGNTL BX SKIN SINGLE LES: CPT | Mod: 59

## 2020-04-03 ASSESSMENT — LOCATION SIMPLE DESCRIPTION DERM
LOCATION SIMPLE: RIGHT TEMPLE
LOCATION SIMPLE: RIGHT CHEEK

## 2020-04-03 ASSESSMENT — LOCATION ZONE DERM: LOCATION ZONE: FACE

## 2020-04-03 ASSESSMENT — LOCATION DETAILED DESCRIPTION DERM
LOCATION DETAILED: RIGHT CENTRAL TEMPLE
LOCATION DETAILED: RIGHT SUPERIOR CENTRAL MALAR CHEEK

## 2020-04-03 NOTE — PROCEDURE: CURETTAGE AND DESTRUCTION
Post-Care Instructions: I reviewed with the patient in detail post-care instructions. Patient is to keep the area dry for 48 hours, and not to engage in any swimming until the area is healed. Should the patient develop any fevers, chills, bleeding, severe pain patient will contact the office immediately.
Total Volume (Ccs): 1
Cautery Type: electrodesiccation
Bill As A Line Item Or As Units: Line Item
Size Of Lesion After Curettage: 1.2
Add Ability To Document Additional Intralesional Injection: No
Consent: Written Consent was obtained from the patient. The risks, benefits and alternatives to therapy were discussed in detail. Specifically, the risks of infection, scarring, bleeding, prolonged wound healing, nerve injury, incomplete removal, allergy to anesthesia and recurrence were addressed. Alternatives to ED&C, such as: surgical removal and XRT were also discussed.  Prior to the procedure, the treatment site was clearly identified and confirmed by the patient. All components of Universal Protocol/PAUSE Rule completed.
Biopsy Photograph Reviewed: Yes
What Was Performed First?: Curettage
Number Of Curettages: 3
Anesthesia Type: 1% lidocaine without epinephrine and a 1:10 solution of 8.4% sodium bicarbonate
Additional Information: (Optional): The wound was cleaned, and a pressure dressing was applied.  The patient received detailed post-op instructions.
Size Of Lesion In Cm: 0.8
Detail Level: Detailed
Concentration (Mg/Ml Or Millions Of Plaque Forming Units/Cc): 0.01

## 2020-04-03 NOTE — PROCEDURE: BIOPSY BY SHAVE METHOD
Hide Additional Anticipated Plan?: No
Biopsy Method: double edge Personna blade
Information: Selecting Yes will display possible errors in your note based on the variables you have selected. This validation is only offered as a suggestion for you. PLEASE NOTE THAT THE VALIDATION TEXT WILL BE REMOVED WHEN YOU FINALIZE YOUR NOTE. IF YOU WANT TO FAX A PRELIMINARY NOTE YOU WILL NEED TO TOGGLE THIS TO 'NO' IF YOU DO NOT WANT IT IN YOUR FAXED NOTE.
Hemostasis: Drysol
Notification Instructions: Patient will be notified of biopsy results. However, patient instructed to call the office if not contacted within 2 weeks.
X Size Of Lesion In Cm: 0
Type Of Destruction Used: Electrodesiccation and Curettage
Anesthesia Volume In Cc (Will Not Render If 0): 0.5
Dressing: Band-Aid
Depth Of Biopsy: dermis
Wound Care: Vaseline
Electrodesiccation Text: The wound bed was treated with electrodesiccation after the biopsy was performed.
Cryotherapy Text: The wound bed was treated with cryotherapy after the biopsy was performed.
Consent: Written consent was obtained and risks were reviewed including but not limited to scarring, infection, bleeding, scabbing, incomplete removal, nerve damage and allergy to anesthesia.
Was A Bandage Applied: Yes
Post-Care Instructions: I reviewed with the patient in detail post-care instructions. Patient is to keep the biopsy site dry overnight, and then apply vaseline twice daily until healed. Patient may apply hydrogen peroxide soaks to remove any crusting.
Detail Level: Detailed
Silver Nitrate Text: The wound bed was treated with silver nitrate after the biopsy was performed.
Electrodesiccation And Curettage Text: The wound bed was treated with electrodesiccation and curettage after the biopsy was performed.
Biopsy Type: H and E
Anesthesia Type: 1% lidocaine with epinephrine and a 1:10 solution of 8.4% sodium bicarbonate
Billing Type: Third-Party Bill

## 2020-04-20 ENCOUNTER — APPOINTMENT (OUTPATIENT)
Age: 85
Setting detail: DERMATOLOGY
End: 2020-04-22

## 2020-04-20 ENCOUNTER — APPOINTMENT (OUTPATIENT)
Age: 85
Setting detail: DERMATOLOGY
End: 2020-04-21

## 2020-04-20 DIAGNOSIS — Z48.1 ENCOUNTER FOR PLANNED POSTPROCEDURAL WOUND CLOSURE: ICD-10-CM

## 2020-04-20 PROBLEM — C44.329 SQUAMOUS CELL CARCINOMA OF SKIN OF OTHER PARTS OF FACE: Status: ACTIVE | Noted: 2020-04-20

## 2020-04-20 PROCEDURE — OTHER OTHER: OTHER

## 2020-04-20 PROCEDURE — 17311 MOHS 1 STAGE H/N/HF/G: CPT

## 2020-04-20 PROCEDURE — OTHER COUNSELING: OTHER

## 2020-04-20 PROCEDURE — 17312 MOHS ADDL STAGE: CPT

## 2020-04-20 PROCEDURE — OTHER MOHS SURGERY: OTHER

## 2020-04-20 PROCEDURE — OTHER CONSULTATION FOR MOHS SURGERY: OTHER

## 2020-04-20 PROCEDURE — OTHER REPAIR NOTE: OTHER

## 2020-04-20 PROCEDURE — OTHER CONSULTATION FOR SURGICAL REPAIR: OTHER

## 2020-04-20 PROCEDURE — 14041 TIS TRNFR F/C/C/M/N/A/G/H/F: CPT

## 2020-04-20 PROCEDURE — 99213 OFFICE O/P EST LOW 20 MIN: CPT | Mod: 57

## 2020-04-20 ASSESSMENT — LOCATION SIMPLE DESCRIPTION DERM: LOCATION SIMPLE: RIGHT TEMPLE

## 2020-04-20 ASSESSMENT — LOCATION ZONE DERM: LOCATION ZONE: FACE

## 2020-04-20 ASSESSMENT — LOCATION DETAILED DESCRIPTION DERM: LOCATION DETAILED: RIGHT CENTRAL TEMPLE

## 2020-04-20 NOTE — PROCEDURE: CONSULTATION FOR MOHS SURGERY
Size Of Lesion: 1.9
X Size Of Lesion In Cm (Optional): 1.4
Incorporate Mauc In Note: No
Detail Level: Detailed

## 2020-04-20 NOTE — PROCEDURE: OTHER
Other (Free Text): present x 1 year\\nfirst stage revealed large anaplastic cells, diffuse.  some around blood vessels and one cluster near to a nerve.  we discussed risks of mets.  he verbalized understanding.  \\n\\nno cervical or occipital LAD palpated.\\n\\ni recommended pt see dr. mary lemos for imaging and eval for potential post mohs radiation.  he verbalized understanding.  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  \\n\\nf/u 3 months for fbse (if he is still in [place], he does go to [place] in summers, he has a dermatologist there.  he should f/u with derm in [place] in 3 months) Other (Free Text): present x 1 year\\nfirst stage revealed large anaplastic cells, diffuse.  some around blood vessels and one cluster near to a nerve.  we discussed risks of mets.  he verbalized understanding.  \\n\\nno cervical or occipital LAD palpated.\\n\\ni recommended pt see dr. mary lemos for imaging and eval for potential post mohs radiation.  he verbalized understanding.  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  \\n\\nf/u 3 months for fbse (if he is still in [place], he does go to [place] in summers, he has a dermatologist there.  he should f/u with derm in [place] in 3 months)

## 2020-04-20 NOTE — PROCEDURE: REPAIR NOTE
Advancement Flap (Single) Text: The defect edges were debeveled with a #15 scalpel blade.  Given the location of the defect and the proximity to free margins a single advancement flap was deemed most appropriate.  Using a sterile surgical marker, an appropriate advancement flap was drawn incorporating the defect and placing the expected incisions within the relaxed skin tension lines where possible.    The area thus outlined was incised deep to adipose tissue with a #15 scalpel blade.  The skin margins were undermined to an appropriate distance in all directions utilizing iris scissors. Healthy tissue was elevated and advanced to fill the defect under minimal tension.

## 2020-04-27 ENCOUNTER — TELEPHONE (OUTPATIENT)
Dept: ENDOCRINOLOGY | Facility: MEDICAL CENTER | Age: 85
End: 2020-04-27

## 2020-04-27 NOTE — TELEPHONE ENCOUNTER
1. Caller Name: Juanito Marquez                        Call Back Number: [phone]     Patient needs a PA on his [facility]    Pharmacy in [place]  [phone]

## 2020-04-29 NOTE — TELEPHONE ENCOUNTER
Submitted prior auth for this patient. Attempted to call him but there was no answer nor could I leave a voicemail message.

## 2020-06-19 ENCOUNTER — OFFICE VISIT (OUTPATIENT)
Dept: ENDOCRINOLOGY | Facility: MEDICAL CENTER | Age: 85
End: 2020-06-19
Payer: MEDICARE

## 2020-06-19 VITALS
DIASTOLIC BLOOD PRESSURE: 62 MMHG | HEIGHT: 72 IN | SYSTOLIC BLOOD PRESSURE: 118 MMHG | WEIGHT: 153 LBS | HEART RATE: 76 BPM | BODY MASS INDEX: 20.72 KG/M2 | OXYGEN SATURATION: 95 %

## 2020-06-19 DIAGNOSIS — E55.9 VITAMIN D DEFICIENCY: ICD-10-CM

## 2020-06-19 DIAGNOSIS — I10 ESSENTIAL HYPERTENSION, BENIGN: ICD-10-CM

## 2020-06-19 DIAGNOSIS — D50.8 OTHER IRON DEFICIENCY ANEMIA: ICD-10-CM

## 2020-06-19 DIAGNOSIS — E03.9 HYPOTHYROIDISM, UNSPECIFIED TYPE: ICD-10-CM

## 2020-06-19 DIAGNOSIS — E11.65 UNCONTROLLED TYPE 2 DIABETES MELLITUS WITH HYPERGLYCEMIA (HCC): ICD-10-CM

## 2020-06-19 DIAGNOSIS — E78.2 MIXED HYPERLIPIDEMIA: ICD-10-CM

## 2020-06-19 DIAGNOSIS — E53.8 VITAMIN B 12 DEFICIENCY: ICD-10-CM

## 2020-06-19 LAB
HBA1C MFR BLD: 6.3 % (ref 0–5.6)
INT CON NEG: NEGATIVE
INT CON POS: POSITIVE

## 2020-06-19 PROCEDURE — 36415 COLL VENOUS BLD VENIPUNCTURE: CPT | Performed by: INTERNAL MEDICINE

## 2020-06-19 PROCEDURE — 99214 OFFICE O/P EST MOD 30 MIN: CPT | Performed by: INTERNAL MEDICINE

## 2020-06-19 PROCEDURE — 83036 HEMOGLOBIN GLYCOSYLATED A1C: CPT | Performed by: INTERNAL MEDICINE

## 2020-06-19 RX ORDER — LANOLIN ALCOHOL/MO/W.PET/CERES
325 CREAM (GRAM) TOPICAL 2 TIMES DAILY
Qty: 180 TAB | Refills: 3 | Status: SHIPPED | OUTPATIENT
Start: 2020-06-19 | End: 2020-06-19 | Stop reason: SDUPTHER

## 2020-06-19 RX ORDER — LANOLIN ALCOHOL/MO/W.PET/CERES
325 CREAM (GRAM) TOPICAL 2 TIMES DAILY
Qty: 180 TAB | Refills: 3 | Status: SHIPPED | OUTPATIENT
Start: 2020-06-19 | End: 2022-09-28

## 2020-06-19 ASSESSMENT — FIBROSIS 4 INDEX: FIB4 SCORE: 2.56

## 2020-07-04 NOTE — PROGRESS NOTES
"Endocrinology Clinic Progress Note  PCP: Virginia Morales M.D.    HPI:  Juanito Marquez is a 84 y.o. old patient who comes in today for review of his endocrine problems.   1. Hypothyroid   Currently on Levothyroxine 100 mcg per day.     States compliance with taking on empty stomach and at least 30 minutes prior to food or other meds.     2. Vitamin D deficiency   Currently on Vitamin D 2000 iu per day  3. Type 2 diabetes, uncontrolled with hyperglycemia    Most Recent HbA1c:   Lab Results   Component Value Date/Time    HBA1C 6.3 (A) 06/19/2020 04:22 PM      Previous A1c was 6.7 on 6/3/2019  Current Diabetes Regimen:  Glimepiride 1 mg bid  Bydureon 2 mg weekly  Jardiance 10 mg per day      Patient has been testing blood sugars 1 times per day.     range fasting   Hypoglycemia:  None    Exercise: walking about 2 miles per day or playing golf  Diet: \"healthy\" diet  in general  Last Retinal Exam: states completed in July   Daily Foot Exam: yes     ROS:  Constitutional: No weight loss  Cardiac: No palpitations or racing heart  Resp: No shortness of breath  Neuro: No numbness or tinging in feet  Endo: No heat or cold intolerance, no polyuria or polydipsia  All other systems were reviewed and were negative.    Past Medical History:  Patient Active Problem List    Diagnosis Date Noted   • Type 2 diabetes mellitus, controlled ([facility]) 10/14/2015   • Abnormal thyroid exam 06/30/2014   • Keratosis, actinic 06/03/2014   • S/P CABG (coronary artery bypass graft) 06/27/2013   • Hypothyroidism 07/19/2012   • Renal insufficiency 08/08/2011   • Dyslipidemia    • Essential hypertension, benign    • Coronary artery disease due to lipid rich plaque    • Back pain 06/23/2009       Past Surgical History:  Past Surgical History:   Procedure Laterality Date   • INGUINAL HERNIA REPAIR Right 8/15/2019    Procedure: REPAIR, HERNIA, INGUINAL;  Surgeon: Ciro Ferguson M.D.;  Location: SURGERY [facility];  Service: General   • BASAL " CELL EXCISION Right 7/2/2019    Procedure: EXCISION, CARCINOMA, BASAL CELL- FOR SQUAMOUS CELL DISTAL PRETIBIAL REGION;  Surgeon: Larry Tobin M.D.;  Location: SURGERY [facility];  Service: Plastics   • SPLIT THICKNESS SKIN GRAFT Right 7/2/2019    Procedure: APPLICATION, GRAFT, SKIN, SPLIT-THICKNESS;  Surgeon: Larry Tobin M.D.;  Location: SURGERY [facility];  Service: Plastics   • CATARACT PHACO WITH IOL Bilateral 2016   • COLONOSCOPY  2014   • LUMBAR FUSION POSTERIOR  2008    L2-L3, L4-L5   • MULTIPLE CORONARY ARTERY BYPASS  1994    6 vessel   • CERVICAL DISK AND FUSION ANTERIOR  1987   • VASECTOMY  1980    Reversal   • TONSILLECTOMY  1955   • COLECTOMY  early 2004    right FOR BENIGN ADENOMA with cholecystectomy       Allergies:  Patient has no known allergies.    Social History:  Social History     Socioeconomic History   • Marital status:      Spouse name: Not on file   • Number of children: Not on file   • Years of education: Not on file   • Highest education level: Not on file   Occupational History   • Not on file   Social Needs   • Financial resource strain: Not on file   • Food insecurity     Worry: Not on file     Inability: Not on file   • Transportation needs     Medical: Not on file     Non-medical: Not on file   Tobacco Use   • Smoking status: Never Smoker   • Smokeless tobacco: Never Used   Substance and Sexual Activity   • Alcohol use: Yes     Alcohol/week: 2.4 oz     Types: 4 Glasses of wine per week     Drinks per session: 1 or 2     Comment: 1 per day   • Drug use: No   • Sexual activity: Not Currently   Lifestyle   • Physical activity     Days per week: Not on file     Minutes per session: Not on file   • Stress: Not on file   Relationships   • Social connections     Talks on phone: Not on file     Gets together: Not on file     Attends Christianity service: Not on file     Active member of club or organization: Not on file     Attends meetings of clubs or organizations:  Not on file     Relationship status: Not on file   • Intimate partner violence     Fear of current or ex partner: Not on file     Emotionally abused: Not on file     Physically abused: Not on file     Forced sexual activity: Not on file   Other Topics Concern   • Not on file   Social History Narrative   • Not on file       Family History:  Family History   Problem Relation Age of Onset   • Cancer Mother         breast   • Heart Disease Mother    • Diabetes Father    • Cancer Father         bladder   • Other Father         ruptured appendix   • Diabetes Paternal Grandfather        Medications:    Current Outpatient Medications:   •  ferrous sulfate 325 (65 Fe) MG EC tablet, Take 1 Tab by mouth 2 times a day., Disp: 180 Tab, Rfl: 3  •  glimepiride (AMARYL) 1 MG tablet, Take 1 mg by mouth 2 times a day., Disp: , Rfl:   •  olmesartan (BENICAR) 40 MG Tab, Take 20 mg by mouth every evening., Disp: , Rfl:   •  triamcinolone (NASACORT ALLERGY 24HR) 55 MCG/ACT nasal inhaler, Spray 2 Sprays in nose every day., Disp: , Rfl:   •  Coenzyme Q10 (CO Q10) 100 MG Cap, Take 2 Caps by mouth 2 Times a Day., Disp: , Rfl:   •  Empagliflozin 10 MG Tab, Take 1 tablet by mouth every morning with breakfast., Disp: 90 Tab, Rfl: 3  •  Exenatide ER (BYDUREON) 2 MG Pen-injector, Inject 2 mg as instructed every 7 days., Disp: 12 Each, Rfl: 3  •  levothyroxine (SYNTHROID) 100 MCG Tab, Take 1 Tab by mouth Every morning on an empty stomach., Disp: 90 Tab, Rfl: 3  •  niacinamide 500 MG tablet, Take 500 mg by mouth 2 Times a Day., Disp: , Rfl:   •  rosuvastatin (CRESTOR) 10 MG Tab, Take 1 Tab by mouth every evening., Disp: 90 Tab, Rfl: 3  •  Ascorbic Acid (VITAMIN C) 1000 MG Tab, Take 1,000 mg by mouth every day., Disp: , Rfl:   •  Omega-3 Fatty Acids (FISH OIL) 1200 MG CAPS, Take 2 Caps by mouth every day., Disp: , Rfl:   •  Cholecalciferol (VITAMIN D) 2000 UNIT TABS, Take 1 Tab by mouth every day., Disp: , Rfl:   •  aspirin 81 MG tablet, Take 81 mg  by mouth every day., Disp: , Rfl:     Labs: Reviewed    Physical Examination:  Vital signs: /62 (BP Location: Left arm, Patient Position: Sitting, BP Cuff Size: Adult)   Pulse 76   Ht 1.829 m (6')   Wt 69.4 kg (153 lb)   SpO2 95%   BMI 20.75 kg/m²  Body mass index is 20.75 kg/m².  General: No apparent distress, cooperative  Eyes: No scleral icterus or discharge  ENMT: Normal on external inspection of nose, lips, normal thyroid exam  Neck: No abnormal masses on inspection  Resp: Normal effort, clear to auscultation bilaterally   CVS: Regular rate and rhythm, S1 S2 normal, no murmur   Extremities: No edema  Abdomen: abdominal obesity present  Neuro: Alert and oriented  Skin: No rash  Psych: Normal mood and affect, intact memory and able to make informed decisions    Assessment and Plan:  1. Acquired hypothyroidism  Continue current dose of levothyroxine    2. Vitamin D deficiency  continue vitamin D supplementation    3. Uncontrolled type 2 diabetes mellitus with hyperglycemia (HCC)  Controlled. continue current regimen     He will get it done labs when he gets chance.    Return in about 4 months (around 10/19/2020).    Thank you for allowing me to participate in the care of this patient.    Darrion Mcbride M.D.  10/03/19    CC:   Virginia Morales M.D.    This note was created using voice recognition software (Dragon). The accuracy of the dictation is limited by the abilities of the software. I have reviewed the note prior to signing, however some errors in grammar and context are still possible. If you have any questions related to this note please do not hesitate to contact our office.   This note was scribed by Cassie Bowden RN, CDE

## 2020-07-30 ENCOUNTER — OFFICE VISIT (OUTPATIENT)
Dept: PULMONOLOGY | Facility: HOSPICE | Age: 85
End: 2020-07-30
Payer: MEDICARE

## 2020-07-30 VITALS
WEIGHT: 152 LBS | DIASTOLIC BLOOD PRESSURE: 56 MMHG | BODY MASS INDEX: 20.59 KG/M2 | RESPIRATION RATE: 16 BRPM | HEIGHT: 72 IN | OXYGEN SATURATION: 97 % | HEART RATE: 62 BPM | SYSTOLIC BLOOD PRESSURE: 110 MMHG

## 2020-07-30 DIAGNOSIS — G47.33 OSA (OBSTRUCTIVE SLEEP APNEA): ICD-10-CM

## 2020-07-30 DIAGNOSIS — Z78.9 NONSMOKER: ICD-10-CM

## 2020-07-30 PROCEDURE — 99214 OFFICE O/P EST MOD 30 MIN: CPT | Performed by: NURSE PRACTITIONER

## 2020-07-30 ASSESSMENT — FIBROSIS 4 INDEX: FIB4 SCORE: 2.56

## 2020-07-30 NOTE — PROGRESS NOTES
Chief Complaint   Patient presents with   • Follow-Up     Last Seen 7/11/19       HPI:  Juanito Marquez is a 84 y.o. year old male here today for follow-up on FLEX. Retired radiologist.    Last OV 7/11/19 with Dr. Read.    Prior PSG not available but diagnosed >10yrs ago and treated with CPAP 7cm. Prior CNOX 2018 on pap noted basal spo2 91% and <88% for 25min of night.  He denies any shortness of breath during the night or waking up gasping.  He denies morning headaches.  He feels his pressures overall adequate.  He would be amenable to attempting auto CPAP to see if this will further improve his AHI per hour.  Compliance report 6/30/2020 through 7/29/2020 indicates 100% compliance, average nightly use 5 hours 38 minutes, minimal mask leak with an overall AHI of 6.1/h.  I reviewed finds with patient.  Current device is 4 years old.    He denies any significant shortness of breath or cardiac symptoms.  He notes mild allergies relieved by Nasacort nasal spray.  He denies GERD on a regular basis.  He tolerates his mask and pressure.  He continues to golf and walk regularly.  Prior echo August 2019 indicated EF 65%, moderate TR and RVSP of 30 mmHg.    ROS: As per HPI and otherwise negative if not stated.    Past Medical History:   Diagnosis Date   • Back pain 6/23/2009   • Breath shortness     with altitude over 7300 feet   • CAD (coronary artery disease)     CABG 1994. Cardiogy with [facility].   • Cataract     Bilateral phaco with IOL   • Chronic diarrhea 7/21/2008   • Colon polyp 2007   • Disorder of thyroid    • DM (diabetes mellitus) (HCC)     6/24/19-Avg AM glucose=130-180, oral meds    • Hemorrhagic disorder (HCC)     bleeds easily due to ASA   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   • Sleep apnea     CPAP   • Snoring    • status post CABG 1994    • Status post cholecystectomy 2004       Past Surgical History:   Procedure Laterality Date   • INGUINAL HERNIA REPAIR Right 8/15/2019    Procedure: REPAIR, HERNIA, INGUINAL;  Surgeon: Ciro Ferguson M.D.;  Location: SURGERY [facility];  Service: General   • BASAL CELL EXCISION Right 7/2/2019    Procedure: EXCISION, CARCINOMA, BASAL CELL- FOR SQUAMOUS CELL DISTAL PRETIBIAL REGION;  Surgeon: Larry Tobin M.D.;  Location: SURGERY [facility];  Service: Plastics   • SPLIT THICKNESS SKIN GRAFT Right 7/2/2019    Procedure: APPLICATION, GRAFT, SKIN, SPLIT-THICKNESS;  Surgeon: Larry Tobin M.D.;  Location: SURGERY [facility];  Service: Plastics   • CATARACT PHACO WITH IOL Bilateral 2016   • COLONOSCOPY  2014   • LUMBAR FUSION POSTERIOR  2008    L2-L3, L4-L5   • MULTIPLE CORONARY ARTERY BYPASS  1994    6 vessel   • CERVICAL DISK AND FUSION ANTERIOR  1987   • VASECTOMY  1980    Reversal   • TONSILLECTOMY  1955   • COLECTOMY  early 2004    right FOR BENIGN ADENOMA with cholecystectomy       Family History   Problem Relation Age of Onset   • Cancer Mother         breast   • Heart Disease Mother    • Diabetes Father    • Cancer Father         bladder   • Other Father         ruptured appendix   • Diabetes Paternal Grandfather        Social History     Socioeconomic History   • Marital status:      Spouse name: Not on file   • Number of children: Not on file   • Years of education: Not on file   • Highest education level: Not on file   Occupational History   • Not on file   Social Needs   • Financial resource strain: Not on file   • Food insecurity     Worry: Not on file     Inability: Not on file   • Transportation needs     Medical: Not on file     Non-medical: Not on file   Tobacco Use   • Smoking status: Never Smoker   • Smokeless tobacco: Never Used   Substance and Sexual Activity   • Alcohol use: Yes     Alcohol/week: 2.4 oz     Types: 4 Glasses of wine per week     Drinks per session: 1  or 2     Comment: 1 per day   • Drug use: No   • Sexual activity: Not Currently   Lifestyle   • Physical activity     Days per week: Not on file     Minutes per session: Not on file   • Stress: Not on file   Relationships   • Social connections     Talks on phone: Not on file     Gets together: Not on file     Attends Orthodoxy service: Not on file     Active member of club or organization: Not on file     Attends meetings of clubs or organizations: Not on file     Relationship status: Not on file   • Intimate partner violence     Fear of current or ex partner: Not on file     Emotionally abused: Not on file     Physically abused: Not on file     Forced sexual activity: Not on file   Other Topics Concern   • Not on file   Social History Narrative   • Not on file       Allergies as of 07/30/2020   • (No Known Allergies)        Vitals:  /56 (BP Location: Left arm, Patient Position: Sitting, BP Cuff Size: Adult)   Pulse 62   Resp 16   Ht 1.829 m (6')   Wt 68.9 kg (152 lb)   SpO2 97%     Current medications as of today   Current Outpatient Medications   Medication Sig Dispense Refill   • Ferrous Sulfate Dried (FERROUS SULFATE IRON PO) Take  by mouth.     • Cyanocobalamin (B-12 PO) Take  by mouth.     • ferrous sulfate 325 (65 Fe) MG EC tablet Take 1 Tab by mouth 2 times a day. 180 Tab 3   • glimepiride (AMARYL) 1 MG tablet Take 1 mg by mouth 2 times a day.     • olmesartan (BENICAR) 40 MG Tab Take 20 mg by mouth every evening.     • triamcinolone (NASACORT ALLERGY 24HR) 55 MCG/ACT nasal inhaler Spray 2 Sprays in nose every day.     • Coenzyme Q10 (CO Q10) 100 MG Cap Take 2 Caps by mouth 2 Times a Day.     • Empagliflozin 10 MG Tab Take 1 tablet by mouth every morning with breakfast. 90 Tab 3   • Exenatide ER (BYDUREON) 2 MG Pen-injector Inject 2 mg as instructed every 7 days. 12 Each 3   • levothyroxine (SYNTHROID) 100 MCG Tab Take 1 Tab by mouth Every morning on an empty stomach. 90 Tab 3   • niacinamide 500  MG tablet Take 500 mg by mouth 2 Times a Day.     • rosuvastatin (CRESTOR) 10 MG Tab Take 1 Tab by mouth every evening. 90 Tab 3   • Ascorbic Acid (VITAMIN C) 1000 MG Tab Take 1,000 mg by mouth every day.     • Omega-3 Fatty Acids (FISH OIL) 1200 MG CAPS Take 2 Caps by mouth every day.     • Cholecalciferol (VITAMIN D) 2000 UNIT TABS Take 1 Tab by mouth every day.     • aspirin 81 MG tablet Take 81 mg by mouth every day.       No current facility-administered medications for this visit.          Physical Exam:   Gen:           Alert and oriented, No apparent distress. Mood and affect appropriate, normal interaction with examiner.  Eyes:          PERRL, EOM intact, sclere white, conjunctive moist. Glasses.  Ears:          Not examined.   Hearing:     Grossly intact.  Nose:          Normal, no lesions or deformities.  Dentition:    Good dentition.  Oropharynx:   mask  Mallampati Classification: mask  Neck:        Supple, trachea midline, no masses.  Respiratory Effort: No intercostal retractions or use of accessory muscles.   Lung Auscultation:      Clear to auscultation bilaterally; no rales, rhonchi or wheezing.  CV:            Regular rate and rhythm. No murmurs, rubs or gallops.  Abd:           Not examined.   Lymphadenopathy: Not examined.  Gait and Station: Normal.  Digits and Nails: No clubbing, cyanosis, petechiae, or nodes.   Cranial Nerves: II-XII grossly intact.  Skin:        No rashes, lesions or ulcers noted.               Ext:           No cyanosis or edema.      Assessment:  1. FLEX (obstructive sleep apnea)  DME Mask and Supplies    DME Other   2. BMI 20.0-20.9, adult     3. Nonsmoker         Immunizations:    Flu:11/2019  Pneumovax 23:recommend  Prevnar 13:102/105    Plan:  1.  Patient sleep apnea is clinically stable.  He is amenable to adjusting his CPAP and auto CPAP 7 to 11 cm.  DME other; pressure adjustment in office.  DME mask/supplies.  Patient will call if he feels there is an issue with his  new pressure.  2.  Discussed the patient.  3.  Encouraged continued regular activity.  4.  Follow-up with primary care for other health concerns.  5.  Follow-up in 1 year's compliance report, sooner if needed.    Please note that this dictation was created using voice recognition software. I have made every reasonable attempt to correct obvious errors, but it is possible there are errors of grammar and possibly content that I did not discover before finalizing the note.

## 2020-09-10 ENCOUNTER — OFFICE VISIT (OUTPATIENT)
Dept: CARDIOLOGY | Facility: MEDICAL CENTER | Age: 85
End: 2020-09-10
Payer: MEDICARE

## 2020-09-10 VITALS
WEIGHT: 153.4 LBS | HEART RATE: 72 BPM | HEIGHT: 72 IN | DIASTOLIC BLOOD PRESSURE: 54 MMHG | BODY MASS INDEX: 20.78 KG/M2 | SYSTOLIC BLOOD PRESSURE: 114 MMHG | OXYGEN SATURATION: 98 %

## 2020-09-10 DIAGNOSIS — I25.83 CORONARY ARTERY DISEASE DUE TO LIPID RICH PLAQUE: ICD-10-CM

## 2020-09-10 DIAGNOSIS — Z95.1 S/P CABG (CORONARY ARTERY BYPASS GRAFT): ICD-10-CM

## 2020-09-10 DIAGNOSIS — I25.10 CORONARY ARTERY DISEASE DUE TO LIPID RICH PLAQUE: ICD-10-CM

## 2020-09-10 DIAGNOSIS — N18.30 CKD (CHRONIC KIDNEY DISEASE) STAGE 3, GFR 30-59 ML/MIN: Chronic | ICD-10-CM

## 2020-09-10 PROCEDURE — 99214 OFFICE O/P EST MOD 30 MIN: CPT | Performed by: INTERNAL MEDICINE

## 2020-09-10 ASSESSMENT — FIBROSIS 4 INDEX: FIB4 SCORE: 2.6

## 2020-09-11 ASSESSMENT — ENCOUNTER SYMPTOMS
PALPITATIONS: 0
NAUSEA: 0
BRUISES/BLEEDS EASILY: 0
FOCAL WEAKNESS: 0
WEAKNESS: 0
SORE THROAT: 0
CLAUDICATION: 0
CHILLS: 0
SHORTNESS OF BREATH: 1
COUGH: 0
ABDOMINAL PAIN: 0
DIZZINESS: 0
FEVER: 0
PND: 0
FALLS: 0
BLURRED VISION: 0

## 2020-09-12 NOTE — PROGRESS NOTES
Chief Complaint   Patient presents with   • Follow-Up     Coronary Artery Disease due to lipid rich plaque       Subjective:   Juanito Marquez is a 85 y.o. male who presents today for follow-up of his history of CABG remotely with diabetes    He is done well over the last year been able to get his blood sugars even better with recent changes in his diabetes management    Exertional tolerance has been stable      Past Medical History:   Diagnosis Date   • Back pain 6/23/2009   • Breath shortness     with altitude over 7300 feet   • CAD (coronary artery disease)     CABG 1994. Cardiogy with [facility].   • Cataract     Bilateral phaco with IOL   • Chronic diarrhea 7/21/2008   • CKD (chronic kidney disease) stage 3, GFR 30-59 ml/min ([facility])    • Colon polyp 2007   • Disorder of thyroid    • DM (diabetes mellitus) ([facility])     6/24/19-Avg AM glucose=130-180, oral meds    • Hemorrhagic disorder ([facility])     bleeds easily due to ASA   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   • Sleep apnea     CPAP   • Snoring    • status post CABG 1994   • Status post cholecystectomy 2004     Past Surgical History:   Procedure Laterality Date   • INGUINAL HERNIA REPAIR Right 8/15/2019    Procedure: REPAIR, HERNIA, INGUINAL;  Surgeon: Ciro Ferguson M.D.;  Location: [facility];  Service: General   • BASAL CELL EXCISION Right 7/2/2019    Procedure: EXCISION, CARCINOMA, BASAL CELL- FOR SQUAMOUS CELL DISTAL PRETIBIAL REGION;  Surgeon: Larry Tobin M.D.;  Location: [facility];  Service: Plastics   • SPLIT THICKNESS SKIN GRAFT Right 7/2/2019    Procedure: APPLICATION, GRAFT, SKIN, SPLIT-THICKNESS;  Surgeon: Larry Tobin M.D.;  Location: [facility];  Service: Plastics   • CATARACT PHACO WITH IOL Bilateral 2016   • COLONOSCOPY  2014    • LUMBAR FUSION POSTERIOR  2008    L2-L3, L4-L5   • MULTIPLE CORONARY ARTERY BYPASS  1994    6 vessel   • CERVICAL DISK AND FUSION ANTERIOR  1987   • VASECTOMY  1980    Reversal   • TONSILLECTOMY  1955   • COLECTOMY  early 2004    right FOR BENIGN ADENOMA with cholecystectomy     Family History   Problem Relation Age of Onset   • Cancer Mother         breast   • Heart Disease Mother    • Diabetes Father    • Cancer Father         bladder   • Other Father         ruptured appendix   • Diabetes Paternal Grandfather      Social History     Socioeconomic History   • Marital status:      Spouse name: Not on file   • Number of children: Not on file   • Years of education: Not on file   • Highest education level: Not on file   Occupational History   • Not on file   Social Needs   • Financial resource strain: Not on file   • Food insecurity     Worry: Not on file     Inability: Not on file   • Transportation needs     Medical: Not on file     Non-medical: Not on file   Tobacco Use   • Smoking status: Never Smoker   • Smokeless tobacco: Never Used   Substance and Sexual Activity   • Alcohol use: Yes     Alcohol/week: 2.4 oz     Types: 4 Glasses of wine per week     Drinks per session: 1 or 2     Comment: 1 per day   • Drug use: No   • Sexual activity: Not Currently   Lifestyle   • Physical activity     Days per week: Not on file     Minutes per session: Not on file   • Stress: Not on file   Relationships   • Social connections     Talks on phone: Not on file     Gets together: Not on file     Attends Mandaeism service: Not on file     Active member of club or organization: Not on file     Attends meetings of clubs or organizations: Not on file     Relationship status: Not on file   • Intimate partner violence     Fear of current or ex partner: Not on file     Emotionally abused: Not on file     Physically abused: Not on file     Forced sexual activity: Not on file   Other Topics Concern   • Not on file   Social  History Narrative   • Not on file     No Known Allergies  Outpatient Encounter Medications as of 9/10/2020   Medication Sig Dispense Refill   • Cyanocobalamin (B-12 PO) Take  by mouth.     • ferrous sulfate 325 (65 Fe) MG EC tablet Take 1 Tab by mouth 2 times a day. 180 Tab 3   • glimepiride (AMARYL) 1 MG tablet Take 1 mg by mouth 2 times a day.     • olmesartan (BENICAR) 40 MG Tab Take 20 mg by mouth every evening.     • triamcinolone (NASACORT ALLERGY 24HR) 55 MCG/ACT nasal inhaler Spray 2 Sprays in nose every day.     • Coenzyme Q10 (CO Q10) 100 MG Cap Take 2 Caps by mouth 2 Times a Day.     • Empagliflozin 10 MG Tab Take 1 tablet by mouth every morning with breakfast. 90 Tab 3   • Exenatide ER (BYDUREON) 2 MG Pen-injector Inject 2 mg as instructed every 7 days. 12 Each 3   • levothyroxine (SYNTHROID) 100 MCG Tab Take 1 Tab by mouth Every morning on an empty stomach. 90 Tab 3   • niacinamide 500 MG tablet Take 500 mg by mouth 2 Times a Day.     • rosuvastatin (CRESTOR) 10 MG Tab Take 1 Tab by mouth every evening. 90 Tab 3   • Ascorbic Acid (VITAMIN C) 1000 MG Tab Take 1,000 mg by mouth every day.     • Omega-3 Fatty Acids (FISH OIL) 1200 MG CAPS Take 2 Caps by mouth every day.     • Cholecalciferol (VITAMIN D) 2000 UNIT TABS Take 1 Tab by mouth every day.     • aspirin 81 MG tablet Take 81 mg by mouth every day.     • [DISCONTINUED] Ferrous Sulfate Dried (FERROUS SULFATE IRON PO) Take  by mouth.       No facility-administered encounter medications on file as of 9/10/2020.      Review of Systems   Constitutional: Negative for chills and fever.   HENT: Negative for sore throat.    Eyes: Negative for blurred vision.   Respiratory: Positive for shortness of breath. Negative for cough.    Cardiovascular: Negative for chest pain, palpitations, claudication, leg swelling and PND.   Gastrointestinal: Negative for abdominal pain and nausea.   Musculoskeletal: Negative for falls and joint pain.   Skin: Negative for rash.    Neurological: Negative for dizziness, focal weakness and weakness.   Endo/Heme/Allergies: Does not bruise/bleed easily.        Objective:   /54 (BP Location: Left arm, Patient Position: Sitting, BP Cuff Size: Adult)   Pulse 72   Ht 1.829 m (6')   Wt 69.6 kg (153 lb 6.4 oz)   SpO2 98%   BMI 20.80 kg/m²     Physical Exam   Constitutional: No distress.   HENT:   Patient wearing a mask due to COVID precautions   Eyes: No scleral icterus.   Neck: No JVD present.   Cardiovascular: Normal rate and normal heart sounds. Exam reveals no gallop and no friction rub.   No murmur heard.  Pulmonary/Chest: No respiratory distress. He has no wheezes. He has no rales.   Abdominal: Soft. Bowel sounds are normal.   Musculoskeletal:         General: No edema.   Neurological: He is alert.   Skin: No rash noted. He is not diaphoretic.   Psychiatric: He has a normal mood and affect.     We reviewed in person the most recent labs  Recent Results (from the past 4032 hour(s))   POCT Hemoglobin A1C    Collection Time: 06/19/20  4:22 PM   Result Value Ref Range    Glycohemoglobin 6.3 (A) 0.0 - 5.6 %    Internal Control Negative Negative     Internal Control Positive Positive      [facility]  Assessment:     1. S/P CABG (coronary artery bypass graft)     2. Coronary artery disease due to lipid rich plaque     3. CKD (chronic kidney disease) stage 3, GFR 30-59 ml/min ([facility])         Medical Decision Making:  Today's Assessment / Status / Plan:     It was my pleasure to meet with Mr. Marquez.    Blood pressure is well controlled.  We specifically assessed the labs on hypertension treatment    He is on appropriate statin.    I will see Mr. Marquez back in 1 year time and encouraged him to follow up with us over the phone or electronically using my MyChart as issues arise.    It is my pleasure to participate in the care of Mr. Marquez.  Please do not hesitate to contact me with questions or concerns.    Ulices Mcintyre MD PhD  FAC  Cardiologist [facility]    Please note that this dictation was created using voice recognition software. There may be errors I did not discover before finalizing the note.

## 2020-09-18 NOTE — TELEPHONE ENCOUNTER
5 He is safe to proceed, no testing required, hold antiplatelet as necessary.    Ulices Mcintyre M.D.   Anais Senior R.N. 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 Marquez.  Please do not hesitate to contact me with questions or concerns. [facility] is available 24/7 for consultative services at [phone] in the perioperative period.    Electronically Signed    Ulices Mcintyre MD PhD [facility]  Cardiologist [facility]    Please note that this dictation was created using voice recognition software. I have worked with consultants from the vendor as well as technical experts from [facility] to optimize the interface. I have made every reasonable attempt to correct obvious errors, but I expect that there are errors of grammar and possibly content I did not discover before finalizing the note.

## 2020-10-12 ENCOUNTER — OFFICE VISIT (OUTPATIENT)
Dept: ENDOCRINOLOGY | Facility: MEDICAL CENTER | Age: 85
End: 2020-10-12
Attending: INTERNAL MEDICINE
Payer: MEDICARE

## 2020-10-12 VITALS
HEIGHT: 72 IN | DIASTOLIC BLOOD PRESSURE: 62 MMHG | WEIGHT: 153.7 LBS | BODY MASS INDEX: 20.82 KG/M2 | OXYGEN SATURATION: 97 % | HEART RATE: 101 BPM | SYSTOLIC BLOOD PRESSURE: 102 MMHG

## 2020-10-12 DIAGNOSIS — E11.65 UNCONTROLLED TYPE 2 DIABETES MELLITUS WITH HYPERGLYCEMIA (HCC): ICD-10-CM

## 2020-10-12 DIAGNOSIS — E78.5 DYSLIPIDEMIA: ICD-10-CM

## 2020-10-12 DIAGNOSIS — E55.9 VITAMIN D DEFICIENCY: ICD-10-CM

## 2020-10-12 DIAGNOSIS — E03.9 ACQUIRED HYPOTHYROIDISM: ICD-10-CM

## 2020-10-12 DIAGNOSIS — E11.8 CONTROLLED TYPE 2 DIABETES MELLITUS WITH COMPLICATION, WITHOUT LONG-TERM CURRENT USE OF INSULIN (HCC): Primary | ICD-10-CM

## 2020-10-12 LAB
HBA1C MFR BLD: 5.9 % (ref 0–5.6)
INT CON NEG: ABNORMAL
INT CON POS: ABNORMAL

## 2020-10-12 PROCEDURE — 99213 OFFICE O/P EST LOW 20 MIN: CPT | Performed by: INTERNAL MEDICINE

## 2020-10-12 PROCEDURE — 99214 OFFICE O/P EST MOD 30 MIN: CPT | Performed by: INTERNAL MEDICINE

## 2020-10-12 PROCEDURE — 83036 HEMOGLOBIN GLYCOSYLATED A1C: CPT | Performed by: INTERNAL MEDICINE

## 2020-10-12 RX ORDER — GLYBURIDE 1.25 MG/1
1.25 TABLET ORAL
COMMUNITY
Start: 2020-09-18

## 2020-10-12 ASSESSMENT — FIBROSIS 4 INDEX: FIB4 SCORE: 2.6

## 2020-10-12 NOTE — PROGRESS NOTES
"Endocrinology Clinic Progress Note  PCP: Virginia Morales M.D.    HPI:  Juanito Marquez is a 85 y.o. old patient who is seen today for review of his endocrine problems.   1. Type 2 diabetes.      Current Diabetes Regimen:  Glimepiride 1 mg bid (states insurance no longer covering, order sent to change to Glipizide 1.25 mg per day in September.  Patient states pharmacy never notified him)  Jardiance 10 mg per day  Bydureon 2 mg weekly    Most Recent HbA1c:   Lab Results   Component Value Date/Time    HBA1C 5.9 (A) 10/12/2020 09:00 AM      Previous A1c was 6.3 on 06/19/2020    Patient has been testing blood sugars 1 times per day.   States fasting blood sugars in the 130 range most of the time.    Hypoglycemia:  None    Exercise: walking 2 miles per day when he is not playing golf.   Diet: \"healthy\" diet  in general  Last Retinal Exam: states completed.   Daily Foot Exam: yes denies problems.   Foot Exam:  Monofilament: done  Monofilament testing with a 10 gram force: sensation intact: intact bilaterally  Visual Inspection: Feet without maceration, ulcers, fissures.  Pedal pulses: intact bilaterally    2. Hypothyroid: currently on Levothyroxine 100 mg daily, first thing in the morning on empty stomach.    Labs done on 8/24/2020 at [facility] show TSH of 0.08, T4 of 1.20, T3 of 77 and Free T3 of 2.4  3. Dyslipidemia: currently on Rosuvastatin 10 mg daily.    Labs done on 8/24/2020 at [facility] show Total Cholesterol of 137, Trig. 74, HDL 62 and LDL 50   4. Vitamin D deficiency: taking supplemental Vitamin D of 2000 iu per day.    Labs done on 8/24/2020 at [facility] show Vitamin D level of 61    He is under a lot of stress as his wife recently had recurrence of breast cancer    ROS:  Constitutional: No weight loss  Cardiac: No palpitations or racing heart  Resp: No shortness of breath  Neuro: No numbness or tinging in feet  Endo: No heat or cold intolerance, no polyuria or " polydipsia  All other systems were reviewed and were negative.    Current Outpatient Medications   Medication Sig Dispense Refill   • olmesartan (BENICAR) 40 MG Tab Take 20 mg by mouth every evening.     • triamcinolone (NASACORT ALLERGY 24HR) 55 MCG/ACT nasal inhaler Spray 2 Sprays in nose every day.     • Coenzyme Q10 (CO Q10) 100 MG Cap Take 2 Caps by mouth 2 Times a Day.     • Empagliflozin 10 MG Tab Take 1 tablet by mouth every morning with breakfast. 90 Tab 3   • Exenatide ER (BYDUREON) 2 MG Pen-injector Inject 2 mg as instructed every 7 days. 12 Each 3   • niacinamide 500 MG tablet Take 500 mg by mouth 2 Times a Day.     • rosuvastatin (CRESTOR) 10 MG Tab Take 1 Tab by mouth every evening. 90 Tab 3   • Ascorbic Acid (VITAMIN C) 1000 MG Tab Take 1,000 mg by mouth every day.     • Omega-3 Fatty Acids (FISH OIL) 1200 MG CAPS Take 2 Caps by mouth every day.     • Cholecalciferol (VITAMIN D) 2000 UNIT TABS Take 2 Tabs by mouth every day.     • aspirin 81 MG tablet Take 81 mg by mouth every day.     • glyBURIDE (DIABETA) 1.25 MG tablet Take 1.25 mg by mouth.     • ferrous sulfate 325 (65 Fe) MG EC tablet Take 1 Tab by mouth 2 times a day. (Patient not taking: Reported on 10/12/2020) 180 Tab 3   • glimepiride (AMARYL) 1 MG tablet Take 1 mg by mouth 2 times a day.     • levothyroxine (SYNTHROID) 100 MCG Tab Take 1 Tab by mouth Every morning on an empty stomach. 90 Tab 3     No current facility-administered medications for this visit.      Labs: Reviewed    Physical Examination:  Vital signs: /62 (BP Location: Left arm, Patient Position: Sitting, BP Cuff Size: Adult)   Pulse (!) 101   Ht 1.829 m (6')   Wt 69.7 kg (153 lb 11.2 oz)   SpO2 97%   BMI 20.85 kg/m²  Body mass index is 20.85 kg/m².  General: No apparent distress, cooperative  Eyes: No scleral icterus or discharge  ENMT: Normal on external inspection of nose, lips  Neck: No abnormal masses on inspection  Resp: Normal effort, clear to auscultation  bilaterally   CVS: Regular rate and rhythm, S1 S2 normal, no murmur   Extremities: No edema  Abdomen: no abdominal obesity present  Neuro: Alert and oriented  Skin: No rash  Psych: Normal mood and affect, intact memory and able to make informed decisions    Assessment and Plan:  1. Controlled type 2 diabetes mellitus with complication, without long-term current use of insulin (HCC)  Continue current regimen.  Doing well overall.    2. Acquired hypothyroidism  Continue current dose of levothyroxine.     3. Dyslipidemia  Continue statin.     4. Vitamin D deficiency  Cont vit d with food as per HPI    Return in about 8 months (around 6/12/2021).    Thank you for allowing me to participate in the care of this patient.    Darrion Mcbride M.D.  10/12/20    CC:   Virginia Morales M.D.    This note was created using voice recognition software (Dragon). The accuracy of the dictation is limited by the abilities of the software. I have reviewed the note prior to signing, however some errors in grammar and context are still possible. If you have any questions related to this note please do not hesitate to contact our office.

## 2020-11-05 ENCOUNTER — APPOINTMENT (OUTPATIENT)
Age: 85
Setting detail: DERMATOLOGY
End: 2020-11-09

## 2020-11-05 DIAGNOSIS — D485 NEOPLASM OF UNCERTAIN BEHAVIOR OF SKIN: ICD-10-CM

## 2020-11-05 DIAGNOSIS — Z85.828 PERSONAL HISTORY OF OTHER MALIGNANT NEOPLASM OF SKIN: ICD-10-CM

## 2020-11-05 DIAGNOSIS — L82.1 OTHER SEBORRHEIC KERATOSIS: ICD-10-CM

## 2020-11-05 DIAGNOSIS — D18.0 HEMANGIOMA: ICD-10-CM

## 2020-11-05 DIAGNOSIS — D22 MELANOCYTIC NEVI: ICD-10-CM

## 2020-11-05 DIAGNOSIS — L57.8 OTHER SKIN CHANGES DUE TO CHRONIC EXPOSURE TO NONIONIZING RADIATION: ICD-10-CM

## 2020-11-05 DIAGNOSIS — Z86.006 PERSONAL HISTORY OF MELANOMA IN-SITU: ICD-10-CM

## 2020-11-05 DIAGNOSIS — Z86.007 PERSONAL HISTORY OF IN-SITU NEOPLASM OF SKIN: ICD-10-CM

## 2020-11-05 DIAGNOSIS — L57.0 ACTINIC KERATOSIS: ICD-10-CM

## 2020-11-05 DIAGNOSIS — Z71.89 OTHER SPECIFIED COUNSELING: ICD-10-CM

## 2020-11-05 PROBLEM — Z85.820 PERSONAL HISTORY OF MALIGNANT MELANOMA OF SKIN: Status: ACTIVE | Noted: 2020-11-05

## 2020-11-05 PROBLEM — D48.5 NEOPLASM OF UNCERTAIN BEHAVIOR OF SKIN: Status: ACTIVE | Noted: 2020-11-05

## 2020-11-05 PROBLEM — D22.5 MELANOCYTIC NEVI OF TRUNK: Status: ACTIVE | Noted: 2020-11-05

## 2020-11-05 PROBLEM — D18.01 HEMANGIOMA OF SKIN AND SUBCUTANEOUS TISSUE: Status: ACTIVE | Noted: 2020-11-05

## 2020-11-05 PROCEDURE — OTHER TREATMENT REGIMEN: OTHER

## 2020-11-05 PROCEDURE — 11102 TANGNTL BX SKIN SINGLE LES: CPT

## 2020-11-05 PROCEDURE — 99213 OFFICE O/P EST LOW 20 MIN: CPT | Mod: 25

## 2020-11-05 PROCEDURE — OTHER COUNSELING: OTHER

## 2020-11-05 PROCEDURE — OTHER BIOPSY BY SHAVE METHOD: OTHER

## 2020-11-05 PROCEDURE — 11103 TANGNTL BX SKIN EA SEP/ADDL: CPT

## 2020-11-05 ASSESSMENT — LOCATION ZONE DERM
LOCATION ZONE: SCALP
LOCATION ZONE: ARM
LOCATION ZONE: TRUNK
LOCATION ZONE: LEG
LOCATION ZONE: FACE
LOCATION ZONE: HAND

## 2020-11-05 ASSESSMENT — LOCATION DETAILED DESCRIPTION DERM
LOCATION DETAILED: RIGHT MEDIAL EYEBROW
LOCATION DETAILED: RIGHT CENTRAL POSTAURICULAR SKIN
LOCATION DETAILED: RIGHT RADIAL DORSAL HAND
LOCATION DETAILED: INFERIOR THORACIC SPINE
LOCATION DETAILED: RIGHT DISTAL PRETIBIAL REGION
LOCATION DETAILED: RIGHT CENTRAL TEMPLE
LOCATION DETAILED: SUPERIOR THORACIC SPINE
LOCATION DETAILED: LEFT DISTAL DORSAL FOREARM
LOCATION DETAILED: EPIGASTRIC SKIN
LOCATION DETAILED: RIGHT MEDIAL PROXIMAL PRETIBIAL REGION
LOCATION DETAILED: RIGHT PROXIMAL DORSAL FOREARM
LOCATION DETAILED: LEFT PROXIMAL DORSAL FOREARM
LOCATION DETAILED: LEFT ANTERIOR DISTAL THIGH
LOCATION DETAILED: RIGHT PROXIMAL PRETIBIAL REGION
LOCATION DETAILED: LEFT MEDIAL UPPER BACK
LOCATION DETAILED: RIGHT SUPERIOR LATERAL LOWER BACK
LOCATION DETAILED: RIGHT LATERAL INFERIOR CHEST
LOCATION DETAILED: RIGHT DISTAL DORSAL FOREARM
LOCATION DETAILED: LEFT LATERAL MALAR CHEEK
LOCATION DETAILED: RIGHT CENTRAL MALAR CHEEK
LOCATION DETAILED: LEFT ULNAR DORSAL HAND
LOCATION DETAILED: LEFT INFERIOR MEDIAL UPPER BACK

## 2020-11-05 ASSESSMENT — LOCATION SIMPLE DESCRIPTION DERM
LOCATION SIMPLE: LEFT CHEEK
LOCATION SIMPLE: LEFT HAND
LOCATION SIMPLE: RIGHT HAND
LOCATION SIMPLE: SCALP
LOCATION SIMPLE: RIGHT LOWER BACK
LOCATION SIMPLE: RIGHT EYEBROW
LOCATION SIMPLE: RIGHT CHEEK
LOCATION SIMPLE: LEFT FOREARM
LOCATION SIMPLE: RIGHT PRETIBIAL REGION
LOCATION SIMPLE: RIGHT FOREARM
LOCATION SIMPLE: ABDOMEN
LOCATION SIMPLE: RIGHT TEMPLE
LOCATION SIMPLE: UPPER BACK
LOCATION SIMPLE: CHEST
LOCATION SIMPLE: LEFT THIGH
LOCATION SIMPLE: LEFT UPPER BACK

## 2020-11-05 NOTE — PROCEDURE: BIOPSY BY SHAVE METHOD
Destruction After The Procedure: No
Render Post-Care Instructions In Note?: yes
X Size Of Lesion In Cm: 0
Type Of Destruction Used: Electrodesiccation and Curettage
Electrodesiccation And Curettage Text: The wound bed was treated with electrodesiccation and curettage after the biopsy was performed.
Information: Selecting Yes will display possible errors in your note based on the variables you have selected. This validation is only offered as a suggestion for you. PLEASE NOTE THAT THE VALIDATION TEXT WILL BE REMOVED WHEN YOU FINALIZE YOUR NOTE. IF YOU WANT TO FAX A PRELIMINARY NOTE YOU WILL NEED TO TOGGLE THIS TO 'NO' IF YOU DO NOT WANT IT IN YOUR FAXED NOTE.
Notification Instructions: Patient will be notified of biopsy results. However, patient instructed to call the office if not contacted within 2 weeks.
Silver Nitrate Text: The wound bed was treated with silver nitrate after the biopsy was performed.
Anesthesia Type: 1% lidocaine with epinephrine and a 1:10 solution of 8.4% sodium bicarbonate
Hemostasis: Drysol
Electrodesiccation Text: The wound bed was treated with electrodesiccation after the biopsy was performed.
Dressing: Band-Aid
Wound Care: Vaseline
Post-Care Instructions: I reviewed with the patient in detail post-care instructions. Patient is to keep the biopsy site dry overnight, and then apply vaseline twice daily until healed. Patient may apply hydrogen peroxide soaks to remove any crusting.
Consent: Written consent was obtained and risks were reviewed including but not limited to scarring, infection, bleeding, scabbing, incomplete removal, nerve damage and allergy to anesthesia.
Depth Of Biopsy: dermis
Detail Level: Detailed
Biopsy Type: H and E
Billing Type: Third-Party Bill
Cryotherapy Text: The wound bed was treated with cryotherapy after the biopsy was performed.
Biopsy Method: double edge Personna blade
Anesthesia Volume In Cc (Will Not Render If 0): 0.5

## 2020-11-30 ENCOUNTER — APPOINTMENT (OUTPATIENT)
Age: 85
Setting detail: DERMATOLOGY
End: 2020-12-01

## 2020-11-30 DIAGNOSIS — Z48.1 ENCOUNTER FOR PLANNED POSTPROCEDURAL WOUND CLOSURE: ICD-10-CM

## 2020-11-30 PROBLEM — C44.722 SQUAMOUS CELL CARCINOMA OF SKIN OF RIGHT LOWER LIMB, INCLUDING HIP: Status: ACTIVE | Noted: 2020-11-30

## 2020-11-30 PROCEDURE — 17313 MOHS 1 STAGE T/A/L: CPT

## 2020-11-30 PROCEDURE — OTHER CONSULTATION FOR MOHS SURGERY: OTHER

## 2020-11-30 PROCEDURE — 99213 OFFICE O/P EST LOW 20 MIN: CPT

## 2020-11-30 PROCEDURE — OTHER COUNSELING: OTHER

## 2020-11-30 PROCEDURE — OTHER REPAIR NOTE: OTHER

## 2020-11-30 PROCEDURE — OTHER OTHER: OTHER

## 2020-11-30 PROCEDURE — OTHER CONSULTATION FOR SURGICAL REPAIR: OTHER

## 2020-11-30 PROCEDURE — OTHER MOHS SURGERY: OTHER

## 2020-11-30 PROCEDURE — 17314 MOHS ADDL STAGE T/A/L: CPT

## 2020-11-30 ASSESSMENT — LOCATION DETAILED DESCRIPTION DERM: LOCATION DETAILED: RIGHT DISTAL PRETIBIAL REGION

## 2020-11-30 ASSESSMENT — LOCATION SIMPLE DESCRIPTION DERM: LOCATION SIMPLE: RIGHT PRETIBIAL REGION

## 2020-11-30 ASSESSMENT — LOCATION ZONE DERM: LOCATION ZONE: LEG

## 2020-11-30 NOTE — PROCEDURE: CONSULTATION FOR MOHS SURGERY
Incorporate Mauc In Note: Yes
X Size Of Lesion In Cm (Optional): 1.9
Detail Level: Detailed
Size Of Lesion: 2.1
Name Of The Referring Provider For Procedure: Claudia Holloway MD

## 2020-11-30 NOTE — PROCEDURE: MOHS SURGERY
[address]997 Comprehensive Area H Indication Text: Tumors in this location are included in Area H (eyelids, eyebrows, nose, lips, chin, ear, pre-auricular, post-auricular, temple, genitalia, hands, feet, ankles and areola).  Tissue conservation is critical in these anatomic locations.

## 2020-11-30 NOTE — PROCEDURE: OTHER
Other (Free Text): Discussed skin graft vs healing by secondary intention. He would prefer secondary intention. He understands that he may ultimately require skin grafting for wound healing difficulties.
Detail Level: Detailed
Note Text (......Xxx Chief Complaint.): This diagnosis correlates with the

## 2020-12-07 ENCOUNTER — APPOINTMENT (OUTPATIENT)
Age: 85
Setting detail: DERMATOLOGY
End: 2020-12-09

## 2020-12-07 DIAGNOSIS — K6811 OTHER POSTOPERATIVE INFECTION: ICD-10-CM

## 2020-12-07 DIAGNOSIS — T814XXA OTHER POSTOPERATIVE INFECTION: ICD-10-CM

## 2020-12-07 PROBLEM — C44.722 SQUAMOUS CELL CARCINOMA OF SKIN OF RIGHT LOWER LIMB, INCLUDING HIP: Status: ACTIVE | Noted: 2020-12-07

## 2020-12-07 PROBLEM — T81.40XA INFECTION FOLLOWING A PROCEDURE, UNSPECIFIED, INITIAL ENCOUNTER: Status: ACTIVE | Noted: 2020-12-07

## 2020-12-07 PROBLEM — C44.619 BASAL CELL CARCINOMA OF SKIN OF LEFT UPPER LIMB, INCLUDING SHOULDER: Status: ACTIVE | Noted: 2020-12-07

## 2020-12-07 PROCEDURE — OTHER TREATMENT REGIMEN: OTHER

## 2020-12-07 PROCEDURE — OTHER DEFER: OTHER

## 2020-12-07 PROCEDURE — OTHER COUNSELING: OTHER

## 2020-12-07 PROCEDURE — OTHER PRESCRIPTION: OTHER

## 2020-12-07 PROCEDURE — 99213 OFFICE O/P EST LOW 20 MIN: CPT

## 2020-12-07 PROCEDURE — OTHER ORDER TESTS: OTHER

## 2020-12-07 PROCEDURE — OTHER OTHER: OTHER

## 2020-12-07 RX ORDER — CEPHALEXIN 500 MG/1
CAPSULE ORAL
Qty: 14 | Refills: 0 | Status: ERX | COMMUNITY
Start: 2020-12-07

## 2020-12-07 ASSESSMENT — LOCATION ZONE DERM: LOCATION ZONE: LEG

## 2020-12-07 ASSESSMENT — LOCATION DETAILED DESCRIPTION DERM: LOCATION DETAILED: RIGHT DISTAL PRETIBIAL REGION

## 2020-12-07 ASSESSMENT — LOCATION SIMPLE DESCRIPTION DERM: LOCATION SIMPLE: RIGHT PRETIBIAL REGION

## 2020-12-07 NOTE — PROCEDURE: OTHER
Other (Free Text): THE PATIENT IS ACTIVELY INFLAMED AND HAS DISCOMFORT FROM THE PRIOR SURGICAL SITE DISTALLY - WHICH IS HEALING BY SECOND INTENT.\\n\\nHE HAS NOT BEEN WEARING COMPRESSION STOCKINGS, AND HAS NOT USED ABX OINTMENT.\\n\\nTHE PATIENT WAS INSTRUCTED TO DEFER TREATMENT OF THIS FOR A FEW WEEKS TO MONTHS UNTIL THE OTHER SITE IS NOT PAINFUL, HEALING WELL - AND THEN WE CAN PROCEED.\\n
Detail Level: Detailed
Note Text (......Xxx Chief Complaint.): This diagnosis correlates with the
Other (Free Text): PATIENT HAS A VERY LARGE ILL DEFINED LESION AND HIS SKIN IS QUITE THIN WITH TREMENDOUS ATROPHY BRUISING AND I'M CONCERNED THAT IF WE PERFORM AN EXCISION HE WILL HAVE LARGER THAN NEEDED MARGINS REMOVED.\\n\\nDEFER - SCHEDULE MOHS.

## 2020-12-07 NOTE — PROCEDURE: TREATMENT REGIMEN
Detail Level: Detailed
Otc Regimen: Bacitracin oint twice daily until healed\\nVinegar soaks\\nCompression stockings

## 2020-12-07 NOTE — PROCEDURE: COUNSELING
Detail Level: Detailed
Patient Specific Counseling (Will Not Stick From Patient To Patient): \\n\\n\\nTHE PATIENT HAS NOT BEEN WEARING COMPRESSION - \\nI ADVISED HIM TO FOLLOW THE WOUND CARE INSTRUCTIONS ON THE SHEET GIVEN TODAY.\\nHE WILL CALL AND KEEP US POSTED ON HOW HE IS DOING - \\n\\nPATIENT MUST LET US KNOW ABOUT THE ABX AND IF THERE IS IMPROVEMENT OF THE PAIN\\n

## 2020-12-07 NOTE — PROCEDURE: DEFER
Introduction Text (Please End With A Colon): :
Detail Level: Detailed
Introduction Text (Please End With A Colon): :
Procedure To Be Performed At Next Visit: Mohs surgery
Instructions (Optional): Rescheduled for Feb. 2021
Procedure To Be Performed At Next Visit: Excision

## 2020-12-23 ENCOUNTER — APPOINTMENT (OUTPATIENT)
Age: 85
Setting detail: DERMATOLOGY
End: 2020-12-31

## 2020-12-23 ENCOUNTER — APPOINTMENT (OUTPATIENT)
Age: 85
Setting detail: DERMATOLOGY
End: 2020-12-24

## 2020-12-23 DIAGNOSIS — S31000A OPEN WOUND(S) (MULTIPLE) OF UNSPECIFIED SITE(S), WITHOUT MENTION OF COMPLICATION: ICD-10-CM

## 2020-12-23 DIAGNOSIS — Z48.1 ENCOUNTER FOR PLANNED POSTPROCEDURAL WOUND CLOSURE: ICD-10-CM

## 2020-12-23 PROBLEM — C44.619 BASAL CELL CARCINOMA OF SKIN OF LEFT UPPER LIMB, INCLUDING SHOULDER: Status: ACTIVE | Noted: 2020-12-23

## 2020-12-23 PROBLEM — S81.801A UNSPECIFIED OPEN WOUND, RIGHT LOWER LEG, INITIAL ENCOUNTER: Status: ACTIVE | Noted: 2020-12-23

## 2020-12-23 PROCEDURE — OTHER CONSULTATION FOR MOHS SURGERY: OTHER

## 2020-12-23 PROCEDURE — 99213 OFFICE O/P EST LOW 20 MIN: CPT | Mod: 24,57

## 2020-12-23 PROCEDURE — OTHER COUNSELING: OTHER

## 2020-12-23 PROCEDURE — OTHER PRESCRIPTION: OTHER

## 2020-12-23 PROCEDURE — 14021 TIS TRNFR S/A/L 10.1-30 SQCM: CPT

## 2020-12-23 PROCEDURE — OTHER MOHS SURGERY: OTHER

## 2020-12-23 PROCEDURE — OTHER CONSULTATION FOR SURGICAL REPAIR: OTHER

## 2020-12-23 PROCEDURE — 17314 MOHS ADDL STAGE T/A/L: CPT

## 2020-12-23 PROCEDURE — 17313 MOHS 1 STAGE T/A/L: CPT

## 2020-12-23 PROCEDURE — OTHER REPAIR NOTE: OTHER

## 2020-12-23 RX ORDER — MUPIROCIN 20 MG/G
OINTMENT TOPICAL
Qty: 1 | Refills: 0 | Status: ERX | COMMUNITY
Start: 2020-12-23

## 2020-12-23 RX ORDER — CEPHALEXIN 500 MG/1
CAPSULE ORAL
Qty: 21 | Refills: 0 | Status: ERX

## 2020-12-23 ASSESSMENT — LOCATION ZONE DERM
LOCATION ZONE: ARM
LOCATION ZONE: LEG
LOCATION ZONE: LEG

## 2020-12-23 ASSESSMENT — LOCATION DETAILED DESCRIPTION DERM
LOCATION DETAILED: RIGHT DISTAL PRETIBIAL REGION
LOCATION DETAILED: RIGHT DISTAL PRETIBIAL REGION
LOCATION DETAILED: LEFT DISTAL DORSAL FOREARM

## 2020-12-23 ASSESSMENT — LOCATION SIMPLE DESCRIPTION DERM
LOCATION SIMPLE: LEFT FOREARM
LOCATION SIMPLE: RIGHT PRETIBIAL REGION
LOCATION SIMPLE: RIGHT PRETIBIAL REGION

## 2020-12-23 NOTE — PROCEDURE: REPAIR NOTE
Prep Survey      Responses   Facility patient discharged from?  [facility]   Is LACE score < 7 ?  Yes   Is patient eligible?  Yes   Discharge diagnosis  Vertigo   Does the patient have one of the following disease processes/diagnoses(primary or secondary)?  Other   Does the patient have Home health ordered?  No   Is there a DME ordered?  No   Prep survey completed?  Yes          Aracely Guerrero RN         Skin Substitute Paste Text: The defect edges were debeveled with a #15 scalpel blade.  Given the location of the defect, shape of the defect and the proximity to free margins a skin substitute micronized graft was deemed most appropriate.  The entire vial contents were admixed with 0.5ccs of sterile saline, formed into a paste and then evenly spread over the entire wound bed.

## 2020-12-23 NOTE — PROCEDURE: MOHS SURGERY
Subjective:      Pt is a 70 y.o. female who presents with Eye Problem            HPI  This is a new problem. Pt was seen in the UC 2 days ago for these issues and notes treatment is not working. PT presents to UC clinic today complaining of sore throat, watery red eyes, pressure in ears, cough, fatigue, runny nose, post nasal drip, sinus pressure and headache. PT denies CP, SOB, NVD, abdominal pain, joint pain. PT states these symptoms began around 5 days ago. PT states the pain is a 7/10, aching in nature and worse at night.  Pt has not taken any RX medications for this condition. The pt's medication list, problem list, and allergies have been evaluated and reviewed during today's visit.    PMH:  Past Medical History:   Diagnosis Date   • ADD (attention deficit disorder)    • Anxiety    • Arthritis    • CTS (carpal tunnel syndrome)     B/L   • Depression    • History of abdominoplasty 3/15/2012   • Hyperlipidemia    • Hypothyroidism    • Midline low back pain without sciatica 2015   • Osteoporosis 2014   • PATENT FORAMEN OVALE LEFT TO RIGHT SHUNT 2010       PSH:  Past Surgical History:   Procedure Laterality Date   • ABDOMINOPLASTY     • APPENDECTOMY     • OTHER      TUMMY TUCK, BREAST REDUCTION, LIPOSUCTION OF HIPS AND THIGHS.   • PB ANESTH,LOLA HYST FOL NEURAXIAL ANAL/ANES     • PB MAMMARY DUCTOGRAM, SINGLE     • PB REDUCTION OF LARGE BREAST     • TONSILLECTOMY     • TUBAL COAGULATION LAPAROSCOPIC BILATERAL         Fam Hx:    family history includes Arthritis in her mother; Breast Cancer in her sister; Cancer in her father and mother; Cancer (age of onset: 51) in her sister; Heart Disease in her mother; Hypertension in her mother; Other in her mother; Psychiatric Illness in her mother.  Family Status   Relation Name Status   • Mo     • Fa     • Sis  Alive   • Bro  Alive   • Sis  Alive   • Neg Hx  (Not Specified)       Soc HX:  Social History     Socioeconomic History   • Marital  status:      Spouse name: Not on file   • Number of children: Not on file   • Years of education: Not on file   • Highest education level: Not on file   Occupational History   • Not on file   Social Needs   • Financial resource strain: Not on file   • Food insecurity:     Worry: Not on file     Inability: Not on file   • Transportation needs:     Medical: Not on file     Non-medical: Not on file   Tobacco Use   • Smoking status: Never Smoker   • Smokeless tobacco: Never Used   Substance and Sexual Activity   • Alcohol use: Yes     Comment: rare   • Drug use: Yes     Types: Marijuana, Methamphetamines     Comment: marijuana several times a year, 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   • Sexual activity: Not Currently     Birth control/protection: Post-Menopausal   Lifestyle   • Physical activity:     Days per week: Not on file     Minutes per session: Not on file   • Stress: Not on file   Relationships   • Social connections:     Talks on phone: Not on file     Gets together: Not on file     Attends L[employer] service: Not on file     Active member of club or organization: Not on file     Attends meetings of clubs or organizations: Not on file     Relationship status: Not on file   • Intimate partner violence:     Fear of current or ex partner: Not on file     Emotionally abused: Not on file     Physically abused: Not on file     Forced sexual activity: Not on file   Other Topics Concern   • Not on file   Social History Narrative   • Not on file         Medications:    Current Outpatient Medications:   •  ARIPiprazole (ABILIFY) 2 MG tablet, TK 1 T PO QAM, Disp: , Rfl: 2  •  polymixin-trimethoprim (POLYTRIM) 50235-7.1 UNIT/ML-% Solution, Place 1 Drop in both eyes every 4 hours., Disp: 10 mL, Rfl: 0  •  amoxicillin-clavulanate (AUGMENTIN) 875-125 MG Tab, Take 1 Tab by mouth 2 times a day for 7 days., Disp: 14 Tab, Rfl: 0  •  methylPREDNISolone (MEDROL DOSEPAK) 4 MG Tablet Therapy Pack, Follow schedule on  "package instructions., Disp: 21 Tab, Rfl: 0  •  amphetamine-dextroamphetamine ER (ADDERALL XR) 30 MG XR capsule, , Disp: , Rfl:   •  amphetamine-dextroamphetamine (ADDERALL) 10 MG Tab, , Disp: , Rfl:   •  PRISTIQ 100 MG TABLET SR 24 HR, , Disp: , Rfl:   •  zolpidem (AMBIEN) 10 MG TABS, Take 10 mg by mouth at bedtime as needed. Indications: Trouble Sleeping, Disp: , Rfl:       Allergies:  Hydrocodone    ROS  Review of Systems   Constitutional: Positive for malaise/fatigue. Negative for fever.   HENT: Positive for congestion and sore throat from postnasal drip with associated sinus pressure and fullness.    Eyes: Negative for blurred vision, double vision and photophobia. +B/L redness  Respiratory: Positive for cough and sputum production. Negative for hemoptysis, shortness of breath and wheezing.    Cardiovascular: Negative for chest pain and palpitations.   Gastrointestinal: Negative for nausea, vomiting, abdominal pain, diarrhea and constipation.   Genitourinary: Negative for dysuria and flank pain.   Musculoskeletal: Negative for joint pain and myalgias.   Skin: Negative for itching and rash.   Neurological: Positive for headaches. Negative for dizziness and tingling.   Endo/Heme/Allergies: Does not bruise/bleed easily.   Psychiatric/Behavioral: Negative for depression. The patient is not nervous/anxious.           Objective:     /72   Pulse (!) 102   Temp 36.8 °C (98.2 °F) (Temporal)   Resp 17   Ht 1.575 m (5' 2\")   Wt 68 kg (150 lb)   SpO2 98%   BMI 27.44 kg/m²      Physical Exam         Physical Exam   Constitutional: Pt is oriented to person, place, and time. Pt appears well-developed and well-nourished. No distress.   HENT:   Head: Normocephalic and atraumatic.   Right Ear: Hearing, tympanic membrane, external ear and ear canal normal.   Left Ear: Hearing, tympanic membrane, external ear and ear canal normal.   Nose: Mucosal edema, rhinorrhea and sinus tenderness present. No nose lacerations, " nasal deformity, septal deviation or nasal septal hematoma. No epistaxis.  No foreign bodies. Right sinus exhibits maxillary sinus tenderness and frontal sinus tenderness. Left sinus exhibits maxillary sinus tenderness and frontal sinus tenderness.   Mouth/Throat: Oropharynx is clear and moist. No oropharyngeal exudate.   Eyes: Conjunctivae injected and EOM are normal. Pupils are equal, round, and reactive to light. Right eye exhibits discharge. Left eye exhibits discharge.  Neck: Normal range of motion. Neck supple. No tracheal deviation present. No thyromegaly present.   Cardiovascular: Normal rate, regular rhythm, normal heart sounds and intact distal pulses.  Exam reveals no gallop and no friction rub.    No murmur heard.  Pulmonary/Chest: Effort normal and breath sounds normal. No respiratory distress. Pt has no wheezes. Pt has no rales. Pt exhibits no tenderness.   Abdominal: Soft. Bowel sounds are normal. Pt exhibits no distension and no mass. There is no tenderness. There is no rebound and no guarding.   Musculoskeletal: Normal range of motion. Pt exhibits no edema and no tenderness.   Lymphadenopathy:     Pt has no cervical adenopathy.   Neurological: Pt is alert and oriented to person, place, and time. Pt has normal reflexes. Pt displays normal reflexes. No cranial nerve deficit. Pt exhibits normal muscle tone. Coordination normal.   Skin: Skin is warm and dry. No rash noted. No erythema.   Psychiatric: Pt has a normal mood and affect. Pt behavior is normal. Judgment and thought content normal.          Assessment/Plan:     1. Acute non-recurrent pansinusitis    - amoxicillin-clavulanate (AUGMENTIN) 875-125 MG Tab; Take 1 Tab by mouth 2 times a day for 7 days.  Dispense: 14 Tab; Refill: 0  - methylPREDNISolone (MEDROL DOSEPAK) 4 MG Tablet Therapy Pack; Follow schedule on package instructions.  Dispense: 21 Tab; Refill: 0    2. URI with cough and congestion    - amoxicillin-clavulanate (AUGMENTIN) 875-125  MG Tab; Take 1 Tab by mouth 2 times a day for 7 days.  Dispense: 14 Tab; Refill: 0  - methylPREDNISolone (MEDROL DOSEPAK) 4 MG Tablet Therapy Pack; Follow schedule on package instructions.  Dispense: 21 Tab; Refill: 0    3. Acute bacterial conjunctivitis of both eyes    - polymixin-trimethoprim (POLYTRIM) 65329-1.1 UNIT/ML-% Solution; Place 1 Drop in both eyes every 4 hours.  Dispense: 10 mL; Refill: 0      Rest, fluids encouraged.  OTC decongestant for congestion/cough  AVS with medical info given.  Pt was in full understanding and agreement with the plan.  Differential diagnosis, natural history, supportive care, and indications for immediate follow-up discussed. All questions answered. Patient agrees with the plan of care.  Follow-up as needed if symptoms worsen or fail to improve.       Primary Defect Length In Cm (Final Defect Size - Required For Flaps/Grafts): 2.8

## 2020-12-23 NOTE — PROCEDURE: REPAIR NOTE
Date & Time: 1/19/2024, 8:23 PM  Patient: Mica Laws  Encounter Provider(s):    Snehal Ramírez MD       To Whom It May Concern:    Mica Laws was seen and treated in our department on 1/19/2024. She should not return to work until 1/26/2024 .    If you have any questions or concerns, please do not hesitate to call.        _____________________________  Physician/APC Signature            Epidermal Closure Graft Donor Site (Optional): running

## 2020-12-23 NOTE — PROCEDURE: MOHS SURGERY
Spot Size: 3 Consent: Written consent obtained, risks reviewed including but not limited to crusting, scabbing, blistering, scarring, darker or lighter pigmentary change, and/or incomplete removal. External Cooling Fan Speed: 5 Pre-Procedure Text: After consent was obtained and the procedure was explained, all persons present in the exam room put on their protective eyewear. Cold gel was applied to the treatment areas. Post-Care Instructions: I reviewed with the patient in detail post-care instructions. Patient is to apply vaseline with a q-tip to all crusted areas, and avoid picking at any scabs. Pt should stay away from the sun and wear sun protection until fully healed. Laser Type: KTP 532nm Detail Level: Detailed Treatment Number (Will Not Render If 0): 0 Procedural Text: The treatment areas where then treated as noted above. Post-Procedure Text: Following the treatment, ice and broad spectrum sunscreen was applied to the treatment areas.  Post-care instructions were discussed. Hemigard Postcare Instructions: The HEMIGARD strips are to remain completely dry for at least 5-7 days.

## 2020-12-23 NOTE — PROCEDURE: CONSULTATION FOR MOHS SURGERY
Detail Level: Detailed
X Size Of Lesion In Cm (Optional): 1
Incorporate Mauc In Note: Yes
Size Of Lesion: 1.5
Name Of The Referring Provider For Procedure: Claudia Holloway MD

## 2020-12-23 NOTE — PROCEDURE: REPAIR NOTE
Where Do You Want The Question To Include Opioid Counseling Located?: Case Summary Tab not applicable

## 2021-01-06 ENCOUNTER — APPOINTMENT (OUTPATIENT)
Age: 86
Setting detail: DERMATOLOGY
End: 2021-01-07

## 2021-01-06 DIAGNOSIS — T81.89 OTHER COMPLICATIONS OF PROCEDURES, NOT ELSEWHERE CLASSIFIED: ICD-10-CM

## 2021-01-06 DIAGNOSIS — Z48.02 ENCOUNTER FOR REMOVAL OF SUTURES: ICD-10-CM

## 2021-01-06 PROBLEM — T81.89XA OTHER COMPLICATIONS OF PROCEDURES, NOT ELSEWHERE CLASSIFIED, INITIAL ENCOUNTER: Status: ACTIVE | Noted: 2021-01-06

## 2021-01-06 PROCEDURE — 97597 DBRDMT OPN WND 1ST 20 CM/<: CPT

## 2021-01-06 PROCEDURE — OTHER DEBRIDEMENT OF OPEN WOUND: OTHER

## 2021-01-06 PROCEDURE — 99024 POSTOP FOLLOW-UP VISIT: CPT

## 2021-01-06 PROCEDURE — OTHER SUTURE REMOVAL (GLOBAL PERIOD): OTHER

## 2021-01-06 PROCEDURE — OTHER COUNSELING: OTHER

## 2021-01-06 ASSESSMENT — LOCATION ZONE DERM
LOCATION ZONE: LEG
LOCATION ZONE: ARM

## 2021-01-06 ASSESSMENT — LOCATION SIMPLE DESCRIPTION DERM
LOCATION SIMPLE: RIGHT PRETIBIAL REGION
LOCATION SIMPLE: LEFT FOREARM

## 2021-01-06 ASSESSMENT — LOCATION DETAILED DESCRIPTION DERM
LOCATION DETAILED: RIGHT DISTAL PRETIBIAL REGION
LOCATION DETAILED: LEFT PROXIMAL DORSAL FOREARM

## 2021-01-06 NOTE — PROCEDURE: SUTURE REMOVAL (GLOBAL PERIOD)
Detail Level: Detailed
Add 94853 Cpt? (Important Note: In 2017 The Use Of 53236 Is Being Tracked By Cms To Determine Future Global Period Reimbursement For Global Periods): yes

## 2021-01-06 NOTE — PROCEDURE: DEBRIDEMENT OF OPEN WOUND
Anesthesia Volume In Cc (Will Not Render If 0): 5
Detail Level: Detailed
Size Of Wound In Cm2 (Optional): 2
Post-Care Instructions: I reviewed with the patient in detail post-care instructions. Patient is to keep the area dry for 48 hours, and not to engage in any swimming until the area is healed. Should the patient develop any fevers, chills, bleeding, severe pain patient will contact the office immediately.
Anesthesia Type: 1% lidocaine with epinephrine
27-Mar-2020 13:02
Consent was obtained from the patient. The risks, benefits and alternatives to therapy were discussed in detail. Specifically, the risks of infection, scarring, bleeding, prolonged wound healing, nerve injury, and allergy to anesthesia were addressed. Alternatives to debridement, such as aggressive wound care, were also discussed.  Prior to the procedure, the treatment site was clearly identified and confirmed by the patient. All components of Universal Protocol/PAUSE Rule completed.
Procedure: Debridement of wound tissue less than 20sq cm
Additional Anesthesia Volume In Cc (Will Not Render If 0): 3
Render Post-Care Instructions In Note?: no

## 2021-02-10 ENCOUNTER — APPOINTMENT (OUTPATIENT)
Age: 86
Setting detail: DERMATOLOGY
End: 2021-02-16

## 2021-02-10 PROBLEM — C44.722 SQUAMOUS CELL CARCINOMA OF SKIN OF RIGHT LOWER LIMB, INCLUDING HIP: Status: ACTIVE | Noted: 2021-02-10

## 2021-02-10 PROCEDURE — OTHER DEFER: OTHER

## 2021-02-10 PROCEDURE — OTHER COUNSELING: OTHER

## 2021-02-10 NOTE — PROCEDURE: DEFER
Procedure To Be Performed At Next Visit: Mohs surgery
Detail Level: Detailed
Introduction Text (Please End With A Colon): .

## 2021-02-12 ENCOUNTER — APPOINTMENT (OUTPATIENT)
Age: 86
Setting detail: DERMATOLOGY
End: 2021-02-17

## 2021-02-12 ENCOUNTER — TELEPHONE (OUTPATIENT)
Dept: SLEEP MEDICINE | Facility: MEDICAL CENTER | Age: 86
End: 2021-02-12

## 2021-02-12 DIAGNOSIS — G47.33 OSA (OBSTRUCTIVE SLEEP APNEA): ICD-10-CM

## 2021-02-12 PROBLEM — C44.722 SQUAMOUS CELL CARCINOMA OF SKIN OF RIGHT LOWER LIMB, INCLUDING HIP: Status: ACTIVE | Noted: 2021-02-12

## 2021-02-12 PROCEDURE — 13121 CMPLX RPR S/A/L 2.6-7.5 CM: CPT | Mod: 79

## 2021-02-12 PROCEDURE — OTHER MOHS SURGERY: OTHER

## 2021-02-12 PROCEDURE — OTHER CONSULTATION FOR MOHS SURGERY: OTHER

## 2021-02-12 PROCEDURE — OTHER COUNSELING: OTHER

## 2021-02-12 PROCEDURE — 17313 MOHS 1 STAGE T/A/L: CPT | Mod: 79

## 2021-02-12 PROCEDURE — 17314 MOHS ADDL STAGE T/A/L: CPT | Mod: 79

## 2021-02-12 NOTE — PROCEDURE: CONSULTATION FOR MOHS SURGERY
Size Of Lesion: 1
X Size Of Lesion In Cm (Optional): 0.9
Name Of The Referring Provider For Procedure: Claudia Holloway MD
Incorporate Mauc In Note: Yes
Detail Level: Detailed

## 2021-02-12 NOTE — PROCEDURE: MOHS SURGERY
Adequate: hears normal conversation without difficulty Trilobed Flap Text: The defect edges were debeveled with a #15 scalpel blade.  Given the location of the defect and the proximity to free margins a trilobed flap was deemed most appropriate.  Using a sterile surgical marker, an appropriate trilobed flap drawn around the defect.    The area thus outlined was incised deep to adipose tissue with a #15 scalpel blade.  The skin margins were undermined to an appropriate distance in all directions utilizing iris scissors.

## 2021-02-12 NOTE — TELEPHONE ENCOUNTER
[facility] called and LM and have requested a new order for CPAP supplies be called or faxed in.     Routed to Neeta Mejía for review

## 2021-03-04 ENCOUNTER — APPOINTMENT (OUTPATIENT)
Age: 86
Setting detail: DERMATOLOGY
End: 2021-03-15

## 2021-03-04 DIAGNOSIS — Z48.02 ENCOUNTER FOR REMOVAL OF SUTURES: ICD-10-CM

## 2021-03-04 PROCEDURE — OTHER COUNSELING: OTHER

## 2021-03-04 PROCEDURE — OTHER SUTURE REMOVAL (GLOBAL PERIOD): OTHER

## 2021-03-04 ASSESSMENT — LOCATION DETAILED DESCRIPTION DERM: LOCATION DETAILED: RIGHT PROXIMAL PRETIBIAL REGION

## 2021-03-04 ASSESSMENT — LOCATION SIMPLE DESCRIPTION DERM: LOCATION SIMPLE: RIGHT PRETIBIAL REGION

## 2021-03-04 ASSESSMENT — LOCATION ZONE DERM: LOCATION ZONE: LEG

## 2021-03-04 NOTE — PROCEDURE: SUTURE REMOVAL (GLOBAL PERIOD)
Detail Level: Detailed
Add 41147 Cpt? (Important Note: In 2017 The Use Of 37552 Is Being Tracked By Cms To Determine Future Global Period Reimbursement For Global Periods): no

## 2021-05-21 ENCOUNTER — APPOINTMENT (OUTPATIENT)
Age: 86
Setting detail: DERMATOLOGY
End: 2021-05-22

## 2021-05-21 DIAGNOSIS — D22 MELANOCYTIC NEVI: ICD-10-CM

## 2021-05-21 DIAGNOSIS — Z86.006 PERSONAL HISTORY OF MELANOMA IN-SITU: ICD-10-CM

## 2021-05-21 DIAGNOSIS — D485 NEOPLASM OF UNCERTAIN BEHAVIOR OF SKIN: ICD-10-CM

## 2021-05-21 DIAGNOSIS — D18.0 HEMANGIOMA: ICD-10-CM

## 2021-05-21 DIAGNOSIS — L57.0 ACTINIC KERATOSIS: ICD-10-CM

## 2021-05-21 DIAGNOSIS — Z71.89 OTHER SPECIFIED COUNSELING: ICD-10-CM

## 2021-05-21 DIAGNOSIS — Z85.828 PERSONAL HISTORY OF OTHER MALIGNANT NEOPLASM OF SKIN: ICD-10-CM

## 2021-05-21 DIAGNOSIS — L57.8 OTHER SKIN CHANGES DUE TO CHRONIC EXPOSURE TO NONIONIZING RADIATION: ICD-10-CM

## 2021-05-21 DIAGNOSIS — L82.1 OTHER SEBORRHEIC KERATOSIS: ICD-10-CM

## 2021-05-21 PROBLEM — D18.01 HEMANGIOMA OF SKIN AND SUBCUTANEOUS TISSUE: Status: ACTIVE | Noted: 2021-05-21

## 2021-05-21 PROBLEM — D22.5 MELANOCYTIC NEVI OF TRUNK: Status: ACTIVE | Noted: 2021-05-21

## 2021-05-21 PROBLEM — D48.5 NEOPLASM OF UNCERTAIN BEHAVIOR OF SKIN: Status: ACTIVE | Noted: 2021-05-21

## 2021-05-21 PROBLEM — Z85.820 PERSONAL HISTORY OF MALIGNANT MELANOMA OF SKIN: Status: ACTIVE | Noted: 2021-05-21

## 2021-05-21 PROCEDURE — 17000 DESTRUCT PREMALG LESION: CPT | Mod: 59

## 2021-05-21 PROCEDURE — OTHER PRESCRIPTION: OTHER

## 2021-05-21 PROCEDURE — OTHER LIQUID NITROGEN: OTHER

## 2021-05-21 PROCEDURE — 17003 DESTRUCT PREMALG LES 2-14: CPT

## 2021-05-21 PROCEDURE — OTHER OTHER: OTHER

## 2021-05-21 PROCEDURE — OTHER COUNSELING: OTHER

## 2021-05-21 PROCEDURE — 99213 OFFICE O/P EST LOW 20 MIN: CPT | Mod: 25

## 2021-05-21 PROCEDURE — OTHER BIOPSY BY SHAVE METHOD: OTHER

## 2021-05-21 PROCEDURE — 11102 TANGNTL BX SKIN SINGLE LES: CPT

## 2021-05-21 PROCEDURE — OTHER TREATMENT REGIMEN: OTHER

## 2021-05-21 PROCEDURE — 11103 TANGNTL BX SKIN EA SEP/ADDL: CPT

## 2021-05-21 PROCEDURE — OTHER PRESCRIPTION MEDICATION MANAGEMENT: OTHER

## 2021-05-21 RX ORDER — FLUOROURACIL 5 MG/G
CREAM TOPICAL
Qty: 1 | Refills: 1 | Status: ERX

## 2021-05-21 ASSESSMENT — LOCATION DETAILED DESCRIPTION DERM
LOCATION DETAILED: LEFT ANTERIOR DISTAL THIGH
LOCATION DETAILED: LEFT LATERAL MALAR CHEEK
LOCATION DETAILED: LEFT PROXIMAL DORSAL FOREARM
LOCATION DETAILED: SUPERIOR LUMBAR SPINE
LOCATION DETAILED: LEFT ULNAR DORSAL HAND
LOCATION DETAILED: RIGHT MEDIAL EYEBROW
LOCATION DETAILED: RIGHT PROXIMAL PRETIBIAL REGION
LOCATION DETAILED: RIGHT CENTRAL MALAR CHEEK
LOCATION DETAILED: RIGHT RADIAL DORSAL HAND
LOCATION DETAILED: LEFT SUPERIOR PARIETAL SCALP
LOCATION DETAILED: RIGHT PROXIMAL DORSAL FOREARM
LOCATION DETAILED: INFERIOR THORACIC SPINE
LOCATION DETAILED: RIGHT SUPERIOR PARIETAL SCALP
LOCATION DETAILED: LEFT RADIAL DORSAL HAND
LOCATION DETAILED: SUPERIOR THORACIC SPINE
LOCATION DETAILED: RIGHT DISTAL PRETIBIAL REGION
LOCATION DETAILED: EPIGASTRIC SKIN

## 2021-05-21 ASSESSMENT — LOCATION SIMPLE DESCRIPTION DERM
LOCATION SIMPLE: SCALP
LOCATION SIMPLE: UPPER BACK
LOCATION SIMPLE: LEFT THIGH
LOCATION SIMPLE: LEFT FOREARM
LOCATION SIMPLE: ABDOMEN
LOCATION SIMPLE: LEFT CHEEK
LOCATION SIMPLE: RIGHT CHEEK
LOCATION SIMPLE: LEFT HAND
LOCATION SIMPLE: RIGHT HAND
LOCATION SIMPLE: RIGHT EYEBROW
LOCATION SIMPLE: LOWER BACK
LOCATION SIMPLE: RIGHT PRETIBIAL REGION
LOCATION SIMPLE: RIGHT FOREARM

## 2021-05-21 ASSESSMENT — LOCATION ZONE DERM
LOCATION ZONE: SCALP
LOCATION ZONE: ARM
LOCATION ZONE: TRUNK
LOCATION ZONE: HAND
LOCATION ZONE: LEG
LOCATION ZONE: FACE

## 2021-05-21 NOTE — PROCEDURE: LIQUID NITROGEN
Detail Level: Zone
Render Post-Care Instructions In Note?: yes
Post-Care Instructions: I reviewed with the patient in detail post-care instructions. Patient is to wear sunprotection, and avoid picking at any of the treated lesions. Pt may apply Vaseline to crusted or scabbing areas.  Written consent was obtained.
Render Note In Bullet Format When Appropriate: No
Consent: The patient's written consent was obtained including but not limited to risks of crusting, scabbing, blistering, scarring, darker or lighter pigmentary change, recurrence, incomplete removal and infection.

## 2021-05-21 NOTE — PROCEDURE: PRESCRIPTION MEDICATION MANAGEMENT
Detail Level: Zone
Render In Strict Bullet Format?: No
Continue Regimen: efudex weekly to face, add arms.  advised pt to increase to BIW

## 2021-05-21 NOTE — PROCEDURE: BIOPSY BY SHAVE METHOD
Validate Triangulation: No
Depth Of Biopsy: dermis
Additional Anesthesia Volume In Cc (Will Not Render If 0): 0
Biopsy Type: H and E
Hemostasis: Drysol
Wound Care: Vaseline
Type Of Destruction Used: Electrodesiccation and Curettage
Electrodesiccation And Curettage Text: The wound bed was treated with electrodesiccation and curettage after the biopsy was performed.
Information: Selecting Yes will display possible errors in your note based on the variables you have selected. This validation is only offered as a suggestion for you. PLEASE NOTE THAT THE VALIDATION TEXT WILL BE REMOVED WHEN YOU FINALIZE YOUR NOTE. IF YOU WANT TO FAX A PRELIMINARY NOTE YOU WILL NEED TO TOGGLE THIS TO 'NO' IF YOU DO NOT WANT IT IN YOUR FAXED NOTE.
Silver Nitrate Text: The wound bed was treated with silver nitrate after the biopsy was performed.
Biopsy Method: double edge Personna blade
Notification Instructions: Patient will be notified of biopsy results. However, patient instructed to call the office if not contacted within 2 weeks.
Billing Type: Third-Party Bill
Was A Bandage Applied: Yes
Post-Care Instructions: I reviewed with the patient in detail post-care instructions. Patient is to keep the biopsy site dry overnight, and then apply vaseline twice daily until healed. Patient may apply hydrogen peroxide soaks to remove any crusting.
Consent: Written consent was obtained and risks were reviewed including but not limited to scarring, infection, bleeding, scabbing, incomplete removal, nerve damage and allergy to anesthesia.
Electrodesiccation Text: The wound bed was treated with electrodesiccation after the biopsy was performed.
Anesthesia Type: 1% lidocaine with epinephrine and a 1:10 solution of 8.4% sodium bicarbonate
Anesthesia Volume In Cc (Will Not Render If 0): 0.5
Cryotherapy Text: The wound bed was treated with cryotherapy after the biopsy was performed.
Detail Level: Detailed
Dressing: Band-Aid

## 2021-05-21 NOTE — PROCEDURE: COUNSELING
Detail Level: Generalized
Detail Level: Zone
Detail Level: Detailed
When Should The Patient Follow-Up For Their Next Full-Body Skin Exam?: 6 Months
Quality 137: Melanoma: Continuity Of Care - Recall System: Patient information entered into a recall system that includes: target date for the next exam specified AND a process to follow up with patients regarding missed or unscheduled appointments
Quality 224: Stage 0-Iic Melanoma: Overutilization Of Imaging Studies For Only Stage 0-Iic Melanoma: None of the following diagnostic imaging studies ordered: chest X-ray, CT, Ultrasound, MRI, PET, or nuclear medicine scans (ML)

## 2021-05-21 NOTE — PROCEDURE: OTHER
Render Risk Assessment In Note?: yes
Detail Level: Detailed
Other (Free Text): \\nWell Differentiated Squamous Cell Carcinoma | Location: right proximal pretibial region | Biopsy Date: 2020/11/05 | Status: PENDING\\nWell Differentiated Squamous Cell Carcinoma | Location: right distal pretibial region | Biopsy Date: 2020/11/05 | Status: COMPLETED\\nBasal Cell Carcinoma, Nodular Type | Location: A: left distal dorsal forearm | Biopsy Date: 2020/11/05 | Treatment: Mohs | Treatment Date: 2020/12/23 | Status: COMPLETED\\nModerately Differentiated Squamous Cell Carcinoma | Location: right central temple | Biopsy Date: 2020/04/03 | Treatment: Mohs | Treatment Date: 2020/04/20 | Status: COMPLETED\\nSquamous Cell Carcinoma in situ | Location: right lateral inferior chest | Biopsy Date: 2020/02/06 | Treatment: ED&C | Treatment Date: 2020/04/03 | Status: COMPLETED\\nWell Differentiated Squamous Cell Carcinoma | Location: right distal pretibial region | Biopsy Date: 2019/05/02 | Treatment: Excision | Treatment Date: 2019/07/02 | Status: COMPLETED\\nBasal Cell Carcinoma, Nodular Type | Location: right central postauricular skin | Biopsy Date: 2017/11/02 | Treatment: Mohs | Treatment Date: 2017/11/15 | Status: COMPLETED\\nSquamous Cell Carcinoma in situ, cannot exclude underlying invasion | Location: left mid-upper back | Biopsy Date: 2016/12/08 | Treatment: EDC | Treatment Date: 2016/12/28 | Status: COMPLETED\\nBasal Cell Carcinoma | Location: right central postauricular skin | Biopsy Date: 2016/05/05 | Status: COMPLETED\\nWell Differentiated Squamous Cell Carcinoma | Location: right medial proximal pretibial region | Biopsy Date: 2016/05/05 | Status: COMPLETED\\ngranuloma annulare and BCC | Location: right superior lateral lower back | Biopsy Date: 2016/05/05 | Status: COMPLETED
Other (Free Text): Hx of numerous NMSC
Note Text (......Xxx Chief Complaint.): This diagnosis correlates with the

## 2021-06-03 ENCOUNTER — APPOINTMENT (OUTPATIENT)
Age: 86
Setting detail: DERMATOLOGY
End: 2021-06-03

## 2021-06-03 DIAGNOSIS — L28.1 PRURIGO NODULARIS: ICD-10-CM

## 2021-06-03 PROBLEM — C44.519 BASAL CELL CARCINOMA OF SKIN OF OTHER PART OF TRUNK: Status: ACTIVE | Noted: 2021-06-03

## 2021-06-03 PROCEDURE — 17262 DSTRJ MAL LES T/A/L 1.1-2.0: CPT

## 2021-06-03 PROCEDURE — OTHER COUNSELING: OTHER

## 2021-06-03 PROCEDURE — OTHER OTHER: OTHER

## 2021-06-03 PROCEDURE — 99213 OFFICE O/P EST LOW 20 MIN: CPT | Mod: 25

## 2021-06-03 PROCEDURE — OTHER CURETTAGE AND DESTRUCTION: OTHER

## 2021-06-03 ASSESSMENT — LOCATION SIMPLE DESCRIPTION DERM: LOCATION SIMPLE: LEFT HAND

## 2021-06-03 ASSESSMENT — LOCATION ZONE DERM: LOCATION ZONE: HAND

## 2021-06-03 ASSESSMENT — LOCATION DETAILED DESCRIPTION DERM: LOCATION DETAILED: LEFT RADIAL DORSAL HAND

## 2021-06-03 NOTE — PROCEDURE: OTHER
Render Risk Assessment In Note?: yes
Note Text (......Xxx Chief Complaint.): This diagnosis correlates with the
Detail Level: Detailed
Other (Free Text): discussed bx result\\npt admits to chronic picking\\nadvised him to d/c\\nif anything changes over summer, pt advised to see his dermatologist in [place]

## 2021-08-17 ENCOUNTER — APPOINTMENT (OUTPATIENT)
Dept: SLEEP MEDICINE | Facility: MEDICAL CENTER | Age: 86
End: 2021-08-17
Payer: MEDICARE

## 2021-08-23 ENCOUNTER — OFFICE VISIT (OUTPATIENT)
Dept: CARDIOLOGY | Facility: MEDICAL CENTER | Age: 86
End: 2021-08-23
Payer: MEDICARE

## 2021-08-23 VITALS
DIASTOLIC BLOOD PRESSURE: 52 MMHG | BODY MASS INDEX: 20.59 KG/M2 | SYSTOLIC BLOOD PRESSURE: 90 MMHG | HEART RATE: 94 BPM | RESPIRATION RATE: 14 BRPM | OXYGEN SATURATION: 97 % | WEIGHT: 152 LBS | HEIGHT: 72 IN

## 2021-08-23 DIAGNOSIS — I10 ESSENTIAL HYPERTENSION, BENIGN: ICD-10-CM

## 2021-08-23 DIAGNOSIS — Z95.1 S/P CABG (CORONARY ARTERY BYPASS GRAFT): ICD-10-CM

## 2021-08-23 DIAGNOSIS — E78.2 MIXED HYPERLIPIDEMIA: ICD-10-CM

## 2021-08-23 DIAGNOSIS — I25.83 CORONARY ARTERY DISEASE DUE TO LIPID RICH PLAQUE: ICD-10-CM

## 2021-08-23 DIAGNOSIS — N18.4 CKD (CHRONIC KIDNEY DISEASE) STAGE 4, GFR 15-29 ML/MIN (HCC): ICD-10-CM

## 2021-08-23 DIAGNOSIS — I25.10 CORONARY ARTERY DISEASE DUE TO LIPID RICH PLAQUE: ICD-10-CM

## 2021-08-23 DIAGNOSIS — E78.5 DYSLIPIDEMIA: ICD-10-CM

## 2021-08-23 PROCEDURE — 99214 OFFICE O/P EST MOD 30 MIN: CPT | Performed by: INTERNAL MEDICINE

## 2021-08-23 RX ORDER — ROSUVASTATIN CALCIUM 10 MG/1
10 TABLET, COATED ORAL EVERY EVENING
Qty: 90 TABLET | Refills: 3 | Status: SHIPPED | OUTPATIENT
Start: 2021-08-23

## 2021-08-23 RX ORDER — ASCORBIC ACID 500 MG
1000 TABLET ORAL DAILY
COMMUNITY
End: 2022-09-28

## 2021-08-23 RX ORDER — LOSARTAN POTASSIUM 25 MG/1
25 TABLET ORAL DAILY
Qty: 90 TABLET | Refills: 3 | Status: SHIPPED | OUTPATIENT
Start: 2021-08-23

## 2021-08-23 ASSESSMENT — ENCOUNTER SYMPTOMS
SORE THROAT: 0
FOCAL WEAKNESS: 0
DIZZINESS: 0
CHILLS: 0
NAUSEA: 0
WEAKNESS: 0
CLAUDICATION: 0
ABDOMINAL PAIN: 0
SHORTNESS OF BREATH: 0
FALLS: 0
PND: 0
BLURRED VISION: 0
PALPITATIONS: 0
BRUISES/BLEEDS EASILY: 0
COUGH: 0
FEVER: 0

## 2021-08-23 NOTE — PROGRESS NOTES
Chief Complaint   Patient presents with   • Coronary Artery Disease     F/V Dx: Coronary artery disease due to lipid rich plaque   • Dyslipidemia   • Hypertension     F/V Dx: Essential hypertension, benign       Subjective     Juanito Marquez is a 86 y.o. male who presents today for follow-up of his history of CABG with diabetes    He been doing well the last year    Breathing okay last stress test was in 2019 which fit with prior testing    Past Medical History:   Diagnosis Date   • Back pain 6/23/2009   • Breath shortness     with altitude over 7300 feet   • CAD (coronary artery disease)     CABG 1994. Cardiogy with [facility].   • Cataract     Bilateral phaco with IOL   • Chronic diarrhea 7/21/2008   • CKD (chronic kidney disease) stage 3, GFR 30-59 ml/min (HCC)    • Colon polyp 2007   • Disorder of thyroid    • DM (diabetes mellitus) (HCC)     6/24/19-Avg AM glucose=130-180, oral meds    • Hemorrhagic disorder (HCC)     bleeds easily due to ASA   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   • Sleep apnea     CPAP   • Snoring    • status post CABG 1994   • Status post cholecystectomy 2004     Past Surgical History:   Procedure Laterality Date   • INGUINAL HERNIA REPAIR Right 8/15/2019    Procedure: REPAIR, HERNIA, INGUINAL;  Surgeon: Ciro Ferguson M.D.;  Location[facility];  Service: General   • BASAL CELL EXCISION Right 7/2/2019    Procedure: EXCISION, CARCINOMA, BASAL CELL- FOR SQUAMOUS CELL DISTAL PRETIBIAL REGION;  Surgeon: Larry Tobin M.D.;  Location: [facility];  Service: Plastics   • SPLIT THICKNESS SKIN GRAFT Right 7/2/2019    Procedure: APPLICATION, GRAFT, SKIN, SPLIT-THICKNESS;  Surgeon: Larry Tobin M.D.;  Location: [facility];  Service: Plastics   • CATARACT PHACO WITH IOL  Bilateral 2016   • COLONOSCOPY  2014   • LUMBAR FUSION POSTERIOR  2008    L2-L3, L4-L5   • MULTIPLE CORONARY ARTERY BYPASS  1994    6 vessel   • CERVICAL DISK AND FUSION ANTERIOR  1987   • VASECTOMY  1980    Reversal   • TONSILLECTOMY  1955   • COLECTOMY  early 2004    right FOR BENIGN ADENOMA with cholecystectomy     Family History   Problem Relation Age of Onset   • Cancer Mother         breast   • Heart Disease Mother    • Diabetes Father    • Cancer Father         bladder   • Other Father         ruptured appendix   • Diabetes Paternal Grandfather      Social History     Socioeconomic History   • Marital status:      Spouse name: Not on file   • Number of children: Not on file   • Years of education: Not on file   • Highest education level: Not on file   Occupational History   • Not on file   Tobacco Use   • Smoking status: Never Smoker   • Smokeless tobacco: Never Used   Vaping Use   • Vaping Use: Never used   Substance and Sexual Activity   • Alcohol use: Yes     Alcohol/week: 2.4 oz     Types: 4 Glasses of wine per week     Comment: 1 per day   • Drug use: No   • Sexual activity: Not Currently   Other Topics Concern   • Not on file   Social History Narrative   • Not on file     Social Determinants of Health     Financial Resource Strain:    • Difficulty of Paying Living Expenses:    Food Insecurity:    • Worried About Running Out of Food in the Last Year:    • Ran Out of Food in the Last Year:    Transportation Needs:    • Lack of Transportation (Medical):    • Lack of Transportation (Non-Medical):    Physical Activity:    • Days of Exercise per Week:    • Minutes of Exercise per Session:    Stress:    • Feeling of Stress :    Social Connections:    • Frequency of Communication with Friends and Family:    • Frequency of Social Gatherings with Friends and Family:    • Attends Congregational Services:    • Active Member of Clubs or Organizations:    • Attends Club or Organization Meetings:    • Marital  Status:    Intimate Partner Violence:    • Fear of Current or Ex-Partner:    • Emotionally Abused:    • Physically Abused:    • Sexually Abused:      No Known Allergies  Outpatient Encounter Medications as of 8/23/2021   Medication Sig Dispense Refill   • cyanocobalamin (VITAMIN B12) 500 MCG tablet Take 500 mcg by mouth every day.     • ascorbic acid (VITAMIN C) 500 MG tablet Take 1,000 mg by mouth every day.     • glyBURIDE (DIABETA) 1.25 MG tablet Take 1.25 mg by mouth.     • ferrous sulfate 325 (65 Fe) MG EC tablet Take 1 Tab by mouth 2 times a day. 180 Tab 3   • glimepiride (AMARYL) 1 MG tablet Take 1 mg by mouth 2 times a day.     • olmesartan (BENICAR) 40 MG Tab Take 20 mg by mouth every evening.     • triamcinolone (NASACORT ALLERGY 24HR) 55 MCG/ACT nasal inhaler Spray 2 Sprays in nose every day.     • Coenzyme Q10 (CO Q10) 100 MG Cap Take 2 Caps by mouth 2 Times a Day.     • Empagliflozin 10 MG Tab Take 1 tablet by mouth every morning with breakfast. 90 Tab 3   • Exenatide ER (BYDUREON) 2 MG Pen-injector Inject 2 mg as instructed every 7 days. 12 Each 3   • levothyroxine (SYNTHROID) 100 MCG Tab Take 1 Tab by mouth Every morning on an empty stomach. 90 Tab 3   • niacinamide 500 MG tablet Take 500 mg by mouth 2 Times a Day.     • rosuvastatin (CRESTOR) 10 MG Tab Take 1 Tab by mouth every evening. 90 Tab 3   • Ascorbic Acid (VITAMIN C) 1000 MG Tab Take 1,000 mg by mouth every day.     • Omega-3 Fatty Acids (FISH OIL) 1200 MG CAPS Take 2 Caps by mouth every day.     • Cholecalciferol (VITAMIN D) 2000 UNIT TABS Take 2 Tabs by mouth every day.     • aspirin 81 MG tablet Take 81 mg by mouth every day.       No facility-administered encounter medications on file as of 8/23/2021.     Review of Systems   Constitutional: Negative for chills and fever.   HENT: Negative for sore throat.    Eyes: Negative for blurred vision.   Respiratory: Negative for cough and shortness of breath.    Cardiovascular: Negative for chest  pain, palpitations, claudication, leg swelling and PND.   Gastrointestinal: Negative for abdominal pain and nausea.   Musculoskeletal: Negative for falls and joint pain.   Skin: Negative for rash.   Neurological: Negative for dizziness, focal weakness and weakness.   Endo/Heme/Allergies: Does not bruise/bleed easily.              Objective     BP (!) 90/52 (BP Location: Left arm, Patient Position: Sitting, BP Cuff Size: Adult)   Pulse 94   Resp 14   Ht 1.829 m (6')   Wt 68.9 kg (152 lb)   SpO2 97%   BMI 20.61 kg/m²     Physical Exam   Constitutional: No distress.   Eyes: No scleral icterus.   Neck: No JVD present.   Cardiovascular: Normal rate and normal heart sounds. Exam reveals no gallop and no friction rub.   No murmur heard.  Pulmonary/Chest: No respiratory distress. He has no wheezes. He has no rales.   Abdominal: Soft. Bowel sounds are normal.   Neurological: He is alert.   Skin: No rash noted. He is not diaphoretic.            We reviewed in person the most recent labs    Labs from July creatinine 2.35 LDL 46 A1c 6.8    Assessment & Plan     1. Essential hypertension, benign     2. Dyslipidemia     3. S/P CABG (coronary artery bypass graft)     4. Coronary artery disease due to lipid rich plaque         Medical Decision Making: Today's Assessment/Status/Plan:          It was my pleasure to meet with Mr. Marquez.    We addressed the management of hypertension at today's visit. Blood pressure is well controlled.  We specifically assessed the labs on hypertension treatment  Switching to losartan for better titration of dose target BP >120 given progressive LEAH    We addressed the management of dyslipidemia at today's visit. He is on appropriate statin.    I will see Mr. Marquez back in 1 year time and encouraged him to follow up with us over the phone or electronically using my MyChart as issues arise.    It is my pleasure to participate in the care of Mr. Marquez.  Please do not hesitate to contact me with  questions or concerns.    Ulices Mcintyre MD PhD [facility]  Cardiologist [facility]    Please note that this dictation was created using voice recognition software. There may be errors I did not discover before finalizing the note.

## 2021-09-03 NOTE — PROCEDURE: MOHS SURGERY
Area M Indication Text: Tumors in this location are included in Area M (cheek, forehead, scalp, neck, jawline and pretibial skin).  Mohs surgery is indicated for tumors in these anatomic locations. H Plasty Text: Given the location of the defect, shape of the defect and the proximity to free margins a H-plasty was deemed most appropriate for repair.  Using a sterile surgical marker, the appropriate advancement arms of the H-plasty were drawn incorporating the defect and placing the expected incisions within the relaxed skin tension lines where possible. The area thus outlined was incised deep to adipose tissue with a #15 scalpel blade. The skin margins were undermined to an appropriate distance in all directions utilizing iris scissors.  The opposing advancement arms were then advanced into place in opposite direction and anchored with interrupted buried subcutaneous sutures.

## 2021-11-12 ENCOUNTER — APPOINTMENT (OUTPATIENT)
Age: 86
Setting detail: DERMATOLOGY
End: 2021-11-12

## 2021-11-12 DIAGNOSIS — L57.0 ACTINIC KERATOSIS: ICD-10-CM

## 2021-11-12 DIAGNOSIS — L82.1 OTHER SEBORRHEIC KERATOSIS: ICD-10-CM

## 2021-11-12 DIAGNOSIS — D22 MELANOCYTIC NEVI: ICD-10-CM

## 2021-11-12 DIAGNOSIS — L57.8 OTHER SKIN CHANGES DUE TO CHRONIC EXPOSURE TO NONIONIZING RADIATION: ICD-10-CM

## 2021-11-12 DIAGNOSIS — Z85.828 PERSONAL HISTORY OF OTHER MALIGNANT NEOPLASM OF SKIN: ICD-10-CM

## 2021-11-12 DIAGNOSIS — Z86.006 PERSONAL HISTORY OF MELANOMA IN-SITU: ICD-10-CM

## 2021-11-12 DIAGNOSIS — Z71.89 OTHER SPECIFIED COUNSELING: ICD-10-CM

## 2021-11-12 DIAGNOSIS — D18.0 HEMANGIOMA: ICD-10-CM

## 2021-11-12 PROBLEM — D18.01 HEMANGIOMA OF SKIN AND SUBCUTANEOUS TISSUE: Status: ACTIVE | Noted: 2021-11-12

## 2021-11-12 PROBLEM — D22.5 MELANOCYTIC NEVI OF TRUNK: Status: ACTIVE | Noted: 2021-11-12

## 2021-11-12 PROBLEM — Z85.820 PERSONAL HISTORY OF MALIGNANT MELANOMA OF SKIN: Status: ACTIVE | Noted: 2021-11-12

## 2021-11-12 PROCEDURE — OTHER COUNSELING: OTHER

## 2021-11-12 PROCEDURE — OTHER TREATMENT REGIMEN: OTHER

## 2021-11-12 PROCEDURE — 99213 OFFICE O/P EST LOW 20 MIN: CPT | Mod: 25

## 2021-11-12 PROCEDURE — OTHER OTHER: OTHER

## 2021-11-12 PROCEDURE — 17003 DESTRUCT PREMALG LES 2-14: CPT

## 2021-11-12 PROCEDURE — 17000 DESTRUCT PREMALG LESION: CPT

## 2021-11-12 PROCEDURE — OTHER LIQUID NITROGEN: OTHER

## 2021-11-12 PROCEDURE — OTHER PRESCRIPTION MEDICATION MANAGEMENT: OTHER

## 2021-11-12 ASSESSMENT — LOCATION DETAILED DESCRIPTION DERM
LOCATION DETAILED: LEFT ANTERIOR PROXIMAL THIGH
LOCATION DETAILED: RIGHT ANTERIOR PROXIMAL THIGH
LOCATION DETAILED: LEFT ANTECUBITAL SKIN
LOCATION DETAILED: RIGHT DISTAL DORSAL FOREARM
LOCATION DETAILED: LEFT DISTAL PRETIBIAL REGION
LOCATION DETAILED: LEFT DISTAL DORSAL FOREARM
LOCATION DETAILED: MID TRAPEZIAL NECK
LOCATION DETAILED: SUPERIOR THORACIC SPINE
LOCATION DETAILED: RIGHT MID-UPPER BACK
LOCATION DETAILED: EPIGASTRIC SKIN
LOCATION DETAILED: RIGHT CENTRAL MALAR CHEEK
LOCATION DETAILED: LEFT PROXIMAL DORSAL FOREARM
LOCATION DETAILED: LEFT ANTERIOR DISTAL THIGH
LOCATION DETAILED: LEFT INFERIOR CENTRAL MALAR CHEEK
LOCATION DETAILED: RIGHT PROXIMAL DORSAL FOREARM
LOCATION DETAILED: RIGHT RADIAL DORSAL HAND
LOCATION DETAILED: RIGHT MEDIAL EYEBROW
LOCATION DETAILED: RIGHT MEDIAL INFERIOR CHEST
LOCATION DETAILED: LEFT VENTRAL PROXIMAL FOREARM
LOCATION DETAILED: INFERIOR THORACIC SPINE
LOCATION DETAILED: LEFT ULNAR DORSAL HAND
LOCATION DETAILED: LEFT LATERAL MALAR CHEEK
LOCATION DETAILED: RIGHT DISTAL PRETIBIAL REGION
LOCATION DETAILED: LEFT CENTRAL MALAR CHEEK
LOCATION DETAILED: RIGHT PROXIMAL PRETIBIAL REGION

## 2021-11-12 ASSESSMENT — LOCATION SIMPLE DESCRIPTION DERM
LOCATION SIMPLE: LEFT HAND
LOCATION SIMPLE: RIGHT UPPER BACK
LOCATION SIMPLE: RIGHT FOREARM
LOCATION SIMPLE: RIGHT CHEEK
LOCATION SIMPLE: RIGHT HAND
LOCATION SIMPLE: ABDOMEN
LOCATION SIMPLE: CHEST
LOCATION SIMPLE: UPPER BACK
LOCATION SIMPLE: TRAPEZIAL NECK
LOCATION SIMPLE: RIGHT THIGH
LOCATION SIMPLE: LEFT FOREARM
LOCATION SIMPLE: LEFT THIGH
LOCATION SIMPLE: RIGHT EYEBROW
LOCATION SIMPLE: RIGHT PRETIBIAL REGION
LOCATION SIMPLE: LEFT ELBOW
LOCATION SIMPLE: LEFT CHEEK
LOCATION SIMPLE: LEFT PRETIBIAL REGION

## 2021-11-12 ASSESSMENT — LOCATION ZONE DERM
LOCATION ZONE: ARM
LOCATION ZONE: LEG
LOCATION ZONE: HAND
LOCATION ZONE: FACE
LOCATION ZONE: TRUNK
LOCATION ZONE: NECK

## 2021-11-12 NOTE — PROCEDURE: LIQUID NITROGEN
Application Tool (Optional): Liquid Nitrogen Sprayer
Render Note In Bullet Format When Appropriate: No
Render Post-Care Instructions In Note?: yes
Consent: The patient's written consent was obtained including but not limited to risks of crusting, scabbing, blistering, scarring, darker or lighter pigmentary change, recurrence, incomplete removal and infection.
Number Of Freeze-Thaw Cycles: 2 freeze-thaw cycles
Detail Level: Detailed
Duration Of Freeze Thaw-Cycle (Seconds): 3
Post-Care Instructions: I reviewed with the patient in detail post-care instructions. Patient is to wear sunprotection, and avoid picking at any of the treated lesions. Pt may apply Vaseline to crusted or scabbing areas.  Written consent was obtained.

## 2021-11-12 NOTE — PROCEDURE: OTHER
Other (Free Text): Hx of numerous NMSC
Note Text (......Xxx Chief Complaint.): This diagnosis correlates with the
Other (Free Text): \\nWell Differentiated Squamous Cell Carcinoma | Location: right proximal pretibial region | Biopsy Date: 2020/11/05 | Status: PENDING\\nWell Differentiated Squamous Cell Carcinoma | Location: right distal pretibial region | Biopsy Date: 2020/11/05 | Status: COMPLETED\\nBasal Cell Carcinoma, Nodular Type | Location: A: left distal dorsal forearm | Biopsy Date: 2020/11/05 | Treatment: Mohs | Treatment Date: 2020/12/23 | Status: COMPLETED\\nModerately Differentiated Squamous Cell Carcinoma | Location: right central temple | Biopsy Date: 2020/04/03 | Treatment: Mohs | Treatment Date: 2020/04/20 | Status: COMPLETED\\nSquamous Cell Carcinoma in situ | Location: right lateral inferior chest | Biopsy Date: 2020/02/06 | Treatment: ED&C | Treatment Date: 2020/04/03 | Status: COMPLETED\\nWell Differentiated Squamous Cell Carcinoma | Location: right distal pretibial region | Biopsy Date: 2019/05/02 | Treatment: Excision | Treatment Date: 2019/07/02 | Status: COMPLETED\\nBasal Cell Carcinoma, Nodular Type | Location: right central postauricular skin | Biopsy Date: 2017/11/02 | Treatment: Mohs | Treatment Date: 2017/11/15 | Status: COMPLETED\\nSquamous Cell Carcinoma in situ, cannot exclude underlying invasion | Location: left mid-upper back | Biopsy Date: 2016/12/08 | Treatment: EDC | Treatment Date: 2016/12/28 | Status: COMPLETED\\nBasal Cell Carcinoma | Location: right central postauricular skin | Biopsy Date: 2016/05/05 | Status: COMPLETED\\nWell Differentiated Squamous Cell Carcinoma | Location: right medial proximal pretibial region | Biopsy Date: 2016/05/05 | Status: COMPLETED\\ngranuloma annulare and BCC | Location: right superior lateral lower back | Biopsy Date: 2016/05/05 | Status: COMPLETED
Render Risk Assessment In Note?: yes
Detail Level: Detailed

## 2021-11-12 NOTE — PROCEDURE: PRESCRIPTION MEDICATION MANAGEMENT
Render In Strict Bullet Format?: No
Continue Regimen: efudex weekly to face, add arms.  advised pt to increase to BIW
Detail Level: Zone

## 2021-11-12 NOTE — PROCEDURE: COUNSELING
Detail Level: Zone
Quality 137: Melanoma: Continuity Of Care - Recall System: Patient information entered into a recall system that includes: target date for the next exam specified AND a process to follow up with patients regarding missed or unscheduled appointments
Detail Level: Generalized
Quality 224: Stage 0-Iic Melanoma: Overutilization Of Imaging Studies For Only Stage 0-Iic Melanoma: None of the following diagnostic imaging studies ordered: chest X-ray, CT, Ultrasound, MRI, PET, or nuclear medicine scans (ML)
Detail Level: Detailed
When Should The Patient Follow-Up For Their Next Full-Body Skin Exam?: 6 Months

## 2021-11-24 NOTE — PROCEDURE: MOHS SURGERY
Where Do You Want The Question To Include Opioid Counseling Located?: Case Summary Tab Cheiloplasty (Complex) Text: A decision was made to reconstruct the defect with a  cheiloplasty.  The defect was undermined extensively.  Additional obicularis oris muscle was excised with a 15 blade scalpel.  The defect was converted into a full thickness wedge to facilite a better cosmetic result.  Small vessels were then tied off with 5-0 monocyrl. The obicularis oris, superficial fascia, adipose and dermis were then reapproximated.  After the deeper layers were approximated the epidermis was reapproximated with particular care given to realign the vermilion border.

## 2022-02-18 ENCOUNTER — APPOINTMENT (OUTPATIENT)
Age: 87
Setting detail: DERMATOLOGY
End: 2022-02-18

## 2022-02-18 DIAGNOSIS — Z71.89 OTHER SPECIFIED COUNSELING: ICD-10-CM

## 2022-02-18 DIAGNOSIS — D18.0 HEMANGIOMA: ICD-10-CM

## 2022-02-18 DIAGNOSIS — Z86.006 PERSONAL HISTORY OF MELANOMA IN-SITU: ICD-10-CM

## 2022-02-18 DIAGNOSIS — L57.8 OTHER SKIN CHANGES DUE TO CHRONIC EXPOSURE TO NONIONIZING RADIATION: ICD-10-CM

## 2022-02-18 DIAGNOSIS — Z85.828 PERSONAL HISTORY OF OTHER MALIGNANT NEOPLASM OF SKIN: ICD-10-CM

## 2022-02-18 DIAGNOSIS — L57.0 ACTINIC KERATOSIS: ICD-10-CM

## 2022-02-18 DIAGNOSIS — L82.1 OTHER SEBORRHEIC KERATOSIS: ICD-10-CM

## 2022-02-18 PROBLEM — D18.01 HEMANGIOMA OF SKIN AND SUBCUTANEOUS TISSUE: Status: ACTIVE | Noted: 2022-02-18

## 2022-02-18 PROCEDURE — 99213 OFFICE O/P EST LOW 20 MIN: CPT | Mod: 25

## 2022-02-18 PROCEDURE — 17004 DESTROY PREMAL LESIONS 15/>: CPT

## 2022-02-18 PROCEDURE — OTHER LIQUID NITROGEN: OTHER

## 2022-02-18 PROCEDURE — OTHER COUNSELING: OTHER

## 2022-02-18 PROCEDURE — OTHER OTHER: OTHER

## 2022-02-18 PROCEDURE — OTHER PRESCRIPTION: OTHER

## 2022-02-18 RX ORDER — FLUOROURACIL 5 MG/G
CREAM TOPICAL
Qty: 40 | Refills: 2 | Status: ERX | COMMUNITY
Start: 2022-02-18

## 2022-02-18 ASSESSMENT — LOCATION DETAILED DESCRIPTION DERM
LOCATION DETAILED: RIGHT DISTAL POSTERIOR UPPER ARM
LOCATION DETAILED: RIGHT ANTERIOR DISTAL THIGH
LOCATION DETAILED: LEFT MENTAL CREASE
LOCATION DETAILED: RIGHT ANTERIOR PROXIMAL UPPER ARM
LOCATION DETAILED: RIGHT RADIAL DORSAL HAND
LOCATION DETAILED: RIGHT ULNAR DORSAL HAND
LOCATION DETAILED: STERNUM
LOCATION DETAILED: RIGHT PROXIMAL CALF
LOCATION DETAILED: RIGHT ANTERIOR PROXIMAL THIGH
LOCATION DETAILED: RIGHT PROXIMAL PRETIBIAL REGION
LOCATION DETAILED: LEFT PROXIMAL DORSAL FOREARM
LOCATION DETAILED: RIGHT SUPERIOR MEDIAL FOREHEAD
LOCATION DETAILED: LEFT DISTAL POSTERIOR THIGH
LOCATION DETAILED: LEFT MEDIAL MALAR CHEEK
LOCATION DETAILED: LEFT INFERIOR CENTRAL MALAR CHEEK
LOCATION DETAILED: LEFT ANTERIOR DISTAL THIGH
LOCATION DETAILED: LEFT PROXIMAL CALF
LOCATION DETAILED: LEFT MEDIAL FRONTAL SCALP
LOCATION DETAILED: LEFT DISTAL POSTERIOR UPPER ARM
LOCATION DETAILED: RIGHT INFERIOR CENTRAL MALAR CHEEK
LOCATION DETAILED: LEFT PROXIMAL PRETIBIAL REGION
LOCATION DETAILED: RIGHT MEDIAL INFERIOR CHEST
LOCATION DETAILED: LEFT ANTERIOR DISTAL UPPER ARM
LOCATION DETAILED: RIGHT DISTAL DORSAL FOREARM
LOCATION DETAILED: RIGHT PROXIMAL DORSAL FOREARM
LOCATION DETAILED: LEFT DISTAL DORSAL FOREARM
LOCATION DETAILED: LEFT PROXIMAL POSTERIOR UPPER ARM
LOCATION DETAILED: LEFT VENTRAL PROXIMAL FOREARM
LOCATION DETAILED: LEFT DISTAL CALF
LOCATION DETAILED: RIGHT CENTRAL MALAR CHEEK
LOCATION DETAILED: PERIUMBILICAL SKIN
LOCATION DETAILED: LEFT MEDIAL UPPER BACK
LOCATION DETAILED: LEFT ANTERIOR PROXIMAL THIGH
LOCATION DETAILED: SUPERIOR LUMBAR SPINE
LOCATION DETAILED: RIGHT ANTERIOR DISTAL UPPER ARM
LOCATION DETAILED: MID-FRONTAL SCALP
LOCATION DETAILED: RIGHT DISTAL POSTERIOR THIGH
LOCATION DETAILED: LEFT SUPERIOR PARIETAL SCALP
LOCATION DETAILED: RIGHT POPLITEAL SKIN
LOCATION DETAILED: INFERIOR THORACIC SPINE
LOCATION DETAILED: RIGHT VENTRAL PROXIMAL FOREARM
LOCATION DETAILED: RIGHT DISTAL CALF
LOCATION DETAILED: LEFT DISTAL PRETIBIAL REGION
LOCATION DETAILED: RIGHT MEDIAL FOREHEAD

## 2022-02-18 ASSESSMENT — LOCATION SIMPLE DESCRIPTION DERM
LOCATION SIMPLE: RIGHT POSTERIOR THIGH
LOCATION SIMPLE: RIGHT HAND
LOCATION SIMPLE: RIGHT FOREARM
LOCATION SIMPLE: LEFT UPPER ARM
LOCATION SIMPLE: RIGHT FOREHEAD
LOCATION SIMPLE: ABDOMEN
LOCATION SIMPLE: LEFT POSTERIOR THIGH
LOCATION SIMPLE: LEFT THIGH
LOCATION SIMPLE: LEFT SCALP
LOCATION SIMPLE: LOWER BACK
LOCATION SIMPLE: RIGHT CALF
LOCATION SIMPLE: LEFT FOREARM
LOCATION SIMPLE: ANTERIOR SCALP
LOCATION SIMPLE: SCALP
LOCATION SIMPLE: CHEST
LOCATION SIMPLE: RIGHT PRETIBIAL REGION
LOCATION SIMPLE: RIGHT CHEEK
LOCATION SIMPLE: LEFT CHEEK
LOCATION SIMPLE: LEFT PRETIBIAL REGION
LOCATION SIMPLE: UPPER BACK
LOCATION SIMPLE: CHIN
LOCATION SIMPLE: RIGHT THIGH
LOCATION SIMPLE: RIGHT UPPER ARM
LOCATION SIMPLE: LEFT UPPER BACK
LOCATION SIMPLE: LEFT CALF
LOCATION SIMPLE: RIGHT POPLITEAL SKIN

## 2022-02-18 ASSESSMENT — LOCATION ZONE DERM
LOCATION ZONE: FACE
LOCATION ZONE: ARM
LOCATION ZONE: HAND
LOCATION ZONE: SCALP
LOCATION ZONE: LEG
LOCATION ZONE: TRUNK

## 2022-02-18 NOTE — PROCEDURE: OTHER
Render Risk Assessment In Note?: yes
Detail Level: Detailed
Note Text (......Xxx Chief Complaint.): This diagnosis correlates with the
Other (Free Text): Hx of numerous NMSC
Other (Free Text): \\nWell Differentiated Squamous Cell Carcinoma | Location: right proximal pretibial region | Biopsy Date: 2020/11/05 | Status: PENDING\\nWell Differentiated Squamous Cell Carcinoma | Location: right distal pretibial region | Biopsy Date: 2020/11/05 | Status: COMPLETED\\nBasal Cell Carcinoma, Nodular Type | Location: A: left distal dorsal forearm | Biopsy Date: 2020/11/05 | Treatment: Mohs | Treatment Date: 2020/12/23 | Status: COMPLETED\\nModerately Differentiated Squamous Cell Carcinoma | Location: right central temple | Biopsy Date: 2020/04/03 | Treatment: Mohs | Treatment Date: 2020/04/20 | Status: COMPLETED\\nSquamous Cell Carcinoma in situ | Location: right lateral inferior chest | Biopsy Date: 2020/02/06 | Treatment: ED&C | Treatment Date: 2020/04/03 | Status: COMPLETED\\nWell Differentiated Squamous Cell Carcinoma | Location: right distal pretibial region | Biopsy Date: 2019/05/02 | Treatment: Excision | Treatment Date: 2019/07/02 | Status: COMPLETED\\nBasal Cell Carcinoma, Nodular Type | Location: right central postauricular skin | Biopsy Date: 2017/11/02 | Treatment: Mohs | Treatment Date: 2017/11/15 | Status: COMPLETED\\nSquamous Cell Carcinoma in situ, cannot exclude underlying invasion | Location: left mid-upper back | Biopsy Date: 2016/12/08 | Treatment: EDC | Treatment Date: 2016/12/28 | Status: COMPLETED\\nBasal Cell Carcinoma | Location: right central postauricular skin | Biopsy Date: 2016/05/05 | Status: COMPLETED\\nWell Differentiated Squamous Cell Carcinoma | Location: right medial proximal pretibial region | Biopsy Date: 2016/05/05 | Status: COMPLETED\\ngranuloma annulare and BCC | Location: right superior lateral lower back | Biopsy Date: 2016/05/05 | Status: COMPLETED

## 2022-02-18 NOTE — PROCEDURE: COUNSELING
Quality 224: Stage 0-Iic Melanoma: Overutilization Of Imaging Studies For Only Stage 0-Iic Melanoma: None of the following diagnostic imaging studies ordered: chest X-ray, CT, Ultrasound, MRI, PET, or nuclear medicine scans (ML)
Detail Level: Zone
Detail Level: Generalized
Detail Level: Detailed
Quality 137: Melanoma: Continuity Of Care - Recall System: Patient information entered into a recall system that includes: target date for the next exam specified AND a process to follow up with patients regarding missed or unscheduled appointments
When Should The Patient Follow-Up For Their Next Full-Body Skin Exam?: 6 Months

## 2022-02-18 NOTE — PROCEDURE: LIQUID NITROGEN
Duration Of Freeze Thaw-Cycle (Seconds): 0
Render In Bullet Format When Appropriate: No
Detail Level: Zone
Render Post-Care Instructions In Note?: yes
Total Number Of Aks Treated: 15
Consent: The patient's consent was obtained including but not limited to risks of crusting, scabbing, blistering, scarring, darker or lighter pigmentary change, recurrence, incomplete removal and infection.
Post-Care Instructions: I reviewed with the patient in detail post-care instructions. Patient is to wear sunprotection, and avoid picking at any of the treated lesions. Pt may apply Vaseline to crusted or scabbing areas.

## 2022-02-19 NOTE — PROCEDURE: OTHER
No further recs. She is within the 6 weeks of PP, so she will have episodes of bleeding. It could be menses but with breastfeeding, may just be normal lochia.   I'd just observe and review bleeding precautions
Detail Level: Zone
Other (Free Text): F/U in 3 mos.
Note Text (......Xxx Chief Complaint.): This diagnosis correlates with the

## 2022-03-13 NOTE — OR NURSING
Patient allergies and NPO status verified, home medication reconciliation completed and belongings secured. Patient verbalizes understanding of pain scale, expected course of stay and plan of care. Surgical site verified with patient. IV access established; call light within reach. No further needs at this time; hourly rounding. Picture of Wound to right shin uploaded into epic.    Gastroenteritis

## 2022-06-10 ENCOUNTER — APPOINTMENT (OUTPATIENT)
Dept: SLEEP MEDICINE | Facility: MEDICAL CENTER | Age: 87
End: 2022-06-10
Payer: MEDICARE

## 2022-06-10 NOTE — PROGRESS NOTES
[facility] Follow-up Visit    Date of Visit: 6/10/2022     CC: ***  Follow-up for FLEX management      HPI:  Juanito Marquez is a very pleasant 86 y.o. year old male never smoker, with a PMHx of ***  who presented to the Pulmonary Clinic for a regular follow up. Last seen in the office on *** with ***.       Denies morning headaches.    Denies daytime drowsiness, napping, driving drowsy.     Denies any issues falling asleep.      Denies any symptoms of RLS, narcolepsy or any nightmares, sleep walking or acting out of dreams.    Goes to bed:  Wakes up:  Naps (frequency and duration):        DME provider: ***  Device: ***  When:***  Mask: ***  Aerophagia: {YES/NO:20266}  Snoring: {YES/NO:20266}  Dry mouth: {YES/NO:20266}  Leak: {YES/NO:20266}  Skin irritation: {YES/NO:20266}  Chin strap: {YES/NO:20266}    Cleaning regimen:    Compliance:  Compliance data reviewed showing ***% usage > 4hours in last 30 *** days. Average AHI *** events/hour. 90-95% pressure *** CWP. Patient continues to use and benefit from machine. ***     Sleep History:  ***    Patient Active Problem List    Diagnosis Date Noted   • CKD (chronic kidney disease) stage 4, GFR 15-29 ml/min ([facility])    • Type 2 diabetes mellitus, controlled ([facility]) 10/14/2015   • Abnormal thyroid exam 06/30/2014   • Keratosis, actinic 06/03/2014   • S/P CABG (coronary artery bypass graft) 06/27/2013   • Hypothyroidism 07/19/2012   • Renal insufficiency 08/08/2011   • Dyslipidemia    • Essential hypertension, benign    • Coronary artery disease due to lipid rich plaque    • Back pain 06/23/2009     Past Medical History:   Diagnosis Date   • Back pain 6/23/2009   • Breath shortness     with altitude over 7300 feet   • CAD (coronary artery disease)     CABG 1994. Cardiogy with [facility].   • Cataract     Bilateral phaco with IOL   • Chronic diarrhea 7/21/2008   • CKD (chronic kidney disease) stage 3, GFR 30-59 ml/min ([facility])    • CKD (chronic kidney disease) stage 4,  GFR 15-29 ml/min ([facility])    • Colon polyp 2007   • Disorder of thyroid    • DM (diabetes mellitus) ([facility])     6/24/19-Avg AM glucose=130-180, oral meds    • Hemorrhagic disorder ([facility])     bleeds easily due to ASA   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   • Sleep apnea     CPAP   • Snoring    • status post CABG 1994   • Status post cholecystectomy 2004      Past Surgical History:   Procedure Laterality Date   • INGUINAL HERNIA REPAIR Right 8/15/2019    Procedure: REPAIR, HERNIA, INGUINAL;  Surgeon: Ciro Ferguson M.D.;  Location: SURGERY [facility];  Service: General   • BASAL CELL EXCISION Right 7/2/2019    Procedure: EXCISION, CARCINOMA, BASAL CELL- FOR SQUAMOUS CELL DISTAL PRETIBIAL REGION;  Surgeon: Larry Tobin M.D.;  Location: SURGERY [facility];  Service: Plastics   • SPLIT THICKNESS SKIN GRAFT Right 7/2/2019    Procedure: APPLICATION, GRAFT, SKIN, SPLIT-THICKNESS;  Surgeon: Larry Tobin M.D.;  Location: SURGERY [facility];  Service: Plastics   • CATARACT PHACO WITH IOL Bilateral 2016   • COLONOSCOPY  2014   • FUSION, SPINE, LUMBAR, PLIF  2008    L2-L3, L4-L5   • MULTIPLE CORONARY ARTERY BYPASS  1994    6 vessel   • CERVICAL DISK AND FUSION ANTERIOR  1987   • VASECTOMY  1980    Reversal   • TONSILLECTOMY  1955   • COLECTOMY  early 2004    right FOR BENIGN ADENOMA with cholecystectomy     Family History   Problem Relation Age of Onset   • Cancer Mother         breast   • Heart Disease Mother    • Diabetes Father    • Cancer Father         bladder   • Other Father         ruptured appendix   • Diabetes Paternal Grandfather      Social History     Socioeconomic History   • Marital status:      Spouse name: Not on file   • Number of children: Not on file   • Years of education: Not on file   • Highest education  level: Not on file   Occupational History   • Not on file   Tobacco Use   • Smoking status: Never Smoker   • Smokeless tobacco: Never Used   Vaping Use   • Vaping Use: Never used   Substance and Sexual Activity   • Alcohol use: Yes     Alcohol/week: 2.4 oz     Types: 4 Glasses of wine per week     Comment: 1 per day   • Drug use: No   • Sexual activity: Not Currently   Other Topics Concern   • Not on file   Social History Narrative   • Not on file     Social Determinants of Health     Financial Resource Strain: Not on file   Food Insecurity: Not on file   Transportation Needs: Not on file   Physical Activity: Not on file   Stress: Not on file   Social Connections: Not on file   Intimate Partner Violence: Not on file   Housing Stability: Not on file     Current Outpatient Medications   Medication Sig Dispense Refill   • cyanocobalamin (VITAMIN B12) 500 MCG tablet Take 500 mcg by mouth every day.     • ascorbic acid (VITAMIN C) 500 MG tablet Take 1,000 mg by mouth every day.     • losartan (COZAAR) 25 MG Tab Take 1 Tablet by mouth every day. 90 Tablet 3   • rosuvastatin (CRESTOR) 10 MG Tab Take 1 Tablet by mouth every evening. 90 Tablet 3   • glyBURIDE (DIABETA) 1.25 MG tablet Take 1.25 mg by mouth.     • ferrous sulfate 325 (65 Fe) MG EC tablet Take 1 Tab by mouth 2 times a day. 180 Tab 3   • glimepiride (AMARYL) 1 MG tablet Take 1 mg by mouth 2 times a day.     • triamcinolone (NASACORT ALLERGY 24HR) 55 MCG/ACT nasal inhaler Spray 2 Sprays in nose every day.     • Coenzyme Q10 (CO Q10) 100 MG Cap Take 2 Caps by mouth 2 Times a Day.     • Empagliflozin 10 MG Tab Take 1 tablet by mouth every morning with breakfast. 90 Tab 3   • Exenatide ER (BYDUREON) 2 MG Pen-injector Inject 2 mg as instructed every 7 days. 12 Each 3   • levothyroxine (SYNTHROID) 100 MCG Tab Take 1 Tab by mouth Every morning on an empty stomach. 90 Tab 3   • niacinamide 500 MG tablet Take 500 mg by mouth 2 Times a Day.     • Ascorbic Acid (VITAMIN C)  1000 MG Tab Take 1,000 mg by mouth every day.     • Omega-3 Fatty Acids (FISH OIL) 1200 MG CAPS Take 2 Caps by mouth every day.     • Cholecalciferol (VITAMIN D) 2000 UNIT TABS Take 2 Tabs by mouth every day.     • aspirin 81 MG tablet Take 81 mg by mouth every day.       No current facility-administered medications for this visit.      ALLERGIES: Patient has no known allergies.    ROS:  Constitutional: Denies fever, chills, sweats,  weight loss, fatigue  Cardiovascular: Denies chest pain, tightness, palpitations, swelling in legs/feet  Respiratory: Denies shortness of breath, cough, sputum, wheezing, painful breathing   Sleep: per HPI  Gastrointestinal: Denies  difficulty swallowing, nausea, abdominal pain, diarrhea, constipation, heartburn.  Musculoskeletal: Denies painful joints, sore muscles,       PHYSICAL EXAM:  There were no vitals taken for this visit.  Appearance: Well-nourished, well-developed, no acute distress  Eyes:  No scleral icterus , EOMI  ENMT: No redness of the oropharynx***  Lung auscultation:  No wheezes rhonchi rubs or rales***  Cardiac: No murmurs, rubs, or gallops; regular rhythm, normal rate; no edema***  Musculoskeletal:  Grossly normal; gait and station normal; digits and nails normal  Skin:  No rashes, petechiae, cyanosis  Neurologic: without focal signs; oriented to person, time, place, and purpose; judgement intact  Psychiatric:  No depression, anxiety, agitation  Mallampati score: Class ***    Assessment and Plan:    The medical record was reviewed.    Diagnostic and titration nocturnal polysomnogram's, home sleep apnea tests, continuous nocturnal oximetry results, multiple sleep latency tests, and compliance reports reviewed.    Problem List Items Addressed This Visit    None         PLAN: ****    1. Sleep Apnea:    The pathophysiology of sleep anea and the increased risk of cardiovascular morbidity from untreated sleep apnea is discussed in detail with the patient.  She is urged to  avoid supine sleep, weight gain and alcoholic beverages since all of these can worsen sleep apnea. She is cautioned against drowsy driving. If She feels sleepy while driving, She must pull over for a break/nap, rather than persist on the road, in the interest of She own safety and that of others on the road.      Compliance download was reviewed and discussed with the patient.     - Order placed for mask and supplies   - Compliance was reinforced  - Advised patient to reach out via [employer] if any questions or concerns should arise.   - Equipment replacement schedule:  Mask cushion every month  Nasal pillows 2 times per month  Mask every 6 months  Head gear every 6 months  Tubing every 3 months  Ultra-fine filters 2 times per month  Foam filter every 6 months  Humidifier chamber every 6 months  Chin strap every 6 months    Has been advised to continue the current ***, clean equipment frequently, and get new mask and supplies as allowed by insurance and DME. Recommend an earlier appointment, if significant treatment barriers develop.    The risks of untreated sleep apnea were discussed with the patient at length. Patients with FLEX are at increased risk of cardiovascular disease including coronary artery disease, systemic arterial hypertension, pulmonary arterial hypertension, cardiac arrythmias, and stroke. The patient was advised to avoid driving a motor vehicle when drowsy.    Positive airway pressure will favorably impact many of the adverse conditions and effects provoked by FLEX.    Have advised the patient to follow up with the appropriate healthcare practitioners for all other medical problems and issues.    No follow-ups on file.      Please note portions of this record was created using voice recognition software. I have made every reasonable attempt to correct obvious errors, but I expect that there are errors of grammar and possibly content I did not discover before finalizing the note.    Time spent in  record review prior to patient arrival, reviewing results, and in face-to-face encounter totaled *** min.  __________  MARISSA Suresh  Pulmonary & Sleep Medicine  [facility]

## 2022-06-22 NOTE — PROGRESS NOTES
[facility] Follow-up Visit    Date of Visit: 6/28/2022     CC: Follow-up for FLEX management      HPI:  Juanito Marquez is a very pleasant 86 y.o. year old male never smoker, with a PMHx of FLEX, CKD, CAD s/p CABG, DSL, HTN, hypothyroid, and T2DM who presented to the Sleep Clinic for a regular follow up. Last seen in the office on 7/30/2020 with MARISSA Wei.     Patient presents for his yearly follow-up.  He states that he will be moving to [place], [place] in a couple months.  He states he was unaware about the [employer] recall.  He denies any morning headaches, daytime/driving drowsy, issues falling asleep, snoring, or gasping for air while on the machine.  He does feel that his sleep is restful.  He is currently going to bed at 9 PM and waking up at 6 AM with only 1 awakening at night without any issues falling asleep.  He currently naps 1 time a day for half an hour.    At his last office visit his AHI was 6.1, so his CPAP was adjusted to an auto CPAP 7-11 CWP.  AHI on the last 30-day compliance is 6.3 with a peak average pressure of 11 CWP and a mean pressure of 8.1 CWP.  His average usage of more than 4 hours a night is 66.7%, which she states is low due to him traveling and not using the CPAP during his travels.  But he does state that he uses his machine every night when he is not traveling.    DME provider:  Online company, as his DME company has been out of business for the last 5 years.  Device:  [employer] DreamStation auto CPAP  When: 2016  Mask:  Nasal pillows  Aerophagia: No   Snoring: No   Dry mouth: No   Leak: No   Skin irritation: No   Chin strap: No     Cleaning regimen: He washes his supplies with vinegar.    Compliance:  Compliance data reviewed showing 66.7% usage > 4hours in last 30 days. Average AHI 6.3 events/hour. 90-95% pressure 11.0 CWP. Patient continues to use and benefit from machine.      Sleep History:  Prior PSG not available but diagnosed >10yrs ago and  treated with CPAP 7cm. Prior CNOX 2018 on pap noted basal spo2 91% and <88% for 25min of night.     Patient Active Problem List    Diagnosis Date Noted   • FLEX on CPAP 06/28/2022   • CKD (chronic kidney disease) stage 4, GFR 15-29 ml/min ([facility])    • Type 2 diabetes mellitus, controlled ([facility]) 10/14/2015   • Abnormal thyroid exam 06/30/2014   • Keratosis, actinic 06/03/2014   • S/P CABG (coronary artery bypass graft) 06/27/2013   • Hypothyroidism 07/19/2012   • Renal insufficiency 08/08/2011   • Dyslipidemia    • Essential hypertension, benign    • Coronary artery disease due to lipid rich plaque    • Back pain 06/23/2009     Past Medical History:   Diagnosis Date   • Back pain 6/23/2009   • Breath shortness     with altitude over 7300 feet   • CAD (coronary artery disease)     CABG 1994. Cardiogy with [facility].   • Cataract     Bilateral phaco with IOL   • Chronic diarrhea 7/21/2008   • CKD (chronic kidney disease) stage 3, GFR 30-59 ml/min ([facility])    • CKD (chronic kidney disease) stage 4, GFR 15-29 ml/min ([facility])    • Colon polyp 2007   • Disorder of thyroid    • DM (diabetes mellitus) ([facility])     6/24/19-Avg AM glucose=130-180, oral meds    • Hemorrhagic disorder ([facility])     bleeds easily due to ASA   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   • Sleep apnea     CPAP   • Snoring    • status post CABG 1994   • Status post cholecystectomy 2004      Past Surgical History:   Procedure Laterality Date   • INGUINAL HERNIA REPAIR Right 8/15/2019    Procedure: REPAIR, HERNIA, INGUINAL;  Surgeon: Ciro Ferguson M.D.;  Location: SURGERY [facility];  Service: General   • BASAL CELL EXCISION Right 7/2/2019    Procedure: EXCISION, CARCINOMA, BASAL CELL- FOR SQUAMOUS CELL DISTAL PRETIBIAL REGION;  Surgeon: Larry Tobin M.D.;  Location: SURGERY [facility];   Service: Plastics   • SPLIT THICKNESS SKIN GRAFT Right 7/2/2019    Procedure: APPLICATION, GRAFT, SKIN, SPLIT-THICKNESS;  Surgeon: Larry Tobin M.D.;  Location: SURGERY [facility];  Service: Plastics   • CATARACT PHACO WITH IOL Bilateral 2016   • COLONOSCOPY  2014   • FUSION, SPINE, LUMBAR, PLIF  2008    L2-L3, L4-L5   • MULTIPLE CORONARY ARTERY BYPASS  1994    6 vessel   • CERVICAL DISK AND FUSION ANTERIOR  1987   • VASECTOMY  1980    Reversal   • TONSILLECTOMY  1955   • COLECTOMY  early 2004    right FOR BENIGN ADENOMA with cholecystectomy     Family History   Problem Relation Age of Onset   • Cancer Mother         breast   • Heart Disease Mother    • Diabetes Father    • Cancer Father         bladder   • Other Father         ruptured appendix   • Diabetes Paternal Grandfather      Social History     Socioeconomic History   • Marital status:      Spouse name: Not on file   • Number of children: Not on file   • Years of education: Not on file   • Highest education level: Not on file   Occupational History   • Not on file   Tobacco Use   • Smoking status: Never Smoker   • Smokeless tobacco: Never Used   Vaping Use   • Vaping Use: Never used   Substance and Sexual Activity   • Alcohol use: Yes     Alcohol/week: 2.4 oz     Types: 4 Glasses of wine per week     Comment: 1 per day   • Drug use: No   • Sexual activity: Not Currently   Other Topics Concern   • Not on file   Social History Narrative   • Not on file     Social Determinants of Health     Financial Resource Strain: Not on file   Food Insecurity: Not on file   Transportation Needs: Not on file   Physical Activity: Not on file   Stress: Not on file   Social Connections: Not on file   Intimate Partner Violence: Not on file   Housing Stability: Not on file     Current Outpatient Medications   Medication Sig Dispense Refill   • cyanocobalamin (VITAMIN B12) 500 MCG tablet Take 500 mcg by mouth every day.     • ascorbic acid (VITAMIN C) 500 MG  tablet Take 1,000 mg by mouth every day.     • losartan (COZAAR) 25 MG Tab Take 1 Tablet by mouth every day. 90 Tablet 3   • rosuvastatin (CRESTOR) 10 MG Tab Take 1 Tablet by mouth every evening. 90 Tablet 3   • glyBURIDE (DIABETA) 1.25 MG tablet Take 1.25 mg by mouth.     • ferrous sulfate 325 (65 Fe) MG EC tablet Take 1 Tab by mouth 2 times a day. 180 Tab 3   • glimepiride (AMARYL) 1 MG tablet Take 1 mg by mouth 2 times a day.     • triamcinolone (NASACORT ALLERGY 24HR) 55 MCG/ACT nasal inhaler Spray 2 Sprays in nose every day.     • Coenzyme Q10 (CO Q10) 100 MG Cap Take 2 Caps by mouth 2 Times a Day.     • Empagliflozin 10 MG Tab Take 1 tablet by mouth every morning with breakfast. 90 Tab 3   • Exenatide ER (BYDUREON) 2 MG Pen-injector Inject 2 mg as instructed every 7 days. 12 Each 3   • levothyroxine (SYNTHROID) 100 MCG Tab Take 1 Tab by mouth Every morning on an empty stomach. 90 Tab 3   • niacinamide 500 MG tablet Take 500 mg by mouth 2 Times a Day.     • Ascorbic Acid (VITAMIN C) 1000 MG Tab Take 1,000 mg by mouth every day.     • Omega-3 Fatty Acids (FISH OIL) 1200 MG CAPS Take 2 Caps by mouth every day.     • Cholecalciferol (VITAMIN D) 2000 UNIT TABS Take 2 Tabs by mouth every day.     • aspirin 81 MG tablet Take 81 mg by mouth every day.       No current facility-administered medications for this visit.      ALLERGIES: Patient has no known allergies.    ROS:  Constitutional: Denies fever, chills, sweats,  weight loss, fatigue  Cardiovascular: Denies chest pain, tightness, palpitations, swelling in legs/feet  Respiratory: Denies shortness of breath, cough, sputum, wheezing, painful breathing   Sleep: per HPI  Gastrointestinal: Denies  difficulty swallowing, nausea, abdominal pain, diarrhea, constipation, heartburn.  Musculoskeletal: Denies painful joints, sore muscles,       PHYSICAL EXAM:  /54 (BP Location: Right arm, Patient Position: Sitting, BP Cuff Size: Adult)   Pulse 89   Ht 1.829 m (6')    Wt 66.2 kg (146 lb)   SpO2 97%   BMI 19.80 kg/m²   Appearance: Well-nourished, well-developed, no acute distress  Eyes:  No scleral icterus , EOMI  ENMT: No redness of the oropharynx  Musculoskeletal:  Grossly normal; gait and station normal; digits and nails normal  Skin:  No rashes, petechiae, cyanosis  Neurologic: without focal signs; oriented to person, time, place, and purpose; judgement intact  Psychiatric:  No depression, anxiety, agitation    Assessment and Plan:    The medical record was reviewed.    Diagnostic and titration nocturnal polysomnogram's, home sleep apnea tests, continuous nocturnal oximetry results, multiple sleep latency tests, and compliance reports reviewed.    Problem List Items Addressed This Visit     FLEX on CPAP     Sleep Apnea:    The pathophysiology of sleep anea and the increased risk of cardiovascular morbidity from untreated sleep apnea is discussed in detail with the patient.  She is urged to avoid supine sleep, weight gain and alcoholic beverages since all of these can worsen sleep apnea. She is cautioned against drowsy driving. If She feels sleepy while driving, She must pull over for a break/nap, rather than persist on the road, in the interest of She own safety and that of others on the road.      Compliance download was reviewed and discussed with the patient.     - Order placed for new CPAP machine as his machine is older than 5 years and he is also part of the [employer] recall.  -30-day compliance shows AHI of 6.3 with a peak average pressure of 11 CWP.  Current auto settings are 7-11 CWP.  We will increase CPAP pressures to 7-15 CWP.  We will see him back in 4 weeks to see if there is any effect on AHI.  -Patient is also moving to [place], [place] in a couple of months.  Due to this, we will provide him a handwritten prescription for a new CPAP machine so that he can get established with a DME company and sleep provider in [place].    - Compliance was reinforced  -  Advised patient to reach out via [employer] if any questions or concerns should arise.   - Equipment replacement schedule:  Mask cushion every month  Nasal pillows 2 times per month  Mask every 6 months  Head gear every 6 months  Tubing every 3 months  Ultra-fine filters 2 times per month  Foam filter every 6 months  Humidifier chamber every 6 months  Chin strap every 6 months    Has been advised to continue the current  auto CPAP 7-15 CWP, clean equipment frequently, and get new mask and supplies as allowed by insurance and DME. Recommend an earlier appointment, if significant treatment barriers develop.    The risks of untreated sleep apnea were discussed with the patient at length. Patients with FLEX are at increased risk of cardiovascular disease including coronary artery disease, systemic arterial hypertension, pulmonary arterial hypertension, cardiac arrythmias, and stroke. The patient was advised to avoid driving a motor vehicle when drowsy.    Positive airway pressure will favorably impact many of the adverse conditions and effects provoked by FLEX.             Relevant Orders    DME CPAP        Have advised the patient to follow up with the appropriate healthcare practitioners for all other medical problems and issues.    Return in about 4 weeks (around 7/26/2022), or if symptoms worsen or fail to improve.      Please note portions of this record was created using voice recognition software. I have made every reasonable attempt to correct obvious errors, but I expect that there are errors of grammar and possibly content I did not discover before finalizing the note.    Time spent in record review prior to patient arrival, reviewing results, and in face-to-face encounter totaled 30 min.  __________  MARISSA Suresh  Pulmonary & Sleep Medicine  [facility]

## 2022-06-28 ENCOUNTER — OFFICE VISIT (OUTPATIENT)
Dept: SLEEP MEDICINE | Facility: MEDICAL CENTER | Age: 87
End: 2022-06-28
Payer: MEDICARE

## 2022-06-28 VITALS
SYSTOLIC BLOOD PRESSURE: 116 MMHG | WEIGHT: 146 LBS | OXYGEN SATURATION: 97 % | DIASTOLIC BLOOD PRESSURE: 54 MMHG | HEART RATE: 89 BPM | HEIGHT: 72 IN | BODY MASS INDEX: 19.77 KG/M2

## 2022-06-28 DIAGNOSIS — G47.33 OSA ON CPAP: ICD-10-CM

## 2022-06-28 PROCEDURE — 99214 OFFICE O/P EST MOD 30 MIN: CPT

## 2022-06-28 NOTE — ASSESSMENT & PLAN NOTE
Sleep Apnea:    The pathophysiology of sleep anea and the increased risk of cardiovascular morbidity from untreated sleep apnea is discussed in detail with the patient.  She is urged to avoid supine sleep, weight gain and alcoholic beverages since all of these can worsen sleep apnea. She is cautioned against drowsy driving. If She feels sleepy while driving, She must pull over for a break/nap, rather than persist on the road, in the interest of She own safety and that of others on the road.      Compliance download was reviewed and discussed with the patient.     - Order placed for new CPAP machine as his machine is older than 5 years and he is also part of the [employer] recall.  -30-day compliance shows AHI of 6.3 with a peak average pressure of 11 CWP.  Current auto settings are 7-11 CWP.  We will increase CPAP pressures to 7-15 CWP.  We will see him back in 4 weeks to see if there is any effect on AHI.  -Patient is also moving to [place], [place] in a couple of months.  Due to this, we will provide him a handwritten prescription for a new CPAP machine so that he can get established with a DME company and sleep provider in [place].    - Compliance was reinforced  - Advised patient to reach out via [employer] if any questions or concerns should arise.   - Equipment replacement schedule:  Mask cushion every month  Nasal pillows 2 times per month  Mask every 6 months  Head gear every 6 months  Tubing every 3 months  Ultra-fine filters 2 times per month  Foam filter every 6 months  Humidifier chamber every 6 months  Chin strap every 6 months    Has been advised to continue the current  auto CPAP 7-15 CWP, clean equipment frequently, and get new mask and supplies as allowed by insurance and DME. Recommend an earlier appointment, if significant treatment barriers develop.    The risks of untreated sleep apnea were discussed with the patient at length. Patients with FLEX are at increased risk of cardiovascular disease  including coronary artery disease, systemic arterial hypertension, pulmonary arterial hypertension, cardiac arrythmias, and stroke. The patient was advised to avoid driving a motor vehicle when drowsy.    Positive airway pressure will favorably impact many of the adverse conditions and effects provoked by FLEX.

## 2022-08-12 ENCOUNTER — TELEPHONE (OUTPATIENT)
Dept: CARDIOLOGY | Facility: MEDICAL CENTER | Age: 87
End: 2022-08-12
Payer: MEDICARE

## 2022-08-12 NOTE — TELEPHONE ENCOUNTER
Returned pt call and reviewed concerns.  Offered 9/28/22 at 1345, he accepts.  He states he will call if he is unable to make the appointment.  He verbalizes understanding and states no other concerns or questions at this time.  Juanito is appreciative of information given.

## 2022-08-12 NOTE — TELEPHONE ENCOUNTER
KELLY    Caller: Juanito Marquez    Topic/issue: MOVING    Patient states that he will be moving away and would like to know if he can be seen ASAP with [facility]. Please advise.    Thank you,  Aj STRONG    Callback Number: [phone] (home) [phone] (work)

## 2022-09-13 ENCOUNTER — APPOINTMENT (RX ONLY)
Dept: URBAN - METROPOLITAN AREA CLINIC 38 | Facility: CLINIC | Age: 87
Setting detail: DERMATOLOGY
End: 2022-09-13

## 2022-09-13 DIAGNOSIS — L57.0 ACTINIC KERATOSIS: ICD-10-CM

## 2022-09-13 DIAGNOSIS — Z86.006 PERSONAL HISTORY OF MELANOMA IN-SITU: ICD-10-CM

## 2022-09-13 DIAGNOSIS — Z85.828 PERSONAL HISTORY OF OTHER MALIGNANT NEOPLASM OF SKIN: ICD-10-CM

## 2022-09-13 PROBLEM — D48.5 NEOPLASM OF UNCERTAIN BEHAVIOR OF SKIN: Status: ACTIVE | Noted: 2022-09-13

## 2022-09-13 PROCEDURE — 11103 TANGNTL BX SKIN EA SEP/ADDL: CPT

## 2022-09-13 PROCEDURE — ? LIQUID NITROGEN

## 2022-09-13 PROCEDURE — 11102 TANGNTL BX SKIN SINGLE LES: CPT

## 2022-09-13 PROCEDURE — 17000 DESTRUCT PREMALG LESION: CPT | Mod: 59

## 2022-09-13 PROCEDURE — ? COUNSELING

## 2022-09-13 PROCEDURE — ? BIOPSY BY SHAVE METHOD

## 2022-09-13 PROCEDURE — 99202 OFFICE O/P NEW SF 15 MIN: CPT | Mod: 25

## 2022-09-13 ASSESSMENT — LOCATION DETAILED DESCRIPTION DERM
LOCATION DETAILED: RIGHT DISTAL PRETIBIAL REGION
LOCATION DETAILED: LEFT LATERAL UPPER BACK
LOCATION DETAILED: RIGHT SUPERIOR FOREHEAD

## 2022-09-13 ASSESSMENT — LOCATION SIMPLE DESCRIPTION DERM
LOCATION SIMPLE: RIGHT PRETIBIAL REGION
LOCATION SIMPLE: RIGHT FOREHEAD
LOCATION SIMPLE: LEFT UPPER BACK

## 2022-09-13 ASSESSMENT — LOCATION ZONE DERM
LOCATION ZONE: FACE
LOCATION ZONE: LEG
LOCATION ZONE: TRUNK

## 2022-09-13 NOTE — HPI: EVALUATION OF SKIN LESION(S)
What Type Of Note Output Would You Prefer (Optional)?: Standard Output
How Severe Are Your Spot(S)?: moderate
Have Your Spot(S) Been Treated In The Past?: has not been treated
Hpi Title: Evaluation of Skin Lesions
Location: Forehead
Year Removed: 2012

## 2022-09-13 NOTE — PROCEDURE: BIOPSY BY SHAVE METHOD
Detail Level: Detailed
Depth Of Biopsy: dermis
Was A Bandage Applied: Yes
Size Of Lesion In Cm: 0
Biopsy Type: H and E
Biopsy Method: Riddhi kincaid
Anesthesia Type: 1% lidocaine with epinephrine
Anesthesia Volume In Cc: 0.5
Hemostasis: Drysol
Wound Care: Petrolatum
Dressing: bandage
Destruction After The Procedure: No
Type Of Destruction Used: Curettage
Curettage Text: The wound bed was treated with curettage after the biopsy was performed.
Cryotherapy Text: The wound bed was treated with cryotherapy after the biopsy was performed.
Electrodesiccation Text: The wound bed was treated with electrodesiccation after the biopsy was performed.
Electrodesiccation And Curettage Text: The wound bed was treated with electrodesiccation and curettage after the biopsy was performed.
Silver Nitrate Text: The wound bed was treated with silver nitrate after the biopsy was performed.
Lab: [id number]
Lab Facility: 
Consent: Written consent was obtained and risks were reviewed including but not limited to scarring, infection, bleeding, scabbing, incomplete removal, nerve damage and allergy to anesthesia.
Post-Care Instructions: I reviewed with the patient in detail post-care instructions. Patient is to keep the biopsy site dry overnight, and then apply bacitracin twice daily until healed. Patient may apply hydrogen peroxide soaks to remove any crusting.
Notification Instructions: Patient will be notified of biopsy results. However, patient instructed to call the office if not contacted within 2 weeks.
Billing Type: Third-Party Bill
Information: Selecting Yes will display possible errors in your note based on the variables you have selected. This validation is only offered as a suggestion for you. PLEASE NOTE THAT THE VALIDATION TEXT WILL BE REMOVED WHEN YOU FINALIZE YOUR NOTE. IF YOU WANT TO FAX A PRELIMINARY NOTE YOU WILL NEED TO TOGGLE THIS TO 'NO' IF YOU DO NOT WANT IT IN YOUR FAXED NOTE.

## 2022-09-28 ENCOUNTER — OFFICE VISIT (OUTPATIENT)
Dept: CARDIOLOGY | Facility: MEDICAL CENTER | Age: 87
End: 2022-09-28
Payer: MEDICARE

## 2022-09-28 VITALS
DIASTOLIC BLOOD PRESSURE: 56 MMHG | HEART RATE: 83 BPM | BODY MASS INDEX: 20.32 KG/M2 | WEIGHT: 150 LBS | HEIGHT: 72 IN | OXYGEN SATURATION: 97 % | RESPIRATION RATE: 12 BRPM | SYSTOLIC BLOOD PRESSURE: 116 MMHG

## 2022-09-28 DIAGNOSIS — E11.22 CKD STAGE 4 DUE TO TYPE 2 DIABETES MELLITUS (HCC): Chronic | ICD-10-CM

## 2022-09-28 DIAGNOSIS — N18.4 CKD STAGE 4 DUE TO TYPE 2 DIABETES MELLITUS (HCC): Chronic | ICD-10-CM

## 2022-09-28 DIAGNOSIS — E78.5 DYSLIPIDEMIA: ICD-10-CM

## 2022-09-28 DIAGNOSIS — I25.10 CORONARY ARTERY DISEASE DUE TO LIPID RICH PLAQUE: ICD-10-CM

## 2022-09-28 DIAGNOSIS — E11.59 TYPE 2 DIABETES MELLITUS WITH OTHER CIRCULATORY COMPLICATION, WITHOUT LONG-TERM CURRENT USE OF INSULIN (HCC): Chronic | ICD-10-CM

## 2022-09-28 DIAGNOSIS — I25.9 ISCHEMIC HEART DISEASE DUE TO CORONARY ARTERY OBSTRUCTION (HCC): ICD-10-CM

## 2022-09-28 DIAGNOSIS — Z95.1 S/P CABG (CORONARY ARTERY BYPASS GRAFT): ICD-10-CM

## 2022-09-28 DIAGNOSIS — I25.83 CORONARY ARTERY DISEASE DUE TO LIPID RICH PLAQUE: ICD-10-CM

## 2022-09-28 DIAGNOSIS — I24.0 ISCHEMIC HEART DISEASE DUE TO CORONARY ARTERY OBSTRUCTION (HCC): ICD-10-CM

## 2022-09-28 DIAGNOSIS — I70.0 ATHEROSCLEROSIS OF AORTA (HCC): ICD-10-CM

## 2022-09-28 PROCEDURE — 99214 OFFICE O/P EST MOD 30 MIN: CPT | Performed by: INTERNAL MEDICINE

## 2022-09-28 NOTE — PROGRESS NOTES
Chief Complaint   Patient presents with    Hypertension    Coronary Artery Disease     F/V Dx:   Coronary artery disease due to lipid rich plaque             Subjective     Juanito Marquez is a 87 y.o. male who presents today for follow-up of his history of CABG with diabetes aortic atherosclerosis    Is done well over the past year tolerating his medications well follows up with endocrine and primary care    He is going through divorce moving full time to [place]    GFR was briefly 24 improved to 30    Past Medical History:   Diagnosis Date    Back pain 6/23/2009    Breath shortness     with altitude over 7300 feet    CAD (coronary artery disease)     CABG 1994. Cardiogy with [facility].    Cataract     Bilateral phaco with IOL    Chronic diarrhea 7/21/2008    CKD (chronic kidney disease) stage 3, GFR 30-59 ml/min (HCC)     CKD (chronic kidney disease) stage 4, GFR 15-29 ml/min (HCC)     Colon polyp 2007    Disorder of thyroid     DM (diabetes mellitus) (HCC)     6/24/19-Avg AM glucose=130-180, oral meds     Hemorrhagic disorder ([facility])     bleeds easily due to ASA    High cholesterol     Hyperlipidemia     Hypertension     Keratosis, actinic 6/3/2014    lumbar laminectomy 2010    Overweight(278.02) 7/21/2008    S/P right colectomy 2004    benign tubulovillous adenoma    Sleep apnea     CPAP    Snoring     status post CABG 1994    Status post cholecystectomy 2004     Past Surgical History:   Procedure Laterality Date    INGUINAL HERNIA REPAIR Right 8/15/2019    Procedure: REPAIR, HERNIA, INGUINAL;  Surgeon: Ciro Ferguson M.D.;  Location: SURGERY [facility];  Service: General    BASAL CELL EXCISION Right 7/2/2019    Procedure: EXCISION, CARCINOMA, BASAL CELL- FOR SQUAMOUS CELL DISTAL PRETIBIAL REGION;  Surgeon: Larry Tobin M.D.;  Location: SURGERY [facility];  Service: Plastics    SPLIT THICKNESS SKIN GRAFT Right 7/2/2019    Procedure: APPLICATION, GRAFT, SKIN, SPLIT-THICKNESS;  Surgeon: Larry ROMERO  RAYNE Tobin;  Location: SURGERY [facility];  Service: Plastics    CATARACT PHACO WITH IOL Bilateral 2016    COLONOSCOPY  2014    FUSION, SPINE, LUMBAR, PLIF  2008    L2-L3, L4-L5    MULTIPLE CORONARY ARTERY BYPASS  1994    6 vessel    CERVICAL DISK AND FUSION ANTERIOR  1987    VASECTOMY  1980    Reversal    TONSILLECTOMY  1955    COLECTOMY  early 2004    right FOR BENIGN ADENOMA with cholecystectomy     Family History   Problem Relation Age of Onset    Cancer Mother         breast    Heart Disease Mother     Diabetes Father     Cancer Father         bladder    Other Father         ruptured appendix    Diabetes Paternal Grandfather      Social History     Socioeconomic History    Marital status:      Spouse name: Not on file    Number of children: Not on file    Years of education: Not on file    Highest education level: Not on file   Occupational History    Not on file   Tobacco Use    Smoking status: Never    Smokeless tobacco: Never   Vaping Use    Vaping Use: Never used   Substance and Sexual Activity    Alcohol use: Yes     Alcohol/week: 2.4 oz     Types: 4 Glasses of wine per week     Comment: 1 per day    Drug use: No    Sexual activity: Not Currently   Other Topics Concern    Not on file   Social History Narrative    Not on file     Social Determinants of Health     Financial Resource Strain: Not on file   Food Insecurity: Not on file   Transportation Needs: Not on file   Physical Activity: Not on file   Stress: Not on file   Social Connections: Not on file   Intimate Partner Violence: Not on file   Housing Stability: Not on file     No Known Allergies  Outpatient Encounter Medications as of 9/28/2022   Medication Sig Dispense Refill    losartan (COZAAR) 25 MG Tab Take 1 Tablet by mouth every day. 90 Tablet 3    rosuvastatin (CRESTOR) 10 MG Tab Take 1 Tablet by mouth every evening. 90 Tablet 3    glyBURIDE (DIABETA) 1.25 MG tablet Take 1.25 mg by mouth.      glimepiride (AMARYL) 1 MG tablet  Take 1 mg by mouth 2 times a day.      triamcinolone (NASACORT) 55 MCG/ACT nasal inhaler Spray 2 Sprays in nose every day.      Coenzyme Q10 (CO Q10) 100 MG Cap Take 2 Caps by mouth 2 Times a Day.      Empagliflozin 10 MG Tab Take 1 tablet by mouth every morning with breakfast. 90 Tab 3    Exenatide ER (BYDUREON) 2 MG Pen-injector Inject 2 mg as instructed every 7 days. 12 Each 3    levothyroxine (SYNTHROID) 100 MCG Tab Take 1 Tab by mouth Every morning on an empty stomach. 90 Tab 3    niacinamide 500 MG tablet Take 500 mg by mouth 2 Times a Day.      Ascorbic Acid (VITAMIN C) 1000 MG Tab Take 1,000 mg by mouth every day.      Omega-3 Fatty Acids 1200 MG Cap Take 2 Caps by mouth every day.      Cholecalciferol (VITAMIN D) 2000 UNIT TABS Take 2 Tabs by mouth every day.      aspirin 81 MG tablet Take 81 mg by mouth every day.      [DISCONTINUED] cyanocobalamin (VITAMIN B12) 500 MCG tablet Take 500 mcg by mouth every day. (Patient not taking: Reported on 9/28/2022)      [DISCONTINUED] ascorbic acid (VITAMIN C) 500 MG tablet Take 1,000 mg by mouth every day. (Patient not taking: Reported on 9/28/2022)      ferrous sulfate 325 (65 Fe) MG EC tablet Take 1 Tab by mouth 2 times a day. (Patient not taking: Reported on 9/28/2022) 180 Tab 3     No facility-administered encounter medications on file as of 9/28/2022.     ROS           Objective     /56 (BP Location: Left arm, Patient Position: Sitting, BP Cuff Size: Adult)   Pulse 83   Resp 12   Ht 1.829 m (6')   Wt 68 kg (150 lb)   SpO2 97%   BMI 20.34 kg/m²     Physical Exam  Constitutional:       General: He is not in acute distress.     Appearance: He is not diaphoretic.   HENT:      Mouth/Throat:      Comments: Wearing a mask for COVID protocol  Eyes:      General: No scleral icterus.  Neck:      Vascular: No JVD.   Cardiovascular:      Rate and Rhythm: Normal rate.      Heart sounds: Normal heart sounds. No murmur heard.    No friction rub. No gallop.    Pulmonary:      Effort: No respiratory distress.      Breath sounds: No wheezing or rales.   Abdominal:      General: Bowel sounds are normal.      Palpations: Abdomen is soft.   Musculoskeletal:      Right lower leg: No edema.      Left lower leg: No edema.   Skin:     Findings: No rash.   Neurological:      Mental Status: He is alert. Mental status is at baseline.   Psychiatric:         Mood and Affect: Mood normal.        REFERRING PHYSICIAN:  Uilces Mcintyre MD     AGE:  83    GENDER:  Male     HEIGHT:  72 inches     WEIGHT:  160 pounds       BMI:       INDICATIONS:  Chest pain, coronary artery disease, history of coronary artery   bypass graft surgery.     MEDICATIONS:       PROCEDURE:  The patient reviewed and signed the acknowledgement for testing   form.  The patient was in a fasting state and was properly prepared for   testing.  An intravenous line was inserted and a flush of normal saline   followed to insure line patency.     A transmission scan was acquired for attenuation correction using the internal   Germanium sources.  The patient was then administered 20.0 mCi of   Rubidium-82.  Approximately 90 seconds after the infusion, resting imagine   were obtained with ECG-gating.  Following the resting series, the patient   administered 41.5 mg of dipyridamole over four minutes.  The blood pressure,   heart rate and ECG were monitored and recorded.  After the dipyridamole   infusion was completed, another transmission scan for attenuation correction   was obtained.  The patient was then administered 20.0 mCi of Rubidium-82.    Approximately 90 seconds after the infusion, Peak stress images were obtained   with ECG-gating.     CLINICAL RESPONSE:  Resting blood pressure was 90/50 mmHg with a heart rate of   81 beats per minute.  Immediately post-dipyridamole infusion the blood   pressure was 86/46 mmHg with a heart rate of 94 beats per minute.  After a   recovery period the blood pressure was 87/45  "mmHg with a heart rate of 94   beats per minute.     The patient experienced headache, dizziness during testing.     Aminophylline 0 mg was administered following the scan.     ELECTROCARDIOGRAPHIC FINDINGS:  Rest EKG shows sinus rhythm with nonspecific   intraventricular conduction delay.  Stress EKG shows no significant ST segment   changes.     SCINTOGRAPHIC FINDINGS:  Rest images shows small attenuation of the distal   anterior wall and the apex, which is enhanced on stress images consistent with   a small infarct with ischemia.     GATED WALL MOTION FINDINGS:  Resting ejection fraction 54%.  Stress ejection   fraction 76%.  No segmental wall motion abnormalities.     CONCLUSIONS AND IMPRESSIONS:  1.  Abnormal cardiac PET scan.        Small distal anterior, apical infarct with small liset-infarct ischemia.  2.  Normal left ventricular systolic function with resting ejection fraction 54%, stress ejection fraction of 76%.         No segmental wall motion abnormalities noted.  3.  No EKG changes.  4.  Chest pain was not experienced during this study.  5.  Additional information: Attempted to contact patient (06/21/19).        ____________________________________     MD PAM AHN / KEITH     DD:  06/21/2019 12:39:55  DT:  06/21/2019 14:12:25     D#:  [id number]  Job#:  [id number]    No Reading Provider Prelim 8/19/2019    Tucker Francisco M.D.  [phone] 8/20/2019      Narrative & Impression  Transthoracic  Echo Report        Echocardiography Laboratory     CONCLUSIONS  Normal right and left ventricular size and function.   Left ventricular ejection fraction is visually estimated to be   65%.Septal \"bounce\" noted which can sometimes be seen with constrictive   physiology.  Excessive respiratory change of the septum, possibly ventilation-  related.  Moderate tricuspid regurgitation.  Right atrial pressure is estimated to be 3 mmHg.  Estimated right ventricular systolic pressure  is 30 mmHg.     No prior study " is available for comparison.        We reviewed in person the most recent labs  From [facility] his lowest GFR was 24 A1c was 6.8 last year      Assessment & Plan     1. Ischemic heart disease due to coronary artery obstruction (HCC)        2. Type 2 diabetes mellitus with other circulatory complication, without long-term current use of insulin (HCC)        3. Atherosclerosis of aorta (HCC)        4. CKD stage 4 due to type 2 diabetes mellitus (HCC)        5. Dyslipidemia        6. Coronary artery disease due to lipid rich plaque        7. S/P CABG (coronary artery bypass graft)            Medical Decision Making: Today's Assessment/Status/Plan:        It was my pleasure to meet with Mr. Marquez.    We addressed the management of hypertension at today's visit. Blood pressure is well controlled.  We specifically assessed the labs on hypertension treatment    We addressed the management of dyslipidemia at today's visit. He is on appropriate statin.    We addressed the management of coronary artery disease.  He is on proper antiplatelet, cholesterol management and beta-blockers as appropriate.  We addressed the potential side effects and laboratory follow-up for these medications.    He's done well with small area of ischemia on multiple prior SPECT and more recently in 2019 PET    I will see Mr. Marquez back in 1 year time if he is in the area and encouraged him to follow up with us over the phone or electronically using my MyChart as issues arise.    It is my pleasure to participate in the care of Mr. Marquez.  Please do not hesitate to contact me with questions or concerns.    Ulices Mcintyre MD PhD [facility]  Cardiologist [facility]    Please note that this dictation was created using voice recognition software. There may be errors I did not discover before finalizing the note.

## 2022-09-29 NOTE — PROCEDURE: COUNSELING
Detail Level: Zone
Detail Level: Detailed
Quality 224: Stage 0-Iic Melanoma: Overutilization Of Imaging Studies For Only Stage 0-Iic Melanoma: None of the following diagnostic imaging studies ordered: chest X-ray, CT, Ultrasound, MRI, PET, or nuclear medicine scans (ML)
Detail Level: Generalized
Quality 137: Melanoma: Continuity Of Care - Recall System: Patient information entered into a recall system that includes: target date for the next exam specified AND a process to follow up with patients regarding missed or unscheduled appointments
Statement Selected

## 2022-10-04 NOTE — PROGRESS NOTES
[facility] Follow-up Visit    Date of Visit: 10/18/2022     CC: Follow-up for FLEX management      HPI:  Juanito Marquez is a very pleasant 86 y.o. year old male never smoker, with a PMHx of FLEX, CKD, CAD s/p CABG, DSL, HTN, hypothyroid, and T2DM who presented to the [facility] for a regular follow up. Last seen in the office on 6/28/2022 with myself.     He presents for compliance.  He states he is still planning to move to [place] full-time as soon as his house here in [facility].  He is currently looking for sleep medicine physician in [place].  He denies any significant morning headaches, daytime/driving drowsy, issues while asleep, snoring, gasping, apneas, palpitations, dry mouth, aerophagia, mask leak, skin irritation, or any symptoms of RLS, narcolepsy or any nightmares, sleep walking or acting out of dreams.  He is currently going to bed at 9 PM and waking up at 6:30 AM.  He wakes up 1-2 times a night to use the bathroom but does not have any issues falling back asleep.  He will occasionally take a nap for a few minutes in the afternoons.    DME provider:  Online company, as his DME company has been out of business for the last 5 years.  Device:  Love DreamStation auto CPAP  Settings: AutoCPAP 7-15   When: 2016  Mask:  Nasal pillows  Chin strap: No     Cleaning regimen: He washes his supplies with vinegar.    Compliance:  Compliance data reviewed showing 80% usage > 4hours in last 30 days. Average AHI 7.2 events/hour. Mean pressure  8.3 with an average peak pressure 10.3 CWP.  Average large leak 35 seconds.  Patient continues to use and benefit from machine.      Sleep History:  Prior PSG not available but diagnosed >10yrs ago and treated with CPAP 7cm. Prior CNOX 2018 on pap noted basal spo2 91% and <88% for 25min of night.     Patient Active Problem List    Diagnosis Date Noted    Type 2 diabetes mellitus with circulatory disorder, without long-term current use of insulin (HCC)  09/28/2022    Ischemic heart disease due to coronary artery obstruction (HCC) 09/28/2022    Atherosclerosis of aorta ([facility]) 09/28/2022    FLEX on CPAP 06/28/2022    CKD stage 4 due to type 2 diabetes mellitus ([facility])     Type 2 diabetes mellitus, controlled ([facility]) 10/14/2015    Abnormal thyroid exam 06/30/2014    Keratosis, actinic 06/03/2014    S/P CABG (coronary artery bypass graft) 06/27/2013    Hypothyroidism 07/19/2012    Dyslipidemia     Essential hypertension, benign     Coronary artery disease due to lipid rich plaque     Back pain 06/23/2009     Past Medical History:   Diagnosis Date    Back pain 6/23/2009    Breath shortness     with altitude over 7300 feet    CAD (coronary artery disease)     CABG 1994. Cardiogy with [facility].    Cataract     Bilateral phaco with IOL    Chronic diarrhea 7/21/2008    CKD (chronic kidney disease) stage 3, GFR 30-59 ml/min ([facility])     CKD (chronic kidney disease) stage 4, GFR 15-29 ml/min ([facility])     Colon polyp 2007    Disorder of thyroid     DM (diabetes mellitus) ([facility])     6/24/19-Avg AM glucose=130-180, oral meds     Hemorrhagic disorder ([facility])     bleeds easily due to ASA    High cholesterol     Hyperlipidemia     Hypertension     Keratosis, actinic 6/3/2014    lumbar laminectomy 2010    Overweight(278.02) 7/21/2008    S/P right colectomy 2004    benign tubulovillous adenoma    Sleep apnea     CPAP    Snoring     status post CABG 1994    Status post cholecystectomy 2004      Past Surgical History:   Procedure Laterality Date    INGUINAL HERNIA REPAIR Right 8/15/2019    Procedure: REPAIR, HERNIA, INGUINAL;  Surgeon: Ciro Ferguson M.D.;  Location: SURGERY [facility];  Service: General    BASAL CELL EXCISION Right 7/2/2019    Procedure: EXCISION, CARCINOMA, BASAL CELL- FOR SQUAMOUS CELL DISTAL PRETIBIAL REGION;  Surgeon: Larry Tobin M.D.;  Location: SURGERY [facility];  Service: Plastics    SPLIT THICKNESS SKIN GRAFT Right 7/2/2019    Procedure: APPLICATION, GRAFT,  SKIN, SPLIT-THICKNESS;  Surgeon: Larry Tobin M.D.;  Location: SURGERY [facility];  Service: Plastics    CATARACT PHACO WITH IOL Bilateral 2016    COLONOSCOPY  2014    FUSION, SPINE, LUMBAR, PLIF  2008    L2-L3, L4-L5    MULTIPLE CORONARY ARTERY BYPASS  1994    6 vessel    CERVICAL DISK AND FUSION ANTERIOR  1987    VASECTOMY  1980    Reversal    TONSILLECTOMY  1955    COLECTOMY  early 2004    right FOR BENIGN ADENOMA with cholecystectomy     Family History   Problem Relation Age of Onset    Cancer Mother         breast    Heart Disease Mother     Diabetes Father     Cancer Father         bladder    Other Father         ruptured appendix    Diabetes Paternal Grandfather      Social History     Socioeconomic History    Marital status:      Spouse name: Not on file    Number of children: Not on file    Years of education: Not on file    Highest education level: Not on file   Occupational History    Not on file   Tobacco Use    Smoking status: Never    Smokeless tobacco: Never   Vaping Use    Vaping Use: Never used   Substance and Sexual Activity    Alcohol use: Yes     Alcohol/week: 2.4 oz     Types: 4 Glasses of wine per week     Comment: 1 per day    Drug use: No    Sexual activity: Not Currently   Other Topics Concern    Not on file   Social History Narrative    Not on file     Social Determinants of Health     Financial Resource Strain: Not on file   Food Insecurity: Not on file   Transportation Needs: Not on file   Physical Activity: Not on file   Stress: Not on file   Social Connections: Not on file   Intimate Partner Violence: Not on file   Housing Stability: Not on file     Current Outpatient Medications   Medication Sig Dispense Refill    losartan (COZAAR) 25 MG Tab Take 1 Tablet by mouth every day. 90 Tablet 3    rosuvastatin (CRESTOR) 10 MG Tab Take 1 Tablet by mouth every evening. 90 Tablet 3    glyBURIDE (DIABETA) 1.25 MG tablet Take 1.25 mg by mouth.      glimepiride (AMARYL) 1 MG tablet  Take 1 mg by mouth 2 times a day.      triamcinolone (NASACORT) 55 MCG/ACT nasal inhaler Spray 2 Sprays in nose every day.      Coenzyme Q10 (CO Q10) 100 MG Cap Take 2 Caps by mouth 2 Times a Day.      Empagliflozin 10 MG Tab Take 1 tablet by mouth every morning with breakfast. 90 Tab 3    Exenatide ER (BYDUREON) 2 MG Pen-injector Inject 2 mg as instructed every 7 days. 12 Each 3    levothyroxine (SYNTHROID) 100 MCG Tab Take 1 Tab by mouth Every morning on an empty stomach. 90 Tab 3    Ascorbic Acid (VITAMIN C) 1000 MG Tab Take 1,000 mg by mouth every day.      Omega-3 Fatty Acids 1200 MG Cap Take 2 Caps by mouth every day.      Cholecalciferol (VITAMIN D) 2000 UNIT TABS Take 2 Tabs by mouth every day.      aspirin 81 MG tablet Take 81 mg by mouth every day.      niacinamide 500 MG tablet Take 500 mg by mouth 2 Times a Day.       No current facility-administered medications for this visit.      ALLERGIES: Patient has no known allergies.    ROS:  Constitutional: Denies fever, chills, sweats,  weight loss, fatigue  Cardiovascular: Denies chest pain, tightness, palpitations, swelling in legs/feet  Respiratory: Denies shortness of breath, cough, sputum, wheezing, painful breathing   Sleep: per HPI  Gastrointestinal: Denies  difficulty swallowing, nausea, abdominal pain, diarrhea, constipation, heartburn.  Musculoskeletal: Denies painful joints, sore muscles,       PHYSICAL EXAM:  /72 (BP Location: Right arm, Patient Position: Sitting, BP Cuff Size: Adult)   Pulse 78   Resp 16   Ht 1.829 m (6')   Wt 69.9 kg (154 lb)   SpO2 97% Comment: room air at rest  BMI 20.89 kg/m²   Appearance: Well-nourished, well-developed, no acute distress  Eyes:  No scleral icterus , EOMI  ENMT: No redness of the oropharynx  Musculoskeletal:  Grossly normal; gait and station normal; digits and nails normal  Skin:  No rashes, petechiae, cyanosis  Neurologic: without focal signs; oriented to person, time, place, and purpose; judgement  intact  Psychiatric:  No depression, anxiety, agitation    Assessment and Plan:    The medical record was reviewed.    Diagnostic and titration nocturnal polysomnogram's, home sleep apnea tests, continuous nocturnal oximetry results, multiple sleep latency tests, and compliance reports reviewed.    Problem List Items Addressed This Visit       FLEX on CPAP     Sleep Apnea:    The pathophysiology of sleep anea and the increased risk of cardiovascular morbidity from untreated sleep apnea is discussed in detail with the patient.  She is urged to avoid supine sleep, weight gain and alcoholic beverages since all of these can worsen sleep apnea. She is cautioned against drowsy driving. If She feels sleepy while driving, She must pull over for a break/nap, rather than persist on the road, in the interest of She own safety and that of others on the road.      Compliance download was reviewed and discussed with the patient.  Will consider increasing the minimum pressure to 9-13 CWP when new machine arrives.       - Compliance was reinforced  - Advised patient to reach out via MyChart if any questions or concerns should arise.   - Equipment replacement schedule:  Mask cushion every month  Nasal pillows 2 times per month  Mask every 6 months  Head gear every 6 months  Tubing every 3 months  Ultra-fine filters 2 times per month  Foam filter every 6 months  Humidifier chamber every 6 months  Chin strap every 6 months     Has been advised to continue the current  auto CPAP 7-15 CWP, clean equipment frequently, and get new mask and supplies as allowed by insurance and DME. Recommend an earlier appointment, if significant treatment barriers develop.     The risks of untreated sleep apnea were discussed with the patient at length. Patients with FLEX are at increased risk of cardiovascular disease including coronary artery disease, systemic arterial hypertension, pulmonary arterial hypertension, cardiac arrythmias, and stroke. The  patient was advised to avoid driving a motor vehicle when drowsy.     Positive airway pressure will favorably impact many of the adverse conditions and effects provoked by FLEX.         Relevant Orders    DME Mask and Supplies       Have advised the patient to follow up with the appropriate healthcare practitioners for all other medical problems and issues.    Return in about 1 year (around 10/18/2023), or if symptoms worsen or fail to improve.      Please note portions of this record was created using voice recognition software. I have made every reasonable attempt to correct obvious errors, but I expect that there are errors of grammar and possibly content I did not discover before finalizing the note.    Time spent in record review prior to patient arrival, reviewing results, and in face-to-face encounter totaled 26 min.  __________  MARISSA Suresh  Pulmonary & Sleep Medicine  [facility]

## 2022-10-12 ENCOUNTER — APPOINTMENT (RX ONLY)
Dept: URBAN - METROPOLITAN AREA CLINIC 38 | Facility: CLINIC | Age: 87
Setting detail: DERMATOLOGY
End: 2022-10-12

## 2022-10-12 DIAGNOSIS — L57.8 OTHER SKIN CHANGES DUE TO CHRONIC EXPOSURE TO NONIONIZING RADIATION: ICD-10-CM

## 2022-10-12 PROBLEM — C44.629 SQUAMOUS CELL CARCINOMA OF SKIN OF LEFT UPPER LIMB, INCLUDING SHOULDER: Status: ACTIVE | Noted: 2022-10-12

## 2022-10-12 PROBLEM — D48.5 NEOPLASM OF UNCERTAIN BEHAVIOR OF SKIN: Status: ACTIVE | Noted: 2022-10-12

## 2022-10-12 PROBLEM — C44.722 SQUAMOUS CELL CARCINOMA OF SKIN OF RIGHT LOWER LIMB, INCLUDING HIP: Status: ACTIVE | Noted: 2022-10-12

## 2022-10-12 PROCEDURE — 17264 DSTRJ MAL LES T/A/L 3.1-4.0: CPT

## 2022-10-12 PROCEDURE — ? BIOPSY BY SHAVE METHOD

## 2022-10-12 PROCEDURE — ? PRESCRIPTION

## 2022-10-12 PROCEDURE — 99213 OFFICE O/P EST LOW 20 MIN: CPT | Mod: 25

## 2022-10-12 PROCEDURE — 17262 DSTRJ MAL LES T/A/L 1.1-2.0: CPT

## 2022-10-12 PROCEDURE — 11102 TANGNTL BX SKIN SINGLE LES: CPT | Mod: 59

## 2022-10-12 PROCEDURE — ? COUNSELING

## 2022-10-12 PROCEDURE — ? CURETTAGE AND DESTRUCTION

## 2022-10-12 RX ORDER — FLUOROURACIL 2 G/40G
CREAM TOPICAL QD-BID
Qty: 40 | Refills: 2 | Status: ERX | COMMUNITY
Start: 2022-10-12

## 2022-10-12 RX ADMIN — FLUOROURACIL: 2 CREAM TOPICAL at 00:00

## 2022-10-12 ASSESSMENT — LOCATION DETAILED DESCRIPTION DERM: LOCATION DETAILED: LEFT CENTRAL MALAR CHEEK

## 2022-10-12 ASSESSMENT — LOCATION ZONE DERM: LOCATION ZONE: FACE

## 2022-10-12 ASSESSMENT — LOCATION SIMPLE DESCRIPTION DERM: LOCATION SIMPLE: LEFT CHEEK

## 2022-10-12 NOTE — PROCEDURE: CURETTAGE AND DESTRUCTION
Detail Level: Detailed
Number Of Curettages: 2
Size Of Lesion In Cm: 1.2
Size Of Lesion After Curettage: 1.8
Add Intralesional Injection: No
Total Volume (Ccs): 1
Anesthesia Type: 1% lidocaine with epinephrine
Cautery Type: electrodesiccation
What Was Performed First?: Curettage
Final Size Statement: The size of the lesion after curettage was
Additional Information: (Optional): The wound was cleaned, and a pressure dressing was applied.  The patient received detailed post-op instructions. Patient mildly intoxicated. Assisted patient to his wife after procedure.
Consent was obtained from the patient. The risks, benefits and alternatives to therapy were discussed in detail. Specifically, the risks of infection, scarring, bleeding, prolonged wound healing, nerve injury, incomplete removal, allergy to anesthesia and recurrence were addressed. Alternatives to ED&C, such as: surgical removal and XRT were also discussed.  Prior to the procedure, the treatment site was clearly identified and confirmed by the patient. All components of Universal Protocol/PAUSE Rule completed.
Post-Care Instructions: I reviewed with the patient in detail post-care instructions. Patient is to keep the area dry for 48 hours, and not to engage in any swimming until the area is healed. Should the patient develop any fevers, chills, bleeding, severe pain patient will contact the office immediately.
Bill As A Line Item Or As Units: Line Item
Size Of Lesion In Cm: 3.1
Size Of Lesion After Curettage: 3.7

## 2022-10-18 ENCOUNTER — OFFICE VISIT (OUTPATIENT)
Dept: SLEEP MEDICINE | Facility: MEDICAL CENTER | Age: 87
End: 2022-10-18
Payer: MEDICARE

## 2022-10-18 VITALS
RESPIRATION RATE: 16 BRPM | SYSTOLIC BLOOD PRESSURE: 118 MMHG | WEIGHT: 154 LBS | OXYGEN SATURATION: 97 % | BODY MASS INDEX: 20.86 KG/M2 | HEIGHT: 72 IN | DIASTOLIC BLOOD PRESSURE: 72 MMHG | HEART RATE: 78 BPM

## 2022-10-18 DIAGNOSIS — G47.33 OSA ON CPAP: ICD-10-CM

## 2022-10-18 PROBLEM — E11.59 TYPE 2 DIABETES MELLITUS WITH CIRCULATORY DISORDER, WITHOUT LONG-TERM CURRENT USE OF INSULIN (HCC): Status: ACTIVE | Noted: 2022-09-28

## 2022-10-18 PROCEDURE — 99213 OFFICE O/P EST LOW 20 MIN: CPT

## 2022-10-18 NOTE — ASSESSMENT & PLAN NOTE
Sleep Apnea:    The pathophysiology of sleep anea and the increased risk of cardiovascular morbidity from untreated sleep apnea is discussed in detail with the patient.  She is urged to avoid supine sleep, weight gain and alcoholic beverages since all of these can worsen sleep apnea. She is cautioned against drowsy driving. If She feels sleepy while driving, She must pull over for a break/nap, rather than persist on the road, in the interest of She own safety and that of others on the road.      Compliance download was reviewed and discussed with the patient.  Will consider increasing the minimum pressure to 9-13 CWP when new machine arrives.       - Compliance was reinforced  - Advised patient to reach out via [employer] if any questions or concerns should arise.   - Equipment replacement schedule:  Mask cushion every month  Nasal pillows 2 times per month  Mask every 6 months  Head gear every 6 months  Tubing every 3 months  Ultra-fine filters 2 times per month  Foam filter every 6 months  Humidifier chamber every 6 months  Chin strap every 6 months     Has been advised to continue the current  auto CPAP 7-15 CWP, clean equipment frequently, and get new mask and supplies as allowed by insurance and DME. Recommend an earlier appointment, if significant treatment barriers develop.     The risks of untreated sleep apnea were discussed with the patient at length. Patients with FLEX are at increased risk of cardiovascular disease including coronary artery disease, systemic arterial hypertension, pulmonary arterial hypertension, cardiac arrythmias, and stroke. The patient was advised to avoid driving a motor vehicle when drowsy.     Positive airway pressure will favorably impact many of the adverse conditions and effects provoked by FLEX.

## 2022-11-07 ENCOUNTER — RX ONLY (RX ONLY)
Age: 87
End: 2022-11-07

## 2022-11-07 ENCOUNTER — APPOINTMENT (OUTPATIENT)
Dept: URBAN - METROPOLITAN AREA CLINIC 227 | Age: 87
Setting detail: DERMATOLOGY
End: 2022-11-08

## 2022-11-07 PROBLEM — C44.722 SQUAMOUS CELL CARCINOMA OF SKIN OF RIGHT LOWER LIMB, INCLUDING HIP: Status: ACTIVE | Noted: 2022-11-07

## 2022-11-07 PROCEDURE — OTHER CONSULTATION FOR MOHS SURGERY: OTHER

## 2022-11-07 PROCEDURE — 13121 CMPLX RPR S/A/L 2.6-7.5 CM: CPT

## 2022-11-07 PROCEDURE — OTHER EXCISION: OTHER

## 2022-11-07 PROCEDURE — 11602 EXC TR-EXT MAL+MARG 1.1-2 CM: CPT

## 2022-11-07 RX ORDER — CEPHALEXIN 500 MG/1
CAPSULE ORAL
Qty: 21 | Refills: 0 | Status: ERX | COMMUNITY
Start: 2022-11-07

## 2022-11-07 NOTE — PROCEDURE: CONSULTATION FOR MOHS SURGERY
Detail Level: Detailed
X Size Of Lesion In Cm (Optional): 0
Incorporate Mauc In Note: No
Other Plan: Pt declined Mohs and preferred excision

## 2022-11-07 NOTE — HPI: PROCEDURE (SKIN SURGERY)
Has The Growth Been Previously Biopsied?: has been previously biopsied
Additional History: OUTSIDE PATHOLOGY-\\nPREVIOUS ACCESSION #[id number]\\Carlos ANTONIO DO

## 2022-11-07 NOTE — PROCEDURE: EXCISION
Rifampin Counseling: I discussed with the patient the risks of rifampin including but not limited to liver damage, kidney damage, red-orange body fluids, nausea/vomiting and severe allergy. Saucerization Depth: dermis and superficial adipose tissue

## 2022-11-21 ENCOUNTER — APPOINTMENT (OUTPATIENT)
Dept: URBAN - METROPOLITAN AREA CLINIC 227 | Age: 87
Setting detail: DERMATOLOGY
End: 2022-11-21

## 2022-11-21 DIAGNOSIS — Z48.02 ENCOUNTER FOR REMOVAL OF SUTURES: ICD-10-CM

## 2022-11-21 PROCEDURE — OTHER SUTURE REMOVAL (GLOBAL PERIOD): OTHER

## 2022-11-21 PROCEDURE — 99024 POSTOP FOLLOW-UP VISIT: CPT

## 2022-11-21 ASSESSMENT — LOCATION ZONE DERM: LOCATION ZONE: LEG

## 2022-11-21 ASSESSMENT — LOCATION DETAILED DESCRIPTION DERM: LOCATION DETAILED: RIGHT MEDIAL PROXIMAL PRETIBIAL REGION

## 2022-11-21 ASSESSMENT — LOCATION SIMPLE DESCRIPTION DERM: LOCATION SIMPLE: RIGHT PRETIBIAL REGION

## 2022-11-21 NOTE — PROCEDURE: SUTURE REMOVAL (GLOBAL PERIOD)
Add 36417 Cpt? (Important Note: In 2017 The Use Of 00173 Is Being Tracked By Cms To Determine Future Global Period Reimbursement For Global Periods): yes
Detail Level: Simple

## 2022-12-14 NOTE — PROCEDURE: CURETTAGE AND DESTRUCTION
Render Post-Care Instructions In Note?: yes
Yes
Hide Accession Number?: No
Concentration (Mg/Ml Or Millions Of Plaque Forming Units/Cc): 0.01
Consent: Written Consent was obtained from the patient. The risks, benefits and alternatives to therapy were discussed in detail. Specifically, the risks of infection, scarring, bleeding, prolonged wound healing, nerve injury, incomplete removal, allergy to anesthesia and recurrence were addressed. Alternatives to ED&C, such as: surgical removal and XRT were also discussed.  Prior to the procedure, the treatment site was clearly identified and confirmed by the patient. All components of Universal Protocol/PAUSE Rule completed.
Cautery Type: electrodesiccation
Size Of Lesion After Curettage: 1.7
Number Of Curettages: 3
Total Volume (Ccs): 1
Additional Information: (Optional): The wound was cleaned, and a pressure dressing was applied.  The patient received detailed post-op instructions.
Bill As A Line Item Or As Units: Line Item
What Was Performed First?: Curettage
Post-Care Instructions: I reviewed with the patient in detail post-care instructions. Patient is to keep the area dry for 48 hours, and not to engage in any swimming until the area is healed. Should the patient develop any fevers, chills, bleeding, severe pain patient will contact the office immediately.
Size Of Lesion In Cm: 1.4
Anesthesia Type: 0.5% lidocaine with 1:200,000 epinephrine and a 1:10 solution of 8.4% sodium bicarbonate
Detail Level: Detailed
Final Size Statement: The size of the lesion after curettage was

## 2023-01-12 ENCOUNTER — TELEPHONE (OUTPATIENT)
Dept: HEALTH INFORMATION MANAGEMENT | Facility: OTHER | Age: 88
End: 2023-01-12

## 2023-05-25 NOTE — PROGRESS NOTES
[facility] Follow-up Visit    Date of Visit: 6/12/2023     CC: Follow-up for FLEX management      HPI:  Juanito Marquez is a very pleasant 86 y.o. year old male never smoker, with a PMHx of FLEX, CKD, CAD s/p CABG, DSL, HTN, hypothyroid, and T2DM who presented to the [facility] for a regular follow up. Last seen in the office on 10/18/2022 with myself.     Patient presents for compliance.  ***        Denies morning headaches.    Denies daytime drowsiness / driving drowsy.     Denies any issues falling asleep.      Snoring / Gasping / Apneas    Palpitations    Dry mouth    Denies any symptoms of RLS, narcolepsy or any nightmares, sleep walking or acting out of dreams.    Aerophagia    Mask leak / Skin irritation      Goes to bed:  Wakes up:  Naps (frequency and duration):  Awakenings:  Issues falling back to sleep?      DME provider:  Online company, as his DME company has been out of business for the last 5 years.  Device:  Prolify auto CPAP  Settings: AutoCPAP 7-15   When: 2016  Mask:  Nasal pillows  Chin strap: No     Cleaning regimen: He washes his supplies with vinegar.    Compliance:  Compliance data reviewed showing ***% usage > 4hours in last 30 *** days. Average AHI *** events/hour. 95% pressure *** CWP. 95% leaks *** L/min. Patient continues to use and benefit from machine. ***       Sleep History:  Prior PSG not available but diagnosed >10yrs ago and treated with CPAP 7cm. Prior CNOX 2018 on pap noted basal spo2 91% and <88% for 25min of night.     Patient Active Problem List    Diagnosis Date Noted    Type 2 diabetes mellitus with circulatory disorder, without long-term current use of insulin ([facility]) 09/28/2022    Ischemic heart disease due to coronary artery obstruction ([facility]) 09/28/2022    Atherosclerosis of aorta ([facility]) 09/28/2022    FLEX on CPAP 06/28/2022    CKD stage 4 due to type 2 diabetes mellitus ([facility])     Type 2 diabetes mellitus, controlled ([facility]) 10/14/2015    Abnormal  thyroid exam 06/30/2014    Keratosis, actinic 06/03/2014    S/P CABG (coronary artery bypass graft) 06/27/2013    Hypothyroidism 07/19/2012    Dyslipidemia     Essential hypertension, benign     Coronary artery disease due to lipid rich plaque     Back pain 06/23/2009     Past Medical History:   Diagnosis Date    Back pain 6/23/2009    Breath shortness     with altitude over 7300 feet    CAD (coronary artery disease)     CABG 1994. Cardiogy with [facility].    Cataract     Bilateral phaco with IOL    Chronic diarrhea 7/21/2008    CKD (chronic kidney disease) stage 3, GFR 30-59 ml/min (HCC)     CKD (chronic kidney disease) stage 4, GFR 15-29 ml/min (HCC)     Colon polyp 2007    Disorder of thyroid     DM (diabetes mellitus) (HCC)     6/24/19-Avg AM glucose=130-180, oral meds     Hemorrhagic disorder ([facility])     bleeds easily due to ASA    High cholesterol     Hyperlipidemia     Hypertension     Keratosis, actinic 6/3/2014    lumbar laminectomy 2010    Overweight(278.02) 7/21/2008    S/P right colectomy 2004    benign tubulovillous adenoma    Sleep apnea     CPAP    Snoring     status post CABG 1994    Status post cholecystectomy 2004      Past Surgical History:   Procedure Laterality Date    INGUINAL HERNIA REPAIR Right 8/15/2019    Procedure: REPAIR, HERNIA, INGUINAL;  Surgeon: Ciro Ferguson M.D.;  Location: [facility];  Service: General    BASAL CELL EXCISION Right 7/2/2019    Procedure: EXCISION, CARCINOMA, BASAL CELL- FOR SQUAMOUS CELL DISTAL PRETIBIAL REGION;  Surgeon: Larry Tobin M.D.;  Location: [facility];  Service: Plastics    SPLIT THICKNESS SKIN GRAFT Right 7/2/2019    Procedure: APPLICATION, GRAFT, SKIN, SPLIT-THICKNESS;  Surgeon: Larry Tobin M.D.;  Location: [facility];  Service: Plastics    CATARACT PHACO WITH IOL Bilateral 2016    COLONOSCOPY  2014    FUSION, SPINE, LUMBAR, PLIF  2008    L2-L3, L4-L5    MULTIPLE CORONARY ARTERY BYPASS  1994    6 vessel     CERVICAL DISK AND FUSION ANTERIOR  1987    VASECTOMY  1980    Reversal    TONSILLECTOMY  1955    COLECTOMY  early 2004    right FOR BENIGN ADENOMA with cholecystectomy     Family History   Problem Relation Age of Onset    Cancer Mother         breast    Heart Disease Mother     Diabetes Father     Cancer Father         bladder    Other Father         ruptured appendix    Diabetes Paternal Grandfather      Social History     Socioeconomic History    Marital status:      Spouse name: Not on file    Number of children: Not on file    Years of education: Not on file    Highest education level: Not on file   Occupational History    Not on file   Tobacco Use    Smoking status: Never    Smokeless tobacco: Never   Vaping Use    Vaping Use: Never used   Substance and Sexual Activity    Alcohol use: Yes     Alcohol/week: 2.4 oz     Types: 4 Glasses of wine per week     Comment: 1 per day    Drug use: No    Sexual activity: Not Currently   Other Topics Concern    Not on file   Social History Narrative    Not on file     Social Determinants of Health     Financial Resource Strain: Not on file   Food Insecurity: Not on file   Transportation Needs: Not on file   Physical Activity: Not on file   Stress: Not on file   Social Connections: Not on file   Intimate Partner Violence: Not on file   Housing Stability: Not on file     Current Outpatient Medications   Medication Sig Dispense Refill    losartan (COZAAR) 25 MG Tab Take 1 Tablet by mouth every day. 90 Tablet 3    rosuvastatin (CRESTOR) 10 MG Tab Take 1 Tablet by mouth every evening. 90 Tablet 3    glyBURIDE (DIABETA) 1.25 MG tablet Take 1.25 mg by mouth.      glimepiride (AMARYL) 1 MG tablet Take 1 mg by mouth 2 times a day.      triamcinolone (NASACORT) 55 MCG/ACT nasal inhaler Spray 2 Sprays in nose every day.      Coenzyme Q10 (CO Q10) 100 MG Cap Take 2 Caps by mouth 2 Times a Day.      Empagliflozin 10 MG Tab Take 1 tablet by mouth every morning with breakfast. 90  Tab 3    Exenatide ER (BYDUREON) 2 MG Pen-injector Inject 2 mg as instructed every 7 days. 12 Each 3    levothyroxine (SYNTHROID) 100 MCG Tab Take 1 Tab by mouth Every morning on an empty stomach. 90 Tab 3    niacinamide 500 MG tablet Take 500 mg by mouth 2 Times a Day.      Ascorbic Acid (VITAMIN C) 1000 MG Tab Take 1,000 mg by mouth every day.      Omega-3 Fatty Acids 1200 MG Cap Take 2 Caps by mouth every day.      Cholecalciferol (VITAMIN D) 2000 UNIT TABS Take 2 Tabs by mouth every day.      aspirin 81 MG tablet Take 81 mg by mouth every day.       No current facility-administered medications for this visit.      ALLERGIES: Patient has no known allergies.    ROS:  Constitutional: Denies fever, chills, sweats,  weight loss, fatigue  Cardiovascular: Denies chest pain, tightness, palpitations, swelling in legs/feet  Respiratory: Denies shortness of breath, cough, sputum, wheezing, painful breathing   Sleep: per HPI  Gastrointestinal: Denies  difficulty swallowing, nausea, abdominal pain, diarrhea, constipation, heartburn.  Musculoskeletal: Denies painful joints, sore muscles,       PHYSICAL EXAM:  There were no vitals taken for this visit.  Appearance: Well-nourished, well-developed, no acute distress  Eyes:  No scleral icterus , EOMI  ENMT: No redness of the oropharynx  Musculoskeletal:  Grossly normal; gait and station normal; digits and nails normal  Skin:  No rashes, petechiae, cyanosis  Neurologic: without focal signs; oriented to person, time, place, and purpose; judgement intact  Psychiatric:  No depression, anxiety, agitation    Assessment and Plan:    The medical record was reviewed.    Diagnostic and titration nocturnal polysomnogram's, home sleep apnea tests, continuous nocturnal oximetry results, multiple sleep latency tests, and compliance reports reviewed.    Problem List Items Addressed This Visit    None        Have advised the patient to follow up with the appropriate healthcare practitioners for  all other medical problems and issues.    No follow-ups on file.      Please note portions of this record was created using voice recognition software. I have made every reasonable attempt to correct obvious errors, but I expect that there are errors of grammar and possibly content I did not discover before finalizing the note.    Time spent in record review prior to patient arrival, reviewing results, and in face-to-face encounter totaled 26 min.  __________  MARISSA Suresh  Pulmonary & Sleep Medicine  Pending sale to [facility]

## 2023-06-12 ENCOUNTER — APPOINTMENT (OUTPATIENT)
Dept: SLEEP MEDICINE | Facility: MEDICAL CENTER | Age: 88
End: 2023-06-12
Payer: MEDICARE

## 2023-08-28 NOTE — PROCEDURE: MOHS SURGERY
Mohs Method Verbiage: An incision at a 45 degree angle following the standard Mohs approach was done and the specimen was harvested as a microscopic controlled layer. Length To Time In Minutes Device Was In Place: 10

## 2024-04-01 ENCOUNTER — TELEPHONE (OUTPATIENT)
Dept: CARDIOLOGY | Facility: MEDICAL CENTER | Age: 89
End: 2024-04-01
Payer: MEDICARE

## 2024-04-01 NOTE — TELEPHONE ENCOUNTER
[facility]  Caller: Juanito Marquez    Topic/issue: Patient has a cardiologist in [place] as he is spending more time down there than in [place]. Patient is requesting a C/B from [facility] when he is able.     Callback Number: [phone]    Thank you,  Pearl REID

## 2024-04-01 NOTE — TELEPHONE ENCOUNTER
Phone Number Called: [phone]    Call outcome: Spoke to patient regarding message below.    Message: Patient reports that he is permanently moving to [place] at this time and has a new cardiologist. Patient reports new cardiologist has all current records. All questions answered at this time. Advised to call back with any further questions or concerns.

## 2024-04-08 NOTE — PROCEDURE: REPAIR NOTE
Occupational Therapy  [facility]  Occupational Therapy Not Seen    DATE: 2024    NAME: Jorge Luis Banda  MRN: [id number]   : 1942    Patient not seen this date for Occupational Therapy due to:      [] Cancel by RN or physician due to:    [] Hemodialysis    [] Critical Lab Value Level     [] Blood transfusion in progress    [] Acute or unstable cardiovascular status   _MAP < 55 or more than >115  _HR < 40 or > 130    [] Acute or unstable pulmonary status   -FiO2 > 60%   _RR < 5 or >40    _O2 sats < 85%    [] Strict Bedrest    [x] Off Unit for surgery or procedure: EGD    [] Off Unit for testing       [] Pending imaging to R/O fracture    [] Refusal by Patient      [] Other      [] OT being discontinued at this time. Patient independent. No further needs.     [] OT being discontinued at this time as the patient has been transferred to hospice care. No further needs.      Gertrudis Wetzel ZINA     A-T Advancement Flap Text: The defect edges were debeveled with a #15 scalpel blade.  Given the location of the defect, shape of the defect and the proximity to free margins an A-T advancement flap was deemed most appropriate.  Using a sterile surgical marker, an appropriate advancement flap was drawn incorporating the defect and placing the expected incisions within the relaxed skin tension lines where possible.    The area thus outlined was incised deep to adipose tissue with a #15 scalpel blade.  The skin margins were undermined to an appropriate distance in all directions utilizing iris scissors.

## 2024-06-20 NOTE — OR SURGEON
Immediate Post OP Note    PreOp Diagnosis: Right Inguinal Hernia    PostOp Diagnosis: same (direct)    Procedure(s):  REPAIR, RIGHT HERNIA, INGUINAL - Wound Class: Clean    Surgeon(s):  Ciro Ferguson M.D.    Anesthesiologist/Type of Anesthesia:  Anesthesiologist: Mick Pierre M.D./General    Surgical Staff:  Assistant: Kaycee Schultz  Circulator: Candy Vera R.N.; Larissa Dooley R.N.  Scrub Person: Lukas D. Gansert, R.N.; Daniel Tran    Specimens removed if any:  * No specimens in log *    Estimated Blood Loss: minimal    Findings: large direct inguinal hernia with thick sac    Complications: no apparent complications        8/15/2019 3:37 PM Ciro Ferguson M.D.       Labs new

## 2024-08-19 NOTE — PROCEDURE: REPAIR NOTE
General Surgery Instructions:    Follow up: Please expect a phone call to schedule a follow-up appointment in 2-3 weeks. If you have any questions or concerns, please call us at [phone].    Clinic fax: [phone]    Diet: Regular diet. Avoid carbonation and acidic foods (I.e. caffeine, citrus, tomato). Patients can have difficulty with constipation following surgery, due in part to the administration of narcotic medications.  If you are suffering with constipation, you should avoid foods such as hard cheeses or red meat.  Foods high in fiber are recommended.      Activity: You should continue to be active at home, including ambulating frequently.  If possible try to limit the amount of time spent in bed.    Restrictions: You should not lift greater than 20 pounds for 2 weeks, and will want to avoid strenuous physical activity for 1-2 weeks.  You should limit your physical activity if it causes you discomfort; however, this should resolve within 1-2 weeks.   Walking does not count as strenuous physical activity.  You are safe to walk up and down stairs.  Following 2 weeks you may resume all normal physical activity.    Work:  You can return to work once your surgical pain has resolved.  If you perform duties that require lifting, pushing or pulling anything greater than 15 pounds, then you would have to be on light duty for the immediate 2 weeks after your surgery (if your work allows light duty).  After 2 weeks, you can return to work without restrictions.    Wound care: Your wounds are covered with Dermabond which is a waterproof skin glue. This will peel off on its own after 14 days.  It is normal to have a small rim of red present around the incisions. This should not, however, extend beyond 1/4 inch from the incision.  If your incisions become increasingly tender, red, or draining, please contact us.       Bathing: You may shower after 24 hours from surgery.  It is ok to get your incisions wet, but avoid  rubbing them.  Avoid soaking in bath tubs, or swimming in lakes, pools, or streams for 2 weeks following surgery.    Muscle Hinge Flap Text: The defect edges were debeveled with a #15 scalpel blade.  Given the size, depth and location of the defect and the proximity to free margins a muscle hinge flap was deemed most appropriate.  Using a sterile surgical marker, an appropriate hinge flap was drawn incorporating the defect. The area thus outlined was incised with a #15 scalpel blade.  The skin margins were undermined to an appropriate distance in all directions utilizing iris scissors.

## 2024-11-12 ENCOUNTER — OFFICE VISIT (OUTPATIENT)
Dept: URBAN - METROPOLITAN AREA CLINIC 13 | Facility: CLINIC | Age: 89
End: 2024-11-12
Payer: MEDICARE

## 2024-11-12 DIAGNOSIS — H43.813 VITREOUS DEGENERATION, BILATERAL: ICD-10-CM

## 2024-11-12 DIAGNOSIS — E11.3493 TYPE 2 DIABETES MELLITUS WITH SEVERE NONPROLIFERATIVE DIABETIC RETINOPATHY WITHOUT MACULAR EDEMA, BILATERAL: ICD-10-CM

## 2024-11-12 DIAGNOSIS — Z96.1 PRESENCE OF INTRAOCULAR LENS: ICD-10-CM

## 2024-11-12 DIAGNOSIS — H34.231 RETINAL ARTERY BRANCH OCCLUSION, RIGHT EYE: Primary | ICD-10-CM

## 2024-11-12 DIAGNOSIS — H35.371 PUCKERING OF MACULA, RIGHT EYE: ICD-10-CM

## 2024-11-12 PROCEDURE — 99214 OFFICE O/P EST MOD 30 MIN: CPT | Performed by: OPHTHALMOLOGY

## 2024-11-12 PROCEDURE — 92134 CPTRZ OPH DX IMG PST SGM RTA: CPT | Performed by: OPHTHALMOLOGY

## 2024-11-12 PROCEDURE — 92235 FLUORESCEIN ANGRPH MLTIFRAME: CPT | Performed by: OPHTHALMOLOGY

## 2024-11-12 ASSESSMENT — INTRAOCULAR PRESSURE
OD: 8
OS: 17

## 2024-11-15 NOTE — PROCEDURE: EXCISION
Patient counseled on fall risk assessment and prevention at home and in public - verbalized understanding     Complex Repair And Dorsal Nasal Flap Text: The defect edges were debeveled with a #15 scalpel blade.  The primary defect was closed partially with a complex linear closure.  Given the location of the remaining defect, shape of the defect and the proximity to free margins a dorsal nasal flap was deemed most appropriate for complete closure of the defect.  Using a sterile surgical marker, an appropriate flap was drawn incorporating the defect and placing the expected incisions within the relaxed skin tension lines where possible.    The area thus outlined was incised deep to adipose tissue with a #15 scalpel blade.  The skin margins were undermined to an appropriate distance in all directions utilizing iris scissors.

## 2025-01-09 NOTE — PROCEDURE: COUNSELING
When Should The Patient Follow-Up For Their Next Full-Body Skin Exam?: 3 Months
Quality 137: Melanoma: Continuity Of Care - Recall System: Patient information entered into a recall system that includes: target date for the next exam specified AND a process to follow up with patients regarding missed or unscheduled appointments
Detail Level: Detailed
Patient/Caregiver provided printed discharge information.

## 2025-02-20 ENCOUNTER — OFFICE VISIT (OUTPATIENT)
Dept: URBAN - METROPOLITAN AREA CLINIC 13 | Facility: CLINIC | Age: 89
End: 2025-02-20
Payer: MEDICARE

## 2025-02-20 DIAGNOSIS — H43.813 VITREOUS DEGENERATION, BILATERAL: ICD-10-CM

## 2025-02-20 DIAGNOSIS — H35.371 PUCKERING OF MACULA, RIGHT EYE: ICD-10-CM

## 2025-02-20 DIAGNOSIS — Z96.1 PRESENCE OF INTRAOCULAR LENS: ICD-10-CM

## 2025-02-20 DIAGNOSIS — H34.231 RETINAL ARTERY BRANCH OCCLUSION, RIGHT EYE: Primary | ICD-10-CM

## 2025-02-20 DIAGNOSIS — E11.3493 TYPE 2 DIAB WITH SEVERE NONP RTNOP WITHOUT MACULAR EDEMA, BI: ICD-10-CM

## 2025-02-20 PROCEDURE — 92235 FLUORESCEIN ANGRPH MLTIFRAME: CPT | Performed by: OPHTHALMOLOGY

## 2025-02-20 PROCEDURE — 92134 CPTRZ OPH DX IMG PST SGM RTA: CPT | Performed by: OPHTHALMOLOGY

## 2025-02-20 PROCEDURE — 99214 OFFICE O/P EST MOD 30 MIN: CPT | Performed by: OPHTHALMOLOGY

## 2025-02-20 ASSESSMENT — INTRAOCULAR PRESSURE
OS: 18
OD: 15

## (undated) DEVICE — KIT ROOM DECONTAMINATION

## (undated) DEVICE — Device

## (undated) DEVICE — BLADE DERMATOME NEW AIR (10/BX)

## (undated) DEVICE — SENSOR SPO2 NEO LNCS ADHESIVE (20/BX) SEE USER NOTES

## (undated) DEVICE — CANISTER SUCTION 3000ML MECHANICAL FILTER AUTO SHUTOFF MEDI-VAC NONSTERILE LF DISP  (40EA/CA)

## (undated) DEVICE — SLEEVE, VASO, THIGH, MED

## (undated) DEVICE — ELECTRODE 850 FOAM ADHESIVE - HYDROGEL RADIOTRNSPRNT (50/PK)

## (undated) DEVICE — GLOVE BIOGEL PI ORTHO SZ 7.5 PF LF (40PR/BX)

## (undated) DEVICE — SODIUM CHL IRRIGATION 0.9% 1000ML (12EA/CA)

## (undated) DEVICE — BANDAGE ELASTIC 4 IN X 5 YDS - LATEX FREE(10/BX 5BX/CA)

## (undated) DEVICE — GLOVE, BIOGEL ECLIPSE, SZ 7.0, PF LTX (50/BX)

## (undated) DEVICE — BAG, SPONGE COUNT 50600

## (undated) DEVICE — TUBING CLEARLINK DUO-VENT - C-FLO (48EA/CA)

## (undated) DEVICE — SET EXTENSION WITH 2 PORTS (48EA/CA) ***PART #2C8610 IS A SUBSTITUTE*****

## (undated) DEVICE — SUCTION INSTRUMENT YANKAUER BULBOUS TIP W/O VENT (50EA/CA)

## (undated) DEVICE — DRAPE LAPAROTOMY T SHEET - (12EA/CA)

## (undated) DEVICE — SUTURE 3-0 VICRYL PLUS SH - 8X 18 INCH (12/BX)

## (undated) DEVICE — DRESSING TRANSPARENT FILM TEGADERM 2.375 X 2.75"  (100EA/BX)"

## (undated) DEVICE — SUTURE 4-0 ETHILON FS-2 18 (36PK/BX)"

## (undated) DEVICE — DRESSING TRANSPARENT FILM TEGADERM 4 X 4.75" (50EA/BX)"

## (undated) DEVICE — SUTURE5/0 VICRYL P-1 - (12/BX) RAPIDE

## (undated) DEVICE — SUTURE GENERAL

## (undated) DEVICE — CLOSURE SKIN STRIP 1/2 X 4 IN - (STERI STRIP) (50/BX 4BX/CA)

## (undated) DEVICE — DRAPE SURGICAL U 77X120 - (10/CA)

## (undated) DEVICE — KIT  I.V. START (100EA/CA)

## (undated) DEVICE — SPONGE GAUZESTER. 2X2 4-PL - (2/PK 50PK/BX 30BX/CS)

## (undated) DEVICE — CHLORAPREP 26 ML APPLICATOR - ORANGE TINT(25/CA)

## (undated) DEVICE — CANISTER SUCTION RIGID RED 1500CC (40EA/CA)

## (undated) DEVICE — MASK ANESTHESIA ADULT  - (100/CA)

## (undated) DEVICE — NEPTUNE 4 PORT MANIFOLD - (20/PK)

## (undated) DEVICE — WATER IRRIGATION STERILE 1000ML (12EA/CA)

## (undated) DEVICE — SPONGE GAUZE STER 4X4 8-PL - (2/PK 50PK/BX 12BX/CS)

## (undated) DEVICE — BLADE SURGICAL #15 - (50/BX 3BX/CA)

## (undated) DEVICE — PACK MINOR BASIN - (2EA/CA)

## (undated) DEVICE — GLOVE BIOGEL SZ 6 PF LATEX - (50EA/BX 4BX/CA)

## (undated) DEVICE — HEAD HOLDER JUNIOR/ADULT

## (undated) DEVICE — DRAIN PENROSE STERILE 1/4 X - 18 IN  (25EA/BX)

## (undated) DEVICE — DERMACARRIER 8 (NEW MESHGFT) - (10/BX)

## (undated) DEVICE — STAPLER SKIN DISP - (6/BX 10BX/CA) VISISTAT

## (undated) DEVICE — SYRINGE SAFETY 10 ML 18 GA X 1 1/2 BLUNT LL (100/BX 4BX/CA)

## (undated) DEVICE — LACTATED RINGERS INJ 1000 ML - (14EA/CA 60CA/PF)

## (undated) DEVICE — CATHETER IV 20 GA X 1-1/4 ---SURG.& SDS ONLY--- (50EA/BX)

## (undated) DEVICE — KIT ANESTHESIA W/CIRCUIT & 3/LT BAG W/FILTER (20EA/CA)

## (undated) DEVICE — BOVIE NEEDLE TIP INSULATD NON-SAFETY 2CM (50/PK)

## (undated) DEVICE — GOWN WARMING STANDARD FLEX - (30/CA)

## (undated) DEVICE — DRESSING XEROFORM 1X8 - (50/BX 4BX/CA)

## (undated) DEVICE — TOWELS CLOTH SURGICAL - (4/PK 20PK/CA)

## (undated) DEVICE — ELECTRODE DUAL RETURN W/ CORD - (50/PK)

## (undated) DEVICE — STERI STRIP COMPOUND BENZOIN - TINCTURE 0.6ML WITH APPLICATOR (40EA/BX)

## (undated) DEVICE — PACK LOWER EXTREMITY - (2/CA)

## (undated) DEVICE — NEEDLE NON SAFETY 25 GA X 1 1/2 IN HYPO (100EA/BX)

## (undated) DEVICE — SUTURE 3-0 VICRYL PLUS - 12 X 18 INCH (12/BX)

## (undated) DEVICE — SUTURE 4-0 MONOCRYL PLUS PS-2 - 27 INCH (36/BX)

## (undated) DEVICE — TUBE CONNECTING SUCTION - CLEAR PLASTIC STERILE 72 IN (50EA/CA)

## (undated) DEVICE — GLOVES, #7 1/2 SENSICARE

## (undated) DEVICE — GLOVE BIOGEL SZ 7.5 SURGICAL PF LTX - (50PR/BX 4BX/CA)

## (undated) DEVICE — PROTECTOR ULNA NERVE - (36PR/CA)

## (undated) DEVICE — HUMID-VENT HEAT AND MOISTURE EXCHANGE- (50/BX)

## (undated) DEVICE — GLOVE, LITE (PAIR)

## (undated) DEVICE — GLOVE BIOGEL INDICATOR SZ 7.5 SURGICAL PF LTX - (50PR/BX 4BX/CA)